# Patient Record
Sex: FEMALE | Race: WHITE | NOT HISPANIC OR LATINO | Employment: UNEMPLOYED | URBAN - METROPOLITAN AREA
[De-identification: names, ages, dates, MRNs, and addresses within clinical notes are randomized per-mention and may not be internally consistent; named-entity substitution may affect disease eponyms.]

---

## 2020-06-22 ENCOUNTER — TELEPHONE (OUTPATIENT)
Dept: OBSTETRICS AND GYNECOLOGY | Facility: CLINIC | Age: 25
End: 2020-06-22

## 2020-06-22 NOTE — TELEPHONE ENCOUNTER
----- Message from Danyelle Cunningham sent at 6/22/2020  9:13 AM CDT -----  Contact: Self  Joseph Pepper  MRN: [id number]  Home Phone      [phone]  Work Phone      Not on file.  Mobile          [phone]    No care team member to display  OB? Yes, Unknown  What phone number can you be reached at? [phone]  Message:   Pt requesting new patient annual, dx preg

## 2020-07-28 ENCOUNTER — OFFICE VISIT (OUTPATIENT)
Dept: OBSTETRICS AND GYNECOLOGY | Facility: CLINIC | Age: 25
End: 2020-07-28
Payer: COMMERCIAL

## 2020-07-28 ENCOUNTER — LAB VISIT (OUTPATIENT)
Dept: LAB | Facility: HOSPITAL | Age: 25
End: 2020-07-28
Attending: OBSTETRICS & GYNECOLOGY
Payer: COMMERCIAL

## 2020-07-28 VITALS
RESPIRATION RATE: 16 BRPM | BODY MASS INDEX: 42.65 KG/M2 | DIASTOLIC BLOOD PRESSURE: 84 MMHG | SYSTOLIC BLOOD PRESSURE: 126 MMHG | HEART RATE: 120 BPM | HEIGHT: 66 IN | WEIGHT: 265.38 LBS

## 2020-07-28 DIAGNOSIS — O21.9 NAUSEA AND VOMITING DURING PREGNANCY: ICD-10-CM

## 2020-07-28 DIAGNOSIS — Z32.01 PREGNANCY EXAMINATION OR TEST, POSITIVE RESULT: Primary | ICD-10-CM

## 2020-07-28 DIAGNOSIS — Z32.01 PREGNANCY EXAMINATION OR TEST, POSITIVE RESULT: ICD-10-CM

## 2020-07-28 DIAGNOSIS — Z36.89 ENCOUNTER TO ESTABLISH GESTATIONAL AGE USING ULTRASOUND: ICD-10-CM

## 2020-07-28 DIAGNOSIS — Z12.4 CERVICAL CANCER SCREENING: ICD-10-CM

## 2020-07-28 DIAGNOSIS — Z36.9 ANTENATAL SCREENING ENCOUNTER: ICD-10-CM

## 2020-07-28 LAB
ABO + RH BLD: NORMAL
B-HCG UR QL: POSITIVE
BASOPHILS # BLD AUTO: 0.03 K/UL (ref 0–0.2)
BASOPHILS NFR BLD: 0.2 % (ref 0–1.9)
BLD GP AB SCN CELLS X3 SERPL QL: NORMAL
CTP QC/QA: YES
DIFFERENTIAL METHOD: ABNORMAL
EOSINOPHIL # BLD AUTO: 0.2 K/UL (ref 0–0.5)
EOSINOPHIL NFR BLD: 1.8 % (ref 0–8)
ERYTHROCYTE [DISTWIDTH] IN BLOOD BY AUTOMATED COUNT: 13.8 % (ref 11.5–14.5)
HCT VFR BLD AUTO: 40.5 % (ref 37–48.5)
HGB BLD-MCNC: 13.2 G/DL (ref 12–16)
IMM GRANULOCYTES # BLD AUTO: 0.02 K/UL (ref 0–0.04)
IMM GRANULOCYTES NFR BLD AUTO: 0.2 % (ref 0–0.5)
LYMPHOCYTES # BLD AUTO: 2.3 K/UL (ref 1–4.8)
LYMPHOCYTES NFR BLD: 18.9 % (ref 18–48)
MCH RBC QN AUTO: 27.3 PG (ref 27–31)
MCHC RBC AUTO-ENTMCNC: 32.6 G/DL (ref 32–36)
MCV RBC AUTO: 84 FL (ref 82–98)
MONOCYTES # BLD AUTO: 0.8 K/UL (ref 0.3–1)
MONOCYTES NFR BLD: 6.6 % (ref 4–15)
NEUTROPHILS # BLD AUTO: 8.7 K/UL (ref 1.8–7.7)
NEUTROPHILS NFR BLD: 72.3 % (ref 38–73)
NRBC BLD-RTO: 0 /100 WBC
PLATELET # BLD AUTO: 343 K/UL (ref 150–350)
PMV BLD AUTO: 10.2 FL (ref 9.2–12.9)
RBC # BLD AUTO: 4.84 M/UL (ref 4–5.4)
WBC # BLD AUTO: 12.05 K/UL (ref 3.9–12.7)

## 2020-07-28 PROCEDURE — 87086 URINE CULTURE/COLONY COUNT: CPT

## 2020-07-28 PROCEDURE — 88175 CYTOPATH C/V AUTO FLUID REDO: CPT

## 2020-07-28 PROCEDURE — 86850 RBC ANTIBODY SCREEN: CPT

## 2020-07-28 PROCEDURE — 81220 CFTR GENE COM VARIANTS: CPT

## 2020-07-28 PROCEDURE — 99204 OFFICE O/P NEW MOD 45 MIN: CPT | Mod: 25,S$GLB,, | Performed by: OBSTETRICS & GYNECOLOGY

## 2020-07-28 PROCEDURE — 99999 PR PBB SHADOW E&M-EST. PATIENT-LVL III: CPT | Mod: PBBFAC,,, | Performed by: OBSTETRICS & GYNECOLOGY

## 2020-07-28 PROCEDURE — 81025 PR  URINE PREGNANCY TEST: ICD-10-PCS | Mod: S$GLB,,, | Performed by: OBSTETRICS & GYNECOLOGY

## 2020-07-28 PROCEDURE — 87491 CHLMYD TRACH DNA AMP PROBE: CPT

## 2020-07-28 PROCEDURE — 85025 COMPLETE CBC W/AUTO DIFF WBC: CPT

## 2020-07-28 PROCEDURE — 99999 PR PBB SHADOW E&M-EST. PATIENT-LVL III: ICD-10-PCS | Mod: PBBFAC,,, | Performed by: OBSTETRICS & GYNECOLOGY

## 2020-07-28 PROCEDURE — 3008F PR BODY MASS INDEX (BMI) DOCUMENTED: ICD-10-PCS | Mod: CPTII,S$GLB,, | Performed by: OBSTETRICS & GYNECOLOGY

## 2020-07-28 PROCEDURE — 87340 HEPATITIS B SURFACE AG IA: CPT

## 2020-07-28 PROCEDURE — 81025 URINE PREGNANCY TEST: CPT | Mod: S$GLB,,, | Performed by: OBSTETRICS & GYNECOLOGY

## 2020-07-28 PROCEDURE — 3008F BODY MASS INDEX DOCD: CPT | Mod: CPTII,S$GLB,, | Performed by: OBSTETRICS & GYNECOLOGY

## 2020-07-28 PROCEDURE — 36415 COLL VENOUS BLD VENIPUNCTURE: CPT

## 2020-07-28 PROCEDURE — 99204 PR OFFICE/OUTPT VISIT, NEW, LEVL IV, 45-59 MIN: ICD-10-PCS | Mod: 25,S$GLB,, | Performed by: OBSTETRICS & GYNECOLOGY

## 2020-07-28 PROCEDURE — 86762 RUBELLA ANTIBODY: CPT

## 2020-07-28 PROCEDURE — 86592 SYPHILIS TEST NON-TREP QUAL: CPT

## 2020-07-28 PROCEDURE — 86703 HIV-1/HIV-2 1 RESULT ANTBDY: CPT

## 2020-07-28 RX ORDER — PROMETHAZINE HYDROCHLORIDE 25 MG/1
25 TABLET ORAL EVERY 6 HOURS PRN
Qty: 30 TABLET | Refills: 1 | Status: SHIPPED | OUTPATIENT
Start: 2020-07-28 | End: 2020-08-27

## 2020-07-28 RX ORDER — DOCUSATE SODIUM 100 MG/1
100 CAPSULE, LIQUID FILLED ORAL 2 TIMES DAILY
COMMUNITY
End: 2022-10-04

## 2020-07-28 RX ORDER — ONDANSETRON 4 MG/1
4 TABLET, ORALLY DISINTEGRATING ORAL EVERY 6 HOURS PRN
Qty: 30 TABLET | Refills: 3 | Status: SHIPPED | OUTPATIENT
Start: 2020-07-28 | End: 2020-08-27

## 2020-07-28 RX ORDER — ACETAMINOPHEN 325 MG/1
325 TABLET ORAL EVERY 6 HOURS PRN
Status: ON HOLD | COMMUNITY
End: 2021-02-26 | Stop reason: HOSPADM

## 2020-07-28 NOTE — PROGRESS NOTES
Subjective:   Patient ID: Joseph Malin is a 24 y.o. y.o. female.     Chief Complaint: Missed Menses       History of Present Illness:    Joseph presents today complaining of amenorrhea. Patient's last menstrual period was 05/22/2020.. Prior menstrual cycles have been regular. She reports breast tenderness, fatigue, frequent urination, nausea and positive home pregnancy test. UPT is positive.       History reviewed. No pertinent past medical history.  Past Surgical History:   Procedure Laterality Date    COLONOSCOPY      ESOPHAGOSCOPY      TONSILLECTOMY, ADENOIDECTOMY, BILATERAL MYRINGOTOMY AND TUBES      WISDOM TOOTH EXTRACTION       Social History     Socioeconomic History    Marital status:      Spouse name: Not on file    Number of children: Not on file    Years of education: Not on file    Highest education level: Not on file   Occupational History    Not on file   Social Needs    Financial resource strain: Not on file    Food insecurity     Worry: Not on file     Inability: Not on file    Transportation needs     Medical: Not on file     Non-medical: Not on file   Tobacco Use    Smoking status: Never Smoker    Smokeless tobacco: Never Used   Substance and Sexual Activity    Alcohol use: Not Currently     Comment: occ    Drug use: Never    Sexual activity: Yes     Partners: Male     Birth control/protection: None     Comment:    Lifestyle    Physical activity     Days per week: Not on file     Minutes per session: Not on file    Stress: Not on file   Relationships    Social connections     Talks on phone: Not on file     Gets together: Not on file     Attends [employer] service: Not on file     Active member of club or organization: Not on file     Attends meetings of clubs or organizations: Not on file     Relationship status: Not on file   Other Topics Concern    Not on file   Social History Narrative    Not on file     Family History   Problem Relation Age of  Onset    Breast cancer Neg Hx     Colon cancer Neg Hx     Ovarian cancer Neg Hx      OB History    Para Term  AB Living   0 0 0 0 0 0   SAB TAB Ectopic Multiple Live Births   0 0 0 0 0         ROS:   Review of Systems   Constitutional: Positive for fatigue. Negative for chills, diaphoresis, fever and unexpected weight change.   HENT: Negative for congestion, hearing loss, rhinorrhea and sore throat.    Eyes: Negative for pain, discharge and visual disturbance.   Respiratory: Negative for apnea, cough, shortness of breath and wheezing.    Cardiovascular: Negative for chest pain, palpitations and leg swelling.   Gastrointestinal: Positive for constipation, diarrhea, nausea and vomiting. Negative for abdominal pain.   Endocrine: Negative for cold intolerance and heat intolerance.   Genitourinary: Negative for difficulty urinating, dyspareunia, dysuria, flank pain, frequency, genital sores, hematuria, menstrual problem, pelvic pain, vaginal bleeding, vaginal discharge and vaginal pain.   Musculoskeletal: Negative for arthralgias, back pain and joint swelling.   Skin: Negative for rash.   Neurological: Negative for dizziness, weakness, light-headedness, numbness and headaches.   Psychiatric/Behavioral: Negative for agitation and confusion. The patient is not nervous/anxious.            Objective:   Vital Signs:  Vitals:    20 1535   BP: 126/84   Pulse: (Abnormal) 120   Resp: 16       Physical Exam:  General:  alert, cooperative, no distress   Head: Normocephalic, atraumatic   Neck: Supple, Normal ROM   Respiratory: Normal effort   Neuro/Psych: Alert and oriented, appropriate mood and affect   Abdomen:  soft, NT   Pelvis: External genitalia: normal general appearance  Urinary system: urethral meatus normal, bladder nontender  Vaginal: normal mucosa without prolapse or lesions  Cervix: normal appearance  Uterus: normal size, shape, position  Adnexa: normal size, nontender bilaterally   Ext No edema,  NT       Assessment:      1. Pregnancy examination or test, positive result    2.  screening encounter    3. Encounter to establish gestational age using ultrasound    4. Cervical cancer screening    5. Nausea and vomiting during pregnancy          Plan:        Pregnancy examination or test, positive result  -     POCT urine pregnancy  -     Urine culture  -     C. trachomatis/N. gonorrhoeae by AMP DNA [facility]; Cervix  -     prenatal,calc.40-iron-folate 1 27-1 mg Tab; Take 1 tablet by mouth once daily.  Dispense: 30 each; Refill: 3  -     Type & Screen - Ob Profile; Future; Expected date: 2020  -     CBC auto differential; Future; Expected date: 2020  -     HIV 1/2 Ag/Ab (4th Gen); Future; Expected date: 2020  -     Hepatitis B Surface Antigen; Future; Expected date: 2020  -     Rubella Antibody, IgG; Future; Expected date: 2020  -     RPR; Future; Expected date: 2020  -     Cystic Fibrosis Mutation Panel; Future; Expected date: 2020  -     US OB/GYN Procedure ([facility]) - Extended List; Future     screening encounter  -     POCT urine pregnancy  -     Urine culture  -     C. trachomatis/N. gonorrhoeae by AMP DNA [facility]; Cervix  -     prenatal,calc.40-iron-folate 1 27-1 mg Tab; Take 1 tablet by mouth once daily.  Dispense: 30 each; Refill: 3  -     Type & Screen - Ob Profile; Future; Expected date: 2020  -     CBC auto differential; Future; Expected date: 2020  -     HIV 1/2 Ag/Ab (4th Gen); Future; Expected date: 2020  -     Hepatitis B Surface Antigen; Future; Expected date: 2020  -     Rubella Antibody, IgG; Future; Expected date: 2020  -     RPR; Future; Expected date: 2020  -     Cystic Fibrosis Mutation Panel; Future; Expected date: 2020    Encounter to establish gestational age using ultrasound  -     US OB/GYN Procedure ([facility]) - Extended List; Future    Cervical cancer screening  -     Liquid-Based  Pap Smear, Screening    Nausea and vomiting during pregnancy  -     ondansetron (ZOFRAN-ODT) 4 MG TbDL; Take 1 tablet (4 mg total) by mouth every 6 (six) hours as needed (Nausea).  Dispense: 30 tablet; Refill: 3  -     promethazine (PHENERGAN) 25 MG tablet; Take 1 tablet (25 mg total) by mouth every 6 (six) hours as needed for Nausea.  Dispense: 30 tablet; Refill: 1        1. Pap today  2. Prenatal labs ordered and GC/CT performed.  3. Schedule Ultrasound  4. Follow up in 4 weeks after ultrasound.    Patient was counseled today on proper weight gain based on the [employer]'s recommendations based on her pre-pregnancy weight. Discussed foods to avoid in pregnancy (i.e. sushi, fish that are high in mercury, deli meat, and unpasteurized cheeses). Discussed prenatal vitamin options (i.e. stool softener, DHA). She was also counseled on safe, healthy behavior as well as medications safe in pregnancy.     Initial ob packet supplied to patient. Contents supplied and discussed include medications safe in pregnancy, pregnancy A to Z, and handout on skin to skin and breastfeeding. Coeffective mandy card supplied and discussed.

## 2020-07-29 ENCOUNTER — PROCEDURE VISIT (OUTPATIENT)
Dept: OBSTETRICS AND GYNECOLOGY | Facility: CLINIC | Age: 25
End: 2020-07-29
Payer: COMMERCIAL

## 2020-07-29 DIAGNOSIS — Z32.01 PREGNANCY EXAMINATION OR TEST, POSITIVE RESULT: ICD-10-CM

## 2020-07-29 DIAGNOSIS — Z36.89 ENCOUNTER TO ESTABLISH GESTATIONAL AGE USING ULTRASOUND: ICD-10-CM

## 2020-07-29 LAB
HBV SURFACE AG SERPL QL IA: NEGATIVE
HIV 1+2 AB+HIV1 P24 AG SERPL QL IA: NEGATIVE
RPR SER QL: NORMAL
RUBV IGG SER-ACNC: 14.6 IU/ML
RUBV IGG SER-IMP: REACTIVE

## 2020-07-29 PROCEDURE — 76817 TRANSVAGINAL US OBSTETRIC: CPT | Mod: S$GLB,,, | Performed by: OBSTETRICS & GYNECOLOGY

## 2020-07-29 PROCEDURE — 76817 PR US, OB, TRANSVAG APPROACH: ICD-10-PCS | Mod: S$GLB,,, | Performed by: OBSTETRICS & GYNECOLOGY

## 2020-07-30 LAB
BACTERIA UR CULT: NORMAL
BACTERIA UR CULT: NORMAL

## 2020-08-01 LAB
C TRACH DNA SPEC QL NAA+PROBE: NOT DETECTED
N GONORRHOEA DNA SPEC QL NAA+PROBE: NOT DETECTED

## 2020-08-03 LAB — CFTR MUT ANL BLD/T: NORMAL

## 2020-08-13 LAB
FINAL PATHOLOGIC DIAGNOSIS: NORMAL
Lab: NORMAL

## 2020-09-02 ENCOUNTER — INITIAL PRENATAL (OUTPATIENT)
Dept: OBSTETRICS AND GYNECOLOGY | Facility: CLINIC | Age: 25
End: 2020-09-02
Payer: COMMERCIAL

## 2020-09-02 VITALS
WEIGHT: 268.38 LBS | HEART RATE: 94 BPM | DIASTOLIC BLOOD PRESSURE: 82 MMHG | BODY MASS INDEX: 43.32 KG/M2 | SYSTOLIC BLOOD PRESSURE: 124 MMHG

## 2020-09-02 DIAGNOSIS — Z3A.14 14 WEEKS GESTATION OF PREGNANCY: Primary | ICD-10-CM

## 2020-09-02 PROCEDURE — 99999 PR PBB SHADOW E&M-EST. PATIENT-LVL III: ICD-10-PCS | Mod: PBBFAC,,, | Performed by: OBSTETRICS & GYNECOLOGY

## 2020-09-02 PROCEDURE — 0500F PR INITIAL PRENATAL CARE VISIT: ICD-10-PCS | Mod: S$GLB,,, | Performed by: OBSTETRICS & GYNECOLOGY

## 2020-09-02 PROCEDURE — 0500F INITIAL PRENATAL CARE VISIT: CPT | Mod: S$GLB,,, | Performed by: OBSTETRICS & GYNECOLOGY

## 2020-09-02 PROCEDURE — 99999 PR PBB SHADOW E&M-EST. PATIENT-LVL III: CPT | Mod: PBBFAC,,, | Performed by: OBSTETRICS & GYNECOLOGY

## 2020-09-02 RX ORDER — PROMETHAZINE HYDROCHLORIDE 12.5 MG/1
12.5 TABLET ORAL 4 TIMES DAILY
COMMUNITY
End: 2020-10-12 | Stop reason: SDUPTHER

## 2020-09-02 NOTE — PROGRESS NOTES
Patient doing well. No vaginal bleeding or cramping noted. Discussed the need for an anatomy scan between 18-20 weeks. Discussed genetic screening, declines. RTC in 4 weeks.     Coffective counseling sheet Get Ready discussed with mother. Reinforced avoiding induction of labor unless medically indicated as well as comfort measures during labor.  Encouraged mother to download Coffective mobile mandy if she has not already done so. Mother verbalizes understanding.

## 2020-09-03 NOTE — TELEPHONE ENCOUNTER
----- Message from Jocy Arnett MA sent at 9/3/2020  9:12 AM CDT -----  Contact: self  Joseph Malin  MRN: [id number]  Home Phone      [phone]  Work Phone      Not on file.  Mobile          [phone]    Patient Care Team:  Jesusita Avery NP as PCP - General (Family Medicine)  OB? Yes, 14w2d  What phone number can you be reached at?  [phone]  Message:  Would like to see about getting new Rx for a different type of prenatal medication.  Stated the one she is currently on, her pharmacy no longer carries.    Pharmacy:  [facility]

## 2020-09-14 NOTE — TELEPHONE ENCOUNTER
----- Message from Jocy Arnett MA sent at 9/14/2020 10:38 AM CDT -----  Contact: SELF  Joseph Malin  MRN: [id number]  Home Phone      [phone]  Work Phone      Not on file.  Mobile          [phone]    Patient Care Team:  Jesusita Avery NP as PCP - General (Family Medicine)  OB? Yes, 15w6d  What phone number can you be reached at? [phone]  Message:  Needs to speak to nurse regarding prenatal vitamins and getting something called in for nausea.

## 2020-09-14 NOTE — TELEPHONE ENCOUNTER
Ob patient calling with c/o nausea. Has tried phenergan, but throws it up after taking the medication. Patient instructed to check with pharmacy to see what prenatal they have available and check with insurance for coverage. Patient voiced understanding.

## 2020-09-15 RX ORDER — ONDANSETRON 4 MG/1
4 TABLET, ORALLY DISINTEGRATING ORAL EVERY 6 HOURS PRN
Qty: 30 TABLET | Refills: 0 | Status: SHIPPED | OUTPATIENT
Start: 2020-09-15 | End: 2021-02-02

## 2020-09-18 NOTE — TELEPHONE ENCOUNTER
Joseph desire refill of PNV  There is no problem list on file for this patient.    Prior to Admission medications    Medication Sig Start Date End Date Taking? Authorizing Provider   acetaminophen (TYLENOL) 325 MG tablet Take 325 mg by mouth every 6 (six) hours as needed for Pain.    Historical Provider   docusate sodium (COLACE) 100 MG capsule Take 100 mg by mouth 2 (two) times daily.    Historical Provider   multivit 47-iron-folate 1-dha (PNV-DHA) 27 mg iron-1 mg -300 mg Cap Take 1 capsule by mouth once daily. 9/3/20 9/3/21  Gonzalo Trammell MD   ondansetron (ZOFRAN-ODT) 4 MG TbDL Take 1 tablet (4 mg total) by mouth every 6 (six) hours as needed. 9/15/20   Mehran Fish MD   pediatric multivitamin no.49 (FLINTSTONES GUMMIES ORAL) Take by mouth.    Historical Provider   prenatal,calc.40-iron-folate 1 27-1 mg Tab Take 1 tablet by mouth once daily.  Patient not taking: Reported on 9/2/2020 7/28/20 7/28/21  Mehran Fish MD   promethazine (PHENERGAN) 12.5 MG Tab Take 12.5 mg by mouth 4 (four) times daily.    Historical Provider

## 2020-09-18 NOTE — TELEPHONE ENCOUNTER
----- Message from Jocy Arnett MA sent at 9/18/2020 10:05 AM CDT -----  Contact: self  Joseph Malin  MRN: [id number]  Home Phone      [phone]  Work Phone      Not on file.  Mobile          [phone]    Patient Care Team:  Jesusita Avery NP as PCP - General (Family Medicine)  OB? Yes, 16w3d  What phone number can you be reached at? [phone]  Message:   Needs refill on prental vitamin  Pharmacy:  [facility]

## 2020-09-30 ENCOUNTER — ROUTINE PRENATAL (OUTPATIENT)
Dept: OBSTETRICS AND GYNECOLOGY | Facility: CLINIC | Age: 25
End: 2020-09-30
Payer: COMMERCIAL

## 2020-09-30 ENCOUNTER — TELEPHONE (OUTPATIENT)
Dept: OBSTETRICS AND GYNECOLOGY | Facility: CLINIC | Age: 25
End: 2020-09-30

## 2020-09-30 VITALS
HEART RATE: 90 BPM | WEIGHT: 272.19 LBS | SYSTOLIC BLOOD PRESSURE: 116 MMHG | BODY MASS INDEX: 43.93 KG/M2 | DIASTOLIC BLOOD PRESSURE: 74 MMHG

## 2020-09-30 DIAGNOSIS — Z3A.18 18 WEEKS GESTATION OF PREGNANCY: Primary | ICD-10-CM

## 2020-09-30 DIAGNOSIS — Z36.9 ANTENATAL SCREENING ENCOUNTER: ICD-10-CM

## 2020-09-30 DIAGNOSIS — Z36.89 ENCOUNTER FOR FETAL ANATOMIC SURVEY: ICD-10-CM

## 2020-09-30 DIAGNOSIS — Z32.01 PREGNANCY EXAMINATION OR TEST, POSITIVE RESULT: ICD-10-CM

## 2020-09-30 DIAGNOSIS — Z82.49 FAMILY HISTORY OF CARDIAC DISORDER IN FATHER: ICD-10-CM

## 2020-09-30 PROCEDURE — 99999 PR PBB SHADOW E&M-EST. PATIENT-LVL III: CPT | Mod: PBBFAC,,, | Performed by: OBSTETRICS & GYNECOLOGY

## 2020-09-30 PROCEDURE — 99999 PR PBB SHADOW E&M-EST. PATIENT-LVL III: ICD-10-PCS | Mod: PBBFAC,,, | Performed by: OBSTETRICS & GYNECOLOGY

## 2020-09-30 PROCEDURE — 0502F SUBSEQUENT PRENATAL CARE: CPT | Mod: CPTII,S$GLB,, | Performed by: OBSTETRICS & GYNECOLOGY

## 2020-09-30 PROCEDURE — 0502F PR SUBSEQUENT PRENATAL CARE: ICD-10-PCS | Mod: CPTII,S$GLB,, | Performed by: OBSTETRICS & GYNECOLOGY

## 2020-09-30 NOTE — TELEPHONE ENCOUNTER
MFM referral placed. Staff message sent to [facility] to schedule patient appointment. Patient aware.     Patient seen today. PNV refill request addressed at OV.

## 2020-09-30 NOTE — PROGRESS NOTES
Patient with no complaints. Denies vaginal bleeding or cramping.  Patient declines quad screen. Anatomy scan ordered, to be done at [facility] secondary to FOB with congenital anomaly. RTC in 4 weeks    Coffective counseling sheet Fall In Love discussed with mother. Reinforced immediate skin to skin, the magic first hour, importance of the first feeding and delaying routine procedures. Encouraged mother to download Coffective mobile mandy if she has not already done so. Mother verbalizes understanding.

## 2020-10-01 ENCOUNTER — TELEPHONE (OUTPATIENT)
Dept: OBSTETRICS AND GYNECOLOGY | Facility: CLINIC | Age: 25
End: 2020-10-01

## 2020-10-01 NOTE — TELEPHONE ENCOUNTER
----- Message from Ruthie Rudolph sent at 10/1/2020  3:32 PM CDT -----  Regarding: Self  Joseph Malin  MRN: [id number]  : 1995  PCP: Jesusita Avery  Home Phone      [phone]  Work Phone      Not on file.  Mobile          [phone]      MESSAGE:   Pt has questions about her upcoming appt with [facility],  Please return call @ [phone]. Thanks.

## 2020-10-12 RX ORDER — PROMETHAZINE HYDROCHLORIDE 12.5 MG/1
12.5 TABLET ORAL EVERY 6 HOURS PRN
Qty: 30 TABLET | Refills: 0 | Status: SHIPPED | OUTPATIENT
Start: 2020-10-12 | End: 2020-12-22

## 2020-10-12 NOTE — TELEPHONE ENCOUNTER
----- Message from Danyelle Cunningham sent at 10/12/2020  9:03 AM CDT -----  Contact: Self  Joseph Malin  MRN: [id number]  Home Phone      [phone]  Work Phone      Not on file.  Mobile          [phone]    Patient Care Team:  Jesusita Avery NP as PCP - General (Family Medicine)  OB? Yes, 19w6d  What phone number can you be reached at? [phone]  Message:   Pt would like to speak to nurse regarding brown discharge yesterday morning and again this morning. Please advise.

## 2020-10-12 NOTE — TELEPHONE ENCOUNTER
Joseph desire refill of phenergan.   There is no problem list on file for this patient.    Prior to Admission medications    Medication Sig Start Date End Date Taking? Authorizing Provider   acetaminophen (TYLENOL) 325 MG tablet Take 325 mg by mouth every 6 (six) hours as needed for Pain.    Historical Provider   docusate sodium (COLACE) 100 MG capsule Take 100 mg by mouth 2 (two) times daily.    Historical Provider   multivit 47-iron-folate 1-dha (PNV-DHA) 27 mg iron-1 mg -300 mg Cap Take 1 capsule by mouth once daily.  Patient not taking: Reported on 9/30/2020 9/18/20 9/18/21  Mehran Fish MD   ondansetron (ZOFRAN-ODT) 4 MG TbDL Take 1 tablet (4 mg total) by mouth every 6 (six) hours as needed.  Patient not taking: Reported on 9/30/2020 9/15/20   Mehran Fish MD   pediatric multivitamin no.49 (FLINTSTONES GUMMIES ORAL) Take by mouth.    Historical Provider   prenatal,calc.40-iron-folate 1 27-1 mg Tab Take 1 tablet by mouth once daily. 9/30/20 9/30/21  Mehran Fish MD   promethazine (PHENERGAN) 12.5 MG Tab Take 12.5 mg by mouth 4 (four) times daily.    Historical Provider

## 2020-10-12 NOTE — TELEPHONE ENCOUNTER
Patient did have intercourse 10/10/20. States spotting is just brown discharge, never had any bright red bleeding.Instructed patient this can be normal after intercourse. Denies cramping. Precautions given.

## 2020-10-16 ENCOUNTER — INITIAL CONSULT (OUTPATIENT)
Dept: MATERNAL FETAL MEDICINE | Facility: CLINIC | Age: 25
End: 2020-10-16
Payer: COMMERCIAL

## 2020-10-16 ENCOUNTER — PROCEDURE VISIT (OUTPATIENT)
Dept: MATERNAL FETAL MEDICINE | Facility: CLINIC | Age: 25
End: 2020-10-16
Payer: COMMERCIAL

## 2020-10-16 VITALS
SYSTOLIC BLOOD PRESSURE: 124 MMHG | BODY MASS INDEX: 44.04 KG/M2 | HEIGHT: 66 IN | WEIGHT: 274.06 LBS | DIASTOLIC BLOOD PRESSURE: 88 MMHG

## 2020-10-16 DIAGNOSIS — Z82.49 FAMILY HISTORY OF CARDIAC DISORDER IN FATHER: ICD-10-CM

## 2020-10-16 DIAGNOSIS — Z82.49 FAMILY HISTORY OF CARDIAC DISORDER IN FATHER: Primary | ICD-10-CM

## 2020-10-16 DIAGNOSIS — Z36.89 ENCOUNTER FOR FETAL ANATOMIC SURVEY: ICD-10-CM

## 2020-10-16 DIAGNOSIS — O99.212 OBESITY AFFECTING PREGNANCY IN SECOND TRIMESTER: ICD-10-CM

## 2020-10-16 PROCEDURE — 76811 PR US, OB FETAL EVAL & EXAM, TRANSABDOM,FIRST GESTATION: ICD-10-PCS | Mod: S$GLB,,, | Performed by: OBSTETRICS & GYNECOLOGY

## 2020-10-16 PROCEDURE — 3008F PR BODY MASS INDEX (BMI) DOCUMENTED: ICD-10-PCS | Mod: CPTII,S$GLB,, | Performed by: OBSTETRICS & GYNECOLOGY

## 2020-10-16 PROCEDURE — 99999 PR PBB SHADOW E&M-EST. PATIENT-LVL III: CPT | Mod: PBBFAC,,, | Performed by: OBSTETRICS & GYNECOLOGY

## 2020-10-16 PROCEDURE — 99214 OFFICE O/P EST MOD 30 MIN: CPT | Mod: 25,S$GLB,, | Performed by: OBSTETRICS & GYNECOLOGY

## 2020-10-16 PROCEDURE — 76811 OB US DETAILED SNGL FETUS: CPT | Mod: S$GLB,,, | Performed by: OBSTETRICS & GYNECOLOGY

## 2020-10-16 PROCEDURE — 99214 PR OFFICE/OUTPT VISIT, EST, LEVL IV, 30-39 MIN: ICD-10-PCS | Mod: 25,S$GLB,, | Performed by: OBSTETRICS & GYNECOLOGY

## 2020-10-16 PROCEDURE — 99999 PR PBB SHADOW E&M-EST. PATIENT-LVL III: ICD-10-PCS | Mod: PBBFAC,,, | Performed by: OBSTETRICS & GYNECOLOGY

## 2020-10-16 PROCEDURE — 3008F BODY MASS INDEX DOCD: CPT | Mod: CPTII,S$GLB,, | Performed by: OBSTETRICS & GYNECOLOGY

## 2020-10-16 NOTE — LETTER
October 16, 2020      Mehran Fish MD  [address] [place] [address]           [facility]  [address]  [place] [address]  Phone: [phone]          Patient: Joseph Malin   MR Number: [id number]   YOB: 1995   Date of Visit: 10/16/2020       Dear Dr. Mehran Fish:    Thank you for referring Joseph Malin to me for evaluation. Attached you will find relevant portions of my assessment and plan of care.    If you have questions, please do not hesitate to call me. I look forward to following Joseph Malin along with you.    Sincerely,    Isidra Hemphill MD    Enclosure  CC:  No Recipients    If you would like to receive this communication electronically, please contact [email or web address] or [phone] to request more information on [employer] access.    For providers and/or their staff who would like to refer a patient to [facility], please contact us through our one-stop-shop provider referral line, [facility], at [phone].    If you feel you have received this communication in error or would no longer like to receive these types of communications, please e-mail [email or web address]

## 2020-10-26 ENCOUNTER — CLINICAL SUPPORT (OUTPATIENT)
Dept: PEDIATRIC CARDIOLOGY | Facility: CLINIC | Age: 25
End: 2020-10-26
Payer: COMMERCIAL

## 2020-10-26 ENCOUNTER — OFFICE VISIT (OUTPATIENT)
Dept: PEDIATRIC CARDIOLOGY | Facility: CLINIC | Age: 25
End: 2020-10-26
Attending: PEDIATRICS
Payer: COMMERCIAL

## 2020-10-26 VITALS
BODY MASS INDEX: 44.47 KG/M2 | SYSTOLIC BLOOD PRESSURE: 124 MMHG | WEIGHT: 276.69 LBS | HEIGHT: 66 IN | DIASTOLIC BLOOD PRESSURE: 82 MMHG

## 2020-10-26 DIAGNOSIS — Z82.79 FAMILY HISTORY OF CONGENITAL HEART DISEASE IN FATHER: ICD-10-CM

## 2020-10-26 DIAGNOSIS — Z82.49 FAMILY HISTORY OF CARDIAC DISORDER IN FATHER: ICD-10-CM

## 2020-10-26 DIAGNOSIS — Z3A.21 21 WEEKS GESTATION OF PREGNANCY: ICD-10-CM

## 2020-10-26 PROCEDURE — 76827 ECHO EXAM OF FETAL HEART: CPT | Mod: S$GLB,,, | Performed by: PEDIATRICS

## 2020-10-26 PROCEDURE — 99999 PR PBB SHADOW E&M-EST. PATIENT-LVL III: CPT | Mod: PBBFAC,,, | Performed by: PEDIATRICS

## 2020-10-26 PROCEDURE — 76825 PR  SO2 FETAL HEART: ICD-10-PCS | Mod: S$GLB,,, | Performed by: PEDIATRICS

## 2020-10-26 PROCEDURE — 76827 PR  SO2 FETAL HEART DOPPLER: ICD-10-PCS | Mod: S$GLB,,, | Performed by: PEDIATRICS

## 2020-10-26 PROCEDURE — 93325 DOPPLER ECHO COLOR FLOW MAPG: CPT | Mod: S$GLB,,, | Performed by: PEDIATRICS

## 2020-10-26 PROCEDURE — 99999 PR PBB SHADOW E&M-EST. PATIENT-LVL III: ICD-10-PCS | Mod: PBBFAC,,, | Performed by: PEDIATRICS

## 2020-10-26 PROCEDURE — 76825 ECHO EXAM OF FETAL HEART: CPT | Mod: S$GLB,,, | Performed by: PEDIATRICS

## 2020-10-26 PROCEDURE — 3008F PR BODY MASS INDEX (BMI) DOCUMENTED: ICD-10-PCS | Mod: CPTII,S$GLB,, | Performed by: PEDIATRICS

## 2020-10-26 PROCEDURE — 99204 PR OFFICE/OUTPT VISIT, NEW, LEVL IV, 45-59 MIN: ICD-10-PCS | Mod: 25,S$GLB,, | Performed by: PEDIATRICS

## 2020-10-26 PROCEDURE — 3008F BODY MASS INDEX DOCD: CPT | Mod: CPTII,S$GLB,, | Performed by: PEDIATRICS

## 2020-10-26 PROCEDURE — 93325 PR DOPPLER COLOR FLOW VELOCITY MAP: ICD-10-PCS | Mod: S$GLB,,, | Performed by: PEDIATRICS

## 2020-10-26 PROCEDURE — 99204 OFFICE O/P NEW MOD 45 MIN: CPT | Mod: 25,S$GLB,, | Performed by: PEDIATRICS

## 2020-10-26 NOTE — PROGRESS NOTES
I had the pleasure of evaluating Joseph Malin who is now 25 y.o.  and carrying her 1st pregnancy at 21 6/7 weeks gestation. She was referred for evaluation of the fetal heart due to increased risks for congenital heart disease associated with paternal history of Ebstein's anomaly of the tricuspid aortic valve, atrial septal defect and Dorinda-Parkinson-White syndrome.      Current Outpatient Medications:     acetaminophen (TYLENOL) 325 MG tablet, Take 325 mg by mouth every 6 (six) hours as needed for Pain., Disp: , Rfl:     docusate sodium (COLACE) 100 MG capsule, Take 100 mg by mouth 2 (two) times daily., Disp: , Rfl:     pediatric multivitamin no.49 (FLINTSTONES GUMMIES ORAL), Take by mouth., Disp: , Rfl:     prenatal,calc.40-iron-folate 1 27-1 mg Tab, Take 1 tablet by mouth once daily., Disp: 90 each, Rfl: 3    promethazine (PHENERGAN) 12.5 MG Tab, Take 1 tablet (12.5 mg total) by mouth every 6 (six) hours as needed., Disp: 30 tablet, Rfl: 0    multivit 47-iron-folate 1-dha (PNV-DHA) 27 mg iron-1 mg -300 mg Cap, Take 1 capsule by mouth once daily. (Patient not taking: Reported on 9/30/2020), Disp: 30 capsule, Rfl: 6    ondansetron (ZOFRAN-ODT) 4 MG TbDL, Take 1 tablet (4 mg total) by mouth every 6 (six) hours as needed. (Patient not taking: Reported on 9/30/2020), Disp: 30 tablet, Rfl: 0  Review of patient's allergies indicates:  No Known Allergies    Maternal factors increasing risk for congenital heart disease:  None identified  Paternal factors increasing risk for congenital heart disease:  As indicated above.    FETAL ECHOCARDIOGRAM (preliminary):  Study technically limited by available acoustic windows-  Normal fetal cardiac connections and function for estimated gestational age in views available.  (Full report in electronic medical record)    I reviewed the strengths and weaknesses of fetal echocardiography including the insufficient sensitivity to rule out many septal defects, anomalies of  pulmonary and systemic veins, arch anomalies, and some valvar abnormalities. I also discussed that  resolution of the ductus arteriosus and foramen ovale (normal fetal structures) cannot be assured by fetal evaluation. Finally, I reviewed today's echocardiogram that demonstrates normal anatomical connections and function for the estimated gestational age. Within the limitations imposed by fetal physiology, I would expect a reasonable probability that this infant will be born with a normal heart.    Ms. Malin came in today with her partner.  I took care of him for many years before he became old enough to be followed in the [facility].  He has had a serious abnormality of the tricuspid valve with Ebstein's anomaly with significant displacement of apposition well into the right ventricle.  He also had an atrial septal defect that we chose to close with a percutaneous device secondary to right to left shunt that resulted improvement of exercise capacity.  In addition, he had Dorinda-Parkinson-White some associated with the Ebstein's anomaly with stratification demonstrating that he was not at significant risk for sudden death.  He also has never had significant symptoms of reentry tachycardia associated with this pathway.  As you can imagine, the father had significant questions regarding the risks for is infant and help best to approach follow-up.  These concerns required a relatively extensive discussion.    The cardiovascular abnormalities noted in the father can be transmitted to offspring and do increase the risk for this infant to have significant congenital heart disease both from a structural standpoint and electrophysiological perspective. This risk is difficult to estimate due to the relative low incidence of Ebstein's compared to other forms of congenital heart disease. The risk for inherited WPW is also difficult to estimate in this instance since it may occur  independently but also presents frequently in patients with Ebstein's compared to the general population.  The echocardiogram today did not demonstrate any obvious structural abnormality but was limited by difficult acoustic windows.  From a purely structural perspective, I do not think additional attempts at imaging the fetus are likely to produce a satisfactory result and believe that the best approach is to plan for a telemedicine study before discharge from the nursery.  Depending upon what that study demonstrates, I would like for the family to plan follow-up at the [facility] 2-4 weeks after delivery to obtain an EKG to rule out the possibility of an accessory pathway with antegrade conduction (WPW).  We will consider repeating the echocardiogram at that time depending upon the results of the  study.    I answered all the parent's questions. I also provided an information sheet reviewing the fetal physiology and how this compares to normal  physiology. This  also includes a reiteration of the limitations of fetal echocardiography that I have discussed today. I have not scheduled another fetal evaluation; however, if questions arise later during gestation or after delivery, I would be happy to assist in any way. Ms. Malin is comfortable with the plan.    RECOMMENDATIONS:  Elective evaluation of the infant terribly 24 hr after delivery to allow for fetal transition and before discharge from the nursery.  Sooner if there are any signs of hemodynamic compromise.        Note:  Over 50% of visit in consultation with the family >45 minutes

## 2020-10-26 NOTE — LETTER
October 26, 2020        Mehran Fish MD  [address] [place] [address]             [facility] [id number]  [address]  [facility] [address]  Phone: [phone]  Fax: [phone]   Patient: Joseph Malin   MR Number: [id number]   YOB: 1995   Date of Visit: 10/26/2020       Dear Dr. Fish:    Thank you for referring Joseph Malin to me for evaluation. Below are the relevant portions of my assessment and plan of care.            If you have questions, please do not hesitate to call me. I look forward to following Joseph along with you.    Sincerely,      Yoni Padron MD           CC  No Recipients

## 2020-10-27 ENCOUNTER — ROUTINE PRENATAL (OUTPATIENT)
Dept: OBSTETRICS AND GYNECOLOGY | Facility: CLINIC | Age: 25
End: 2020-10-27
Payer: COMMERCIAL

## 2020-10-27 ENCOUNTER — TELEPHONE (OUTPATIENT)
Dept: OBSTETRICS AND GYNECOLOGY | Facility: CLINIC | Age: 25
End: 2020-10-27

## 2020-10-27 VITALS
DIASTOLIC BLOOD PRESSURE: 73 MMHG | BODY MASS INDEX: 44.44 KG/M2 | HEART RATE: 114 BPM | SYSTOLIC BLOOD PRESSURE: 120 MMHG | WEIGHT: 275.19 LBS

## 2020-10-27 DIAGNOSIS — Z34.02 ENCOUNTER FOR SUPERVISION OF NORMAL FIRST PREGNANCY IN SECOND TRIMESTER: ICD-10-CM

## 2020-10-27 DIAGNOSIS — Z3A.22 22 WEEKS GESTATION OF PREGNANCY: Primary | ICD-10-CM

## 2020-10-27 PROCEDURE — 99999 PR PBB SHADOW E&M-EST. PATIENT-LVL III: ICD-10-PCS | Mod: PBBFAC,,, | Performed by: OBSTETRICS & GYNECOLOGY

## 2020-10-27 PROCEDURE — 0502F SUBSEQUENT PRENATAL CARE: CPT | Mod: CPTII,S$GLB,, | Performed by: OBSTETRICS & GYNECOLOGY

## 2020-10-27 PROCEDURE — 0502F PR SUBSEQUENT PRENATAL CARE: ICD-10-PCS | Mod: CPTII,S$GLB,, | Performed by: OBSTETRICS & GYNECOLOGY

## 2020-10-27 PROCEDURE — 99999 PR PBB SHADOW E&M-EST. PATIENT-LVL III: CPT | Mod: PBBFAC,,, | Performed by: OBSTETRICS & GYNECOLOGY

## 2020-10-27 NOTE — PROGRESS NOTES
Pediatric cardiology and MFM notes reviewed.  Appreciate all recommendations.     Patient with no complaints. Denies vaginal bleeding or cramping.  Good FM. Discussed with patient having glucose testing for gestational diabetes preformed between 24-28 weeks. RTC in 4 weeks.     Coffective counseling sheet Learn Your Baby and Protect Breastfeeding discussed with mother. Instructed regarding feeding cues and methods to calm baby. Encouraged mother to download Coffective mobile mandy if she has not already done so.  Mother verbalized understanding.

## 2020-10-27 NOTE — TELEPHONE ENCOUNTER
----- Message from Ruthie Rudolph sent at 10/27/2020 10:21 AM CDT -----  Regarding: Self  Joseph Malin  MRN: [id number]  Home Phone      [phone]  Work Phone      Not on file.  Mobile          [phone]    Patient Care Team:  Jesusita Avery NP as PCP - General (Family Medicine)  OB? Yes, 22w0d  What phone number can you be reached at? [phone]  Message:   Pt would like to r/s her appt due to bad weather, please return call.

## 2020-10-27 NOTE — TELEPHONE ENCOUNTER
----- Message from Ruthie Rudolph sent at 10/27/2020 10:21 AM CDT -----  Regarding: Self  Joseph Malin  MRN: [id number]  Home Phone      [phone]  Work Phone      Not on file.  Mobile          [phone]    Patient Care Team:  Jesusita Avery NP as PCP - General (Family Medicine)  OB? Yes, 22w0d  What phone number can you be reached at? [phone]  Message:   Pt would like to r/s her appt due to bad weather, please return call.

## 2020-11-02 ENCOUNTER — TELEPHONE (OUTPATIENT)
Dept: OBSTETRICS AND GYNECOLOGY | Facility: CLINIC | Age: 25
End: 2020-11-02

## 2020-11-02 NOTE — TELEPHONE ENCOUNTER
----- Message from Jocy Arnett MA sent at 11/2/2020  8:07 AM CST -----  Contact: self  Joseph Malin  MRN: [id number]  Home Phone      [phone]  Work Phone      Not on file.  Mobile          [phone]    Patient Care Team:  Jesusita Avery NP as PCP - General (Family Medicine)  OB? Yes, 22w6d  What phone number can you be reached at? [phone]  Message: Needs to speak to nurse regarding acid reflux.

## 2020-11-02 NOTE — TELEPHONE ENCOUNTER
Patient calling for rx for acid reflux. Patient has not tried OTC medications. Instructed patient recommended to take Pepcid, Zantac, or Gaviscon. Patient voiced understanding.

## 2020-11-17 ENCOUNTER — PROCEDURE VISIT (OUTPATIENT)
Dept: MATERNAL FETAL MEDICINE | Facility: CLINIC | Age: 25
End: 2020-11-17
Payer: COMMERCIAL

## 2020-11-17 DIAGNOSIS — Z36.89 ENCOUNTER FOR ULTRASOUND TO CHECK FETAL GROWTH: ICD-10-CM

## 2020-11-17 DIAGNOSIS — Z82.49 FAMILY HISTORY OF CARDIAC DISORDER IN FATHER: ICD-10-CM

## 2020-11-17 DIAGNOSIS — O99.210 MATERNAL OBESITY AFFECTING PREGNANCY, ANTEPARTUM: ICD-10-CM

## 2020-11-17 PROCEDURE — 76816 PR  US,PREGNANT UTERUS,F/U,TRANSABD APP: ICD-10-PCS | Mod: S$GLB,,, | Performed by: OBSTETRICS & GYNECOLOGY

## 2020-11-17 PROCEDURE — 76816 OB US FOLLOW-UP PER FETUS: CPT | Mod: S$GLB,,, | Performed by: OBSTETRICS & GYNECOLOGY

## 2020-11-26 ENCOUNTER — PATIENT MESSAGE (OUTPATIENT)
Dept: OBSTETRICS AND GYNECOLOGY | Facility: CLINIC | Age: 25
End: 2020-11-26

## 2020-11-27 ENCOUNTER — ROUTINE PRENATAL (OUTPATIENT)
Dept: OBSTETRICS AND GYNECOLOGY | Facility: CLINIC | Age: 25
End: 2020-11-27
Payer: COMMERCIAL

## 2020-11-27 ENCOUNTER — LAB VISIT (OUTPATIENT)
Dept: LAB | Facility: HOSPITAL | Age: 25
End: 2020-11-27
Attending: OBSTETRICS & GYNECOLOGY
Payer: COMMERCIAL

## 2020-11-27 VITALS
SYSTOLIC BLOOD PRESSURE: 132 MMHG | WEIGHT: 279.63 LBS | HEART RATE: 109 BPM | DIASTOLIC BLOOD PRESSURE: 88 MMHG | BODY MASS INDEX: 45.15 KG/M2

## 2020-11-27 DIAGNOSIS — Z34.02 ENCOUNTER FOR SUPERVISION OF NORMAL FIRST PREGNANCY IN SECOND TRIMESTER: ICD-10-CM

## 2020-11-27 DIAGNOSIS — O26.892 HEARTBURN DURING PREGNANCY, SECOND TRIMESTER: ICD-10-CM

## 2020-11-27 DIAGNOSIS — R73.09 GLUCOSE TOLERANCE TEST ABNORMAL: Primary | ICD-10-CM

## 2020-11-27 DIAGNOSIS — Z3A.26 26 WEEKS GESTATION OF PREGNANCY: Primary | ICD-10-CM

## 2020-11-27 DIAGNOSIS — R73.09 ABNORMAL GLUCOSE TOLERANCE TEST: ICD-10-CM

## 2020-11-27 DIAGNOSIS — R12 HEARTBURN DURING PREGNANCY, SECOND TRIMESTER: ICD-10-CM

## 2020-11-27 LAB
BASOPHILS # BLD AUTO: 0.03 K/UL (ref 0–0.2)
BASOPHILS NFR BLD: 0.3 % (ref 0–1.9)
DIFFERENTIAL METHOD: ABNORMAL
EOSINOPHIL # BLD AUTO: 0.3 K/UL (ref 0–0.5)
EOSINOPHIL NFR BLD: 3 % (ref 0–8)
ERYTHROCYTE [DISTWIDTH] IN BLOOD BY AUTOMATED COUNT: 13.8 % (ref 11.5–14.5)
GLUCOSE SERPL-MCNC: 173 MG/DL (ref 70–140)
HCT VFR BLD AUTO: 36.1 % (ref 37–48.5)
HGB BLD-MCNC: 11.7 G/DL (ref 12–16)
IMM GRANULOCYTES # BLD AUTO: 0.07 K/UL (ref 0–0.04)
IMM GRANULOCYTES NFR BLD AUTO: 0.7 % (ref 0–0.5)
LYMPHOCYTES # BLD AUTO: 1.7 K/UL (ref 1–4.8)
LYMPHOCYTES NFR BLD: 16.4 % (ref 18–48)
MCH RBC QN AUTO: 28.3 PG (ref 27–31)
MCHC RBC AUTO-ENTMCNC: 32.4 G/DL (ref 32–36)
MCV RBC AUTO: 87 FL (ref 82–98)
MONOCYTES # BLD AUTO: 0.6 K/UL (ref 0.3–1)
MONOCYTES NFR BLD: 6.1 % (ref 4–15)
NEUTROPHILS # BLD AUTO: 7.7 K/UL (ref 1.8–7.7)
NEUTROPHILS NFR BLD: 73.5 % (ref 38–73)
NRBC BLD-RTO: 0 /100 WBC
PLATELET # BLD AUTO: 283 K/UL (ref 150–350)
PMV BLD AUTO: 10.3 FL (ref 9.2–12.9)
RBC # BLD AUTO: 4.14 M/UL (ref 4–5.4)
WBC # BLD AUTO: 10.5 K/UL (ref 3.9–12.7)

## 2020-11-27 PROCEDURE — 36415 COLL VENOUS BLD VENIPUNCTURE: CPT

## 2020-11-27 PROCEDURE — 0502F PR SUBSEQUENT PRENATAL CARE: ICD-10-PCS | Mod: CPTII,S$GLB,, | Performed by: OBSTETRICS & GYNECOLOGY

## 2020-11-27 PROCEDURE — 99999 PR PBB SHADOW E&M-EST. PATIENT-LVL III: CPT | Mod: PBBFAC,,, | Performed by: OBSTETRICS & GYNECOLOGY

## 2020-11-27 PROCEDURE — 0502F SUBSEQUENT PRENATAL CARE: CPT | Mod: CPTII,S$GLB,, | Performed by: OBSTETRICS & GYNECOLOGY

## 2020-11-27 PROCEDURE — 82950 GLUCOSE TEST: CPT

## 2020-11-27 PROCEDURE — 85025 COMPLETE CBC W/AUTO DIFF WBC: CPT

## 2020-11-27 PROCEDURE — 99999 PR PBB SHADOW E&M-EST. PATIENT-LVL III: ICD-10-PCS | Mod: PBBFAC,,, | Performed by: OBSTETRICS & GYNECOLOGY

## 2020-11-27 RX ORDER — FAMOTIDINE 20 MG/1
20 TABLET, FILM COATED ORAL 2 TIMES DAILY
Qty: 60 TABLET | Refills: 1 | Status: SHIPPED | OUTPATIENT
Start: 2020-11-27 | End: 2021-02-02

## 2020-11-27 NOTE — PROGRESS NOTES
Patient with no complaints. Denies vaginal bleeding or contractions. Good FM. Needs 3 hour GTT, discussed.   labor precautions discussed with patient. RTC in 2 weeks.     Coffective counseling sheet Nourish discussed with mother. Reinforced basic breastfeeding position and latch as well as proper hand expression technique and avoidance of artificial nipples and formula unless medically indicated. Encouraged mother to download Coffective mobile mandy if she has not already done so.  Mother verbalizes understanding.

## 2020-11-29 ENCOUNTER — PATIENT MESSAGE (OUTPATIENT)
Dept: OBSTETRICS AND GYNECOLOGY | Facility: CLINIC | Age: 25
End: 2020-11-29

## 2020-12-01 ENCOUNTER — PATIENT MESSAGE (OUTPATIENT)
Dept: OBSTETRICS AND GYNECOLOGY | Facility: CLINIC | Age: 25
End: 2020-12-01

## 2020-12-01 ENCOUNTER — LAB VISIT (OUTPATIENT)
Dept: LAB | Facility: HOSPITAL | Age: 25
End: 2020-12-01
Attending: OBSTETRICS & GYNECOLOGY
Payer: COMMERCIAL

## 2020-12-01 DIAGNOSIS — R73.09 GLUCOSE TOLERANCE TEST ABNORMAL: ICD-10-CM

## 2020-12-01 LAB
GLUCOSE SERPL-MCNC: 141 MG/DL
GLUCOSE SERPL-MCNC: 190 MG/DL
GLUCOSE SERPL-MCNC: 239 MG/DL
GLUCOSE SERPL-MCNC: 94 MG/DL (ref 70–110)

## 2020-12-01 PROCEDURE — 36415 COLL VENOUS BLD VENIPUNCTURE: CPT

## 2020-12-01 PROCEDURE — 82951 GLUCOSE TOLERANCE TEST (GTT): CPT

## 2020-12-01 PROCEDURE — 82952 GTT-ADDED SAMPLES: CPT

## 2020-12-01 RX ORDER — LANCETS 33 GAUGE
1 EACH MISCELLANEOUS 4 TIMES DAILY
Qty: 100 EACH | Refills: 10 | Status: ON HOLD | OUTPATIENT
Start: 2020-12-01 | End: 2021-02-26 | Stop reason: HOSPADM

## 2020-12-01 RX ORDER — CALCIUM CITRATE/VITAMIN D3 200MG-6.25
1 TABLET ORAL 4 TIMES DAILY
Qty: 200 STRIP | Refills: 4 | Status: SHIPPED | OUTPATIENT
Start: 2020-12-01 | End: 2020-12-22

## 2020-12-01 RX ORDER — DEXTROSE 4 G
1 TABLET,CHEWABLE ORAL 4 TIMES DAILY
Qty: 1 EACH | Refills: 0 | Status: SHIPPED | OUTPATIENT
Start: 2020-12-01 | End: 2020-12-22

## 2020-12-09 ENCOUNTER — ROUTINE PRENATAL (OUTPATIENT)
Dept: OBSTETRICS AND GYNECOLOGY | Facility: CLINIC | Age: 25
End: 2020-12-09
Payer: COMMERCIAL

## 2020-12-09 VITALS
SYSTOLIC BLOOD PRESSURE: 128 MMHG | DIASTOLIC BLOOD PRESSURE: 76 MMHG | HEART RATE: 96 BPM | WEIGHT: 281.38 LBS | BODY MASS INDEX: 45.44 KG/M2

## 2020-12-09 DIAGNOSIS — O24.419 GESTATIONAL DIABETES MELLITUS (GDM) IN THIRD TRIMESTER, GESTATIONAL DIABETES METHOD OF CONTROL UNSPECIFIED: ICD-10-CM

## 2020-12-09 DIAGNOSIS — Z3A.28 28 WEEKS GESTATION OF PREGNANCY: Primary | ICD-10-CM

## 2020-12-09 DIAGNOSIS — Z23 NEED FOR DIPHTHERIA-TETANUS-PERTUSSIS (TDAP) VACCINE: ICD-10-CM

## 2020-12-09 PROCEDURE — 90715 TDAP VACCINE GREATER THAN OR EQUAL TO 7YO IM: ICD-10-PCS | Mod: S$GLB,,, | Performed by: OBSTETRICS & GYNECOLOGY

## 2020-12-09 PROCEDURE — 90471 IMMUNIZATION ADMIN: CPT | Mod: S$GLB,,, | Performed by: OBSTETRICS & GYNECOLOGY

## 2020-12-09 PROCEDURE — 90471 TDAP VACCINE GREATER THAN OR EQUAL TO 7YO IM: ICD-10-PCS | Mod: S$GLB,,, | Performed by: OBSTETRICS & GYNECOLOGY

## 2020-12-09 PROCEDURE — 90715 TDAP VACCINE 7 YRS/> IM: CPT | Mod: S$GLB,,, | Performed by: OBSTETRICS & GYNECOLOGY

## 2020-12-09 PROCEDURE — 99999 PR PBB SHADOW E&M-EST. PATIENT-LVL III: CPT | Mod: PBBFAC,,, | Performed by: OBSTETRICS & GYNECOLOGY

## 2020-12-09 PROCEDURE — 99999 PR PBB SHADOW E&M-EST. PATIENT-LVL III: ICD-10-PCS | Mod: PBBFAC,,, | Performed by: OBSTETRICS & GYNECOLOGY

## 2020-12-09 PROCEDURE — 0502F SUBSEQUENT PRENATAL CARE: CPT | Mod: CPTII,S$GLB,, | Performed by: OBSTETRICS & GYNECOLOGY

## 2020-12-09 PROCEDURE — 0502F PR SUBSEQUENT PRENATAL CARE: ICD-10-PCS | Mod: CPTII,S$GLB,, | Performed by: OBSTETRICS & GYNECOLOGY

## 2020-12-09 NOTE — PROGRESS NOTES
Patient with no complaints. Denies vaginal bleeding or contractions. Good FM. Tdap discussed and ordered today.  Discussed fetal kick count instructions with patient to monitor fetal movement. RTC in 2 weeks.    Pt diagnosed with GDM  Pt has been following DM diet and monitoring glucose  Fastin, 78, 103, 95, 95, 88, 89  Around meals:  (instructed to check 2 hours after meals)     Coffective counseling sheet Keep Baby Close discussed with mother. Reinforced rooming in practices, continued skin to skin, and quiet hours as requested by mother.  Encouraged mother to download Coffective mobile mandy if she has not already done so. Mother verbalizes understanding.

## 2020-12-22 ENCOUNTER — ROUTINE PRENATAL (OUTPATIENT)
Dept: OBSTETRICS AND GYNECOLOGY | Facility: CLINIC | Age: 25
End: 2020-12-22
Payer: COMMERCIAL

## 2020-12-22 VITALS
BODY MASS INDEX: 45.73 KG/M2 | HEART RATE: 100 BPM | DIASTOLIC BLOOD PRESSURE: 84 MMHG | SYSTOLIC BLOOD PRESSURE: 128 MMHG | WEIGHT: 283.19 LBS

## 2020-12-22 DIAGNOSIS — Z3A.30 30 WEEKS GESTATION OF PREGNANCY: Primary | ICD-10-CM

## 2020-12-22 DIAGNOSIS — O24.410 DIET CONTROLLED GESTATIONAL DIABETES MELLITUS (GDM) IN THIRD TRIMESTER: ICD-10-CM

## 2020-12-22 PROCEDURE — 99999 PR PBB SHADOW E&M-EST. PATIENT-LVL III: ICD-10-PCS | Mod: PBBFAC,,, | Performed by: OBSTETRICS & GYNECOLOGY

## 2020-12-22 PROCEDURE — 0502F SUBSEQUENT PRENATAL CARE: CPT | Mod: CPTII,S$GLB,, | Performed by: OBSTETRICS & GYNECOLOGY

## 2020-12-22 PROCEDURE — 0502F PR SUBSEQUENT PRENATAL CARE: ICD-10-PCS | Mod: CPTII,S$GLB,, | Performed by: OBSTETRICS & GYNECOLOGY

## 2020-12-22 PROCEDURE — 99999 PR PBB SHADOW E&M-EST. PATIENT-LVL III: CPT | Mod: PBBFAC,,, | Performed by: OBSTETRICS & GYNECOLOGY

## 2020-12-22 NOTE — PROGRESS NOTES
Patient with no complaints. Denies vaginal bleeding or contractions. Good FM. Pt with MFM ultrasound scheduled in 2 weeks. RTC in 2 week.    Fastins-90s (one 101)  2hr pp: almost all under 120, but had one high 165 2 hours after lunch (pasta)    Coffective Motivation Document discussed with mother. Reinforced benefits of breastfeeding, risk of formula, and cue based feedings. Encouraged mother to download [employer]ive mobile mandy if she has not already done so. Mother verbalizes understanding.

## 2021-01-04 ENCOUNTER — ROUTINE PRENATAL (OUTPATIENT)
Dept: OBSTETRICS AND GYNECOLOGY | Facility: CLINIC | Age: 26
End: 2021-01-04
Payer: COMMERCIAL

## 2021-01-04 VITALS
DIASTOLIC BLOOD PRESSURE: 72 MMHG | HEART RATE: 105 BPM | BODY MASS INDEX: 45.47 KG/M2 | WEIGHT: 281.63 LBS | SYSTOLIC BLOOD PRESSURE: 114 MMHG

## 2021-01-04 DIAGNOSIS — O24.410 DIET CONTROLLED GESTATIONAL DIABETES MELLITUS (GDM) IN THIRD TRIMESTER: ICD-10-CM

## 2021-01-04 DIAGNOSIS — Z3A.31 31 WEEKS GESTATION OF PREGNANCY: Primary | ICD-10-CM

## 2021-01-04 PROCEDURE — 0502F SUBSEQUENT PRENATAL CARE: CPT | Mod: CPTII,S$GLB,, | Performed by: OBSTETRICS & GYNECOLOGY

## 2021-01-04 PROCEDURE — 99999 PR PBB SHADOW E&M-EST. PATIENT-LVL III: ICD-10-PCS | Mod: PBBFAC,,, | Performed by: OBSTETRICS & GYNECOLOGY

## 2021-01-04 PROCEDURE — 99999 PR PBB SHADOW E&M-EST. PATIENT-LVL III: CPT | Mod: PBBFAC,,, | Performed by: OBSTETRICS & GYNECOLOGY

## 2021-01-04 PROCEDURE — 0502F PR SUBSEQUENT PRENATAL CARE: ICD-10-PCS | Mod: CPTII,S$GLB,, | Performed by: OBSTETRICS & GYNECOLOGY

## 2021-01-07 ENCOUNTER — PROCEDURE VISIT (OUTPATIENT)
Dept: MATERNAL FETAL MEDICINE | Facility: CLINIC | Age: 26
End: 2021-01-07
Payer: COMMERCIAL

## 2021-01-07 DIAGNOSIS — Z36.89 ENCOUNTER FOR ULTRASOUND TO CHECK FETAL GROWTH: ICD-10-CM

## 2021-01-07 DIAGNOSIS — O99.210 MATERNAL OBESITY AFFECTING PREGNANCY, ANTEPARTUM: ICD-10-CM

## 2021-01-07 PROCEDURE — 76816 OB US FOLLOW-UP PER FETUS: CPT | Mod: S$GLB,,, | Performed by: OBSTETRICS & GYNECOLOGY

## 2021-01-07 PROCEDURE — 76816 PR  US,PREGNANT UTERUS,F/U,TRANSABD APP: ICD-10-PCS | Mod: S$GLB,,, | Performed by: OBSTETRICS & GYNECOLOGY

## 2021-01-20 ENCOUNTER — ROUTINE PRENATAL (OUTPATIENT)
Dept: OBSTETRICS AND GYNECOLOGY | Facility: CLINIC | Age: 26
End: 2021-01-20
Payer: COMMERCIAL

## 2021-01-20 VITALS
HEART RATE: 125 BPM | BODY MASS INDEX: 45.6 KG/M2 | SYSTOLIC BLOOD PRESSURE: 112 MMHG | DIASTOLIC BLOOD PRESSURE: 70 MMHG | WEIGHT: 282.38 LBS

## 2021-01-20 DIAGNOSIS — O24.415 GESTATIONAL DIABETES MELLITUS (GDM) IN THIRD TRIMESTER CONTROLLED ON ORAL HYPOGLYCEMIC DRUG: ICD-10-CM

## 2021-01-20 DIAGNOSIS — Z3A.34 34 WEEKS GESTATION OF PREGNANCY: Primary | ICD-10-CM

## 2021-01-20 PROCEDURE — 99999 PR PBB SHADOW E&M-EST. PATIENT-LVL III: CPT | Mod: PBBFAC,,, | Performed by: OBSTETRICS & GYNECOLOGY

## 2021-01-20 PROCEDURE — 0502F SUBSEQUENT PRENATAL CARE: CPT | Mod: CPTII,S$GLB,, | Performed by: OBSTETRICS & GYNECOLOGY

## 2021-01-20 PROCEDURE — 99999 PR PBB SHADOW E&M-EST. PATIENT-LVL III: ICD-10-PCS | Mod: PBBFAC,,, | Performed by: OBSTETRICS & GYNECOLOGY

## 2021-01-20 PROCEDURE — 0502F PR SUBSEQUENT PRENATAL CARE: ICD-10-PCS | Mod: CPTII,S$GLB,, | Performed by: OBSTETRICS & GYNECOLOGY

## 2021-01-20 RX ORDER — METFORMIN HYDROCHLORIDE 500 MG/1
500 TABLET ORAL
Qty: 30 TABLET | Refills: 1 | Status: ON HOLD | OUTPATIENT
Start: 2021-01-20 | End: 2021-02-26 | Stop reason: HOSPADM

## 2021-01-26 ENCOUNTER — CLINICAL SUPPORT (OUTPATIENT)
Dept: OBSTETRICS AND GYNECOLOGY | Facility: CLINIC | Age: 26
End: 2021-01-26
Payer: COMMERCIAL

## 2021-01-26 DIAGNOSIS — O24.415 GESTATIONAL DIABETES MELLITUS (GDM) IN THIRD TRIMESTER CONTROLLED ON ORAL HYPOGLYCEMIC DRUG: Primary | ICD-10-CM

## 2021-01-26 PROCEDURE — 59025 FETAL NON-STRESS TEST: CPT | Mod: S$GLB,,, | Performed by: OBSTETRICS & GYNECOLOGY

## 2021-01-26 PROCEDURE — 59025 PR FETAL 2N-STRESS TEST: ICD-10-PCS | Mod: S$GLB,,, | Performed by: OBSTETRICS & GYNECOLOGY

## 2021-02-02 ENCOUNTER — PROCEDURE VISIT (OUTPATIENT)
Dept: OBSTETRICS AND GYNECOLOGY | Facility: CLINIC | Age: 26
End: 2021-02-02
Payer: COMMERCIAL

## 2021-02-02 ENCOUNTER — ROUTINE PRENATAL (OUTPATIENT)
Dept: OBSTETRICS AND GYNECOLOGY | Facility: CLINIC | Age: 26
End: 2021-02-02
Payer: COMMERCIAL

## 2021-02-02 VITALS
WEIGHT: 284.38 LBS | BODY MASS INDEX: 45.93 KG/M2 | SYSTOLIC BLOOD PRESSURE: 124 MMHG | DIASTOLIC BLOOD PRESSURE: 82 MMHG | HEART RATE: 102 BPM

## 2021-02-02 DIAGNOSIS — O26.892 HEARTBURN DURING PREGNANCY, SECOND TRIMESTER: ICD-10-CM

## 2021-02-02 DIAGNOSIS — O99.891 CURRENT MATERNAL CONDITION AFFECTING PREGNANCY: ICD-10-CM

## 2021-02-02 DIAGNOSIS — Z3A.36 36 WEEKS GESTATION OF PREGNANCY: ICD-10-CM

## 2021-02-02 DIAGNOSIS — Z36.4 ANTENATAL SCREENING FOR FETAL GROWTH RETARDATION USING ULTRASONICS: ICD-10-CM

## 2021-02-02 DIAGNOSIS — O24.415 GESTATIONAL DIABETES MELLITUS (GDM) IN THIRD TRIMESTER CONTROLLED ON ORAL HYPOGLYCEMIC DRUG: ICD-10-CM

## 2021-02-02 DIAGNOSIS — Z3A.36 36 WEEKS GESTATION OF PREGNANCY: Primary | ICD-10-CM

## 2021-02-02 DIAGNOSIS — R12 HEARTBURN DURING PREGNANCY, SECOND TRIMESTER: ICD-10-CM

## 2021-02-02 PROCEDURE — 76816 OB US FOLLOW-UP PER FETUS: CPT | Mod: S$GLB,,, | Performed by: OBSTETRICS & GYNECOLOGY

## 2021-02-02 PROCEDURE — 76816 PR  US,PREGNANT UTERUS,F/U,TRANSABD APP: ICD-10-PCS | Mod: S$GLB,,, | Performed by: OBSTETRICS & GYNECOLOGY

## 2021-02-02 PROCEDURE — 76819 PR US, OB, FETAL BIOPHYSICAL, W/O NST: ICD-10-PCS | Mod: S$GLB,,, | Performed by: OBSTETRICS & GYNECOLOGY

## 2021-02-02 PROCEDURE — 99999 PR PBB SHADOW E&M-EST. PATIENT-LVL III: ICD-10-PCS | Mod: PBBFAC,,, | Performed by: OBSTETRICS & GYNECOLOGY

## 2021-02-02 PROCEDURE — 99999 PR PBB SHADOW E&M-EST. PATIENT-LVL III: CPT | Mod: PBBFAC,,, | Performed by: OBSTETRICS & GYNECOLOGY

## 2021-02-02 PROCEDURE — 76819 FETAL BIOPHYS PROFIL W/O NST: CPT | Mod: S$GLB,,, | Performed by: OBSTETRICS & GYNECOLOGY

## 2021-02-02 PROCEDURE — 87081 CULTURE SCREEN ONLY: CPT

## 2021-02-02 PROCEDURE — 0502F PR SUBSEQUENT PRENATAL CARE: ICD-10-PCS | Mod: CPTII,S$GLB,, | Performed by: OBSTETRICS & GYNECOLOGY

## 2021-02-02 PROCEDURE — 0502F SUBSEQUENT PRENATAL CARE: CPT | Mod: CPTII,S$GLB,, | Performed by: OBSTETRICS & GYNECOLOGY

## 2021-02-02 RX ORDER — FAMOTIDINE 20 MG/1
20 TABLET, FILM COATED ORAL 2 TIMES DAILY
Qty: 60 TABLET | Refills: 1 | Status: SHIPPED | OUTPATIENT
Start: 2021-02-02 | End: 2021-02-08

## 2021-02-05 LAB — BACTERIA SPEC AEROBE CULT: NORMAL

## 2021-02-08 ENCOUNTER — ROUTINE PRENATAL (OUTPATIENT)
Dept: OBSTETRICS AND GYNECOLOGY | Facility: CLINIC | Age: 26
End: 2021-02-08
Payer: COMMERCIAL

## 2021-02-08 ENCOUNTER — HOSPITAL ENCOUNTER (OUTPATIENT)
Dept: OBSTETRICS AND GYNECOLOGY | Facility: CLINIC | Age: 26
Discharge: HOME | End: 2021-02-08
Payer: COMMERCIAL

## 2021-02-08 VITALS
DIASTOLIC BLOOD PRESSURE: 82 MMHG | HEART RATE: 125 BPM | SYSTOLIC BLOOD PRESSURE: 118 MMHG | WEIGHT: 283 LBS | BODY MASS INDEX: 45.7 KG/M2

## 2021-02-08 DIAGNOSIS — O24.415 GESTATIONAL DIABETES MELLITUS (GDM) IN THIRD TRIMESTER CONTROLLED ON ORAL HYPOGLYCEMIC DRUG: ICD-10-CM

## 2021-02-08 DIAGNOSIS — Z3A.36 36 WEEKS GESTATION OF PREGNANCY: Primary | ICD-10-CM

## 2021-02-08 PROCEDURE — 0502F PR SUBSEQUENT PRENATAL CARE: ICD-10-PCS | Mod: CPTII,S$GLB,, | Performed by: OBSTETRICS & GYNECOLOGY

## 2021-02-08 PROCEDURE — 0502F SUBSEQUENT PRENATAL CARE: CPT | Mod: CPTII,S$GLB,, | Performed by: OBSTETRICS & GYNECOLOGY

## 2021-02-08 PROCEDURE — 99999 PR PBB SHADOW E&M-EST. PATIENT-LVL II: ICD-10-PCS | Mod: PBBFAC,,, | Performed by: OBSTETRICS & GYNECOLOGY

## 2021-02-08 PROCEDURE — 76819 FETAL BIOPHYS PROFIL W/O NST: CPT | Mod: S$GLB,,, | Performed by: OBSTETRICS & GYNECOLOGY

## 2021-02-08 PROCEDURE — 99999 PR PBB SHADOW E&M-EST. PATIENT-LVL II: CPT | Mod: PBBFAC,,, | Performed by: OBSTETRICS & GYNECOLOGY

## 2021-02-08 PROCEDURE — 76819 PR US, OB, FETAL BIOPHYSICAL, W/O NST: ICD-10-PCS | Mod: S$GLB,,, | Performed by: OBSTETRICS & GYNECOLOGY

## 2021-02-09 ENCOUNTER — TELEPHONE (OUTPATIENT)
Dept: OBSTETRICS AND GYNECOLOGY | Facility: CLINIC | Age: 26
End: 2021-02-09

## 2021-02-17 ENCOUNTER — ROUTINE PRENATAL (OUTPATIENT)
Dept: OBSTETRICS AND GYNECOLOGY | Facility: CLINIC | Age: 26
End: 2021-02-17
Payer: COMMERCIAL

## 2021-02-17 ENCOUNTER — HOSPITAL ENCOUNTER (OUTPATIENT)
Dept: OBSTETRICS AND GYNECOLOGY | Facility: CLINIC | Age: 26
Discharge: HOME | End: 2021-02-17
Payer: COMMERCIAL

## 2021-02-17 VITALS
WEIGHT: 284.63 LBS | DIASTOLIC BLOOD PRESSURE: 82 MMHG | SYSTOLIC BLOOD PRESSURE: 124 MMHG | HEART RATE: 124 BPM | BODY MASS INDEX: 45.96 KG/M2

## 2021-02-17 DIAGNOSIS — O24.415 GESTATIONAL DIABETES MELLITUS (GDM) CONTROLLED ON ORAL HYPOGLYCEMIC DRUG: ICD-10-CM

## 2021-02-17 DIAGNOSIS — O24.415 GESTATIONAL DIABETES MELLITUS (GDM) IN THIRD TRIMESTER CONTROLLED ON ORAL HYPOGLYCEMIC DRUG: ICD-10-CM

## 2021-02-17 DIAGNOSIS — Z3A.38 38 WEEKS GESTATION OF PREGNANCY: Primary | ICD-10-CM

## 2021-02-17 PROCEDURE — 76819 PR US, OB, FETAL BIOPHYSICAL, W/O NST: ICD-10-PCS | Mod: S$GLB,,, | Performed by: OBSTETRICS & GYNECOLOGY

## 2021-02-17 PROCEDURE — 99999 PR PBB SHADOW E&M-EST. PATIENT-LVL III: CPT | Mod: PBBFAC,,, | Performed by: OBSTETRICS & GYNECOLOGY

## 2021-02-17 PROCEDURE — 76819 FETAL BIOPHYS PROFIL W/O NST: CPT | Mod: S$GLB,,, | Performed by: OBSTETRICS & GYNECOLOGY

## 2021-02-17 PROCEDURE — 99999 PR PBB SHADOW E&M-EST. PATIENT-LVL III: ICD-10-PCS | Mod: PBBFAC,,, | Performed by: OBSTETRICS & GYNECOLOGY

## 2021-02-17 PROCEDURE — 0502F PR SUBSEQUENT PRENATAL CARE: ICD-10-PCS | Mod: CPTII,S$GLB,, | Performed by: OBSTETRICS & GYNECOLOGY

## 2021-02-17 PROCEDURE — 0502F SUBSEQUENT PRENATAL CARE: CPT | Mod: CPTII,S$GLB,, | Performed by: OBSTETRICS & GYNECOLOGY

## 2021-02-17 RX ORDER — ONDANSETRON 4 MG/1
8 TABLET, ORALLY DISINTEGRATING ORAL EVERY 8 HOURS PRN
Status: CANCELLED | OUTPATIENT
Start: 2021-02-17

## 2021-02-17 RX ORDER — SODIUM CHLORIDE 9 MG/ML
INJECTION, SOLUTION INTRAVENOUS
Status: CANCELLED | OUTPATIENT
Start: 2021-02-17

## 2021-02-17 RX ORDER — OXYTOCIN/RINGER'S LACTATE 30/500 ML
334 PLASTIC BAG, INJECTION (ML) INTRAVENOUS ONCE
Status: CANCELLED | OUTPATIENT
Start: 2021-02-17 | End: 2021-02-17

## 2021-02-17 RX ORDER — MISOPROSTOL 100 UG/1
800 TABLET ORAL
Status: CANCELLED | OUTPATIENT
Start: 2021-02-17

## 2021-02-17 RX ORDER — CALCIUM CARBONATE 200(500)MG
500 TABLET,CHEWABLE ORAL 3 TIMES DAILY PRN
Status: CANCELLED | OUTPATIENT
Start: 2021-02-17

## 2021-02-17 RX ORDER — OXYTOCIN/RINGER'S LACTATE 30/500 ML
95 PLASTIC BAG, INJECTION (ML) INTRAVENOUS ONCE
Status: CANCELLED | OUTPATIENT
Start: 2021-02-17 | End: 2021-02-17

## 2021-02-17 RX ORDER — SIMETHICONE 80 MG
1 TABLET,CHEWABLE ORAL 4 TIMES DAILY PRN
Status: CANCELLED | OUTPATIENT
Start: 2021-02-17

## 2021-02-24 ENCOUNTER — ANESTHESIA EVENT (OUTPATIENT)
Dept: SURGERY | Facility: HOSPITAL | Age: 26
End: 2021-02-24
Payer: COMMERCIAL

## 2021-02-24 ENCOUNTER — HOSPITAL ENCOUNTER (INPATIENT)
Facility: HOSPITAL | Age: 26
LOS: 2 days | Discharge: HOME | End: 2021-02-26
Attending: OBSTETRICS & GYNECOLOGY | Admitting: OBSTETRICS & GYNECOLOGY
Payer: COMMERCIAL

## 2021-02-24 ENCOUNTER — ANESTHESIA (OUTPATIENT)
Dept: SURGERY | Facility: HOSPITAL | Age: 26
End: 2021-02-24
Payer: MEDICAID

## 2021-02-24 DIAGNOSIS — Z98.891 STATUS POST PRIMARY LOW TRANSVERSE CESAREAN SECTION: Primary | ICD-10-CM

## 2021-02-24 DIAGNOSIS — O24.415 GESTATIONAL DIABETES MELLITUS (GDM) CONTROLLED ON ORAL HYPOGLYCEMIC DRUG: ICD-10-CM

## 2021-02-24 DIAGNOSIS — O24.415 GESTATIONAL DIABETES MELLITUS (GDM) IN THIRD TRIMESTER CONTROLLED ON ORAL HYPOGLYCEMIC DRUG: ICD-10-CM

## 2021-02-24 PROBLEM — Z3A.21 21 WEEKS GESTATION OF PREGNANCY: Status: RESOLVED | Noted: 2020-10-26 | Resolved: 2021-02-24

## 2021-02-24 LAB
ABO + RH BLD: NORMAL
BASOPHILS # BLD AUTO: 0.04 K/UL (ref 0–0.2)
BASOPHILS NFR BLD: 0.4 % (ref 0–1.9)
BLD GP AB SCN CELLS X3 SERPL QL: NORMAL
DIFFERENTIAL METHOD: ABNORMAL
EOSINOPHIL # BLD AUTO: 0.3 K/UL (ref 0–0.5)
EOSINOPHIL NFR BLD: 3 % (ref 0–8)
ERYTHROCYTE [DISTWIDTH] IN BLOOD BY AUTOMATED COUNT: 14.6 % (ref 11.5–14.5)
HCT VFR BLD AUTO: 38.8 % (ref 37–48.5)
HGB BLD-MCNC: 12.8 G/DL (ref 12–16)
IMM GRANULOCYTES # BLD AUTO: 0.03 K/UL (ref 0–0.04)
IMM GRANULOCYTES NFR BLD AUTO: 0.3 % (ref 0–0.5)
LYMPHOCYTES # BLD AUTO: 2.1 K/UL (ref 1–4.8)
LYMPHOCYTES NFR BLD: 19.1 % (ref 18–48)
MCH RBC QN AUTO: 27.3 PG (ref 27–31)
MCHC RBC AUTO-ENTMCNC: 33 G/DL (ref 32–36)
MCV RBC AUTO: 83 FL (ref 82–98)
MONOCYTES # BLD AUTO: 0.7 K/UL (ref 0.3–1)
MONOCYTES NFR BLD: 5.8 % (ref 4–15)
NEUTROPHILS # BLD AUTO: 8 K/UL (ref 1.8–7.7)
NEUTROPHILS NFR BLD: 71.4 % (ref 38–73)
NRBC BLD-RTO: 0 /100 WBC
PLATELET # BLD AUTO: 283 K/UL (ref 150–350)
PMV BLD AUTO: 10.8 FL (ref 9.2–12.9)
POCT GLUCOSE: 86 MG/DL (ref 70–110)
POCT GLUCOSE: 90 MG/DL (ref 70–110)
POCT GLUCOSE: 93 MG/DL (ref 70–110)
POCT GLUCOSE: 96 MG/DL (ref 70–110)
RBC # BLD AUTO: 4.69 M/UL (ref 4–5.4)
RPR SER QL: NORMAL
SARS-COV-2 RDRP RESP QL NAA+PROBE: NEGATIVE
WBC # BLD AUTO: 11.19 K/UL (ref 3.9–12.7)

## 2021-02-24 PROCEDURE — 11000001 HC ACUTE MED/SURG PRIVATE ROOM

## 2021-02-24 PROCEDURE — 72100002 HC LABOR CARE, 1ST 8 HOURS

## 2021-02-24 PROCEDURE — U0002 COVID-19 LAB TEST NON-CDC: HCPCS

## 2021-02-24 PROCEDURE — 62326 NJX INTERLAMINAR LMBR/SAC: CPT | Performed by: NURSE ANESTHETIST, CERTIFIED REGISTERED

## 2021-02-24 PROCEDURE — 36000709 HC OR TIME LEV III EA ADD 15 MIN: Performed by: OBSTETRICS & GYNECOLOGY

## 2021-02-24 PROCEDURE — 59514 PR CESAREAN DELIVERY ONLY: ICD-10-PCS | Mod: 80,GB,, | Performed by: OBSTETRICS & GYNECOLOGY

## 2021-02-24 PROCEDURE — 36000708 HC OR TIME LEV III 1ST 15 MIN: Performed by: OBSTETRICS & GYNECOLOGY

## 2021-02-24 PROCEDURE — 25000003 PHARM REV CODE 250: Performed by: OBSTETRICS & GYNECOLOGY

## 2021-02-24 PROCEDURE — 85025 COMPLETE CBC W/AUTO DIFF WBC: CPT

## 2021-02-24 PROCEDURE — 86900 BLOOD TYPING SEROLOGIC ABO: CPT

## 2021-02-24 PROCEDURE — 59510 PR FULL ROUT OBSTE CARE,CESAREAN DELIV: ICD-10-PCS | Mod: GB,,, | Performed by: OBSTETRICS & GYNECOLOGY

## 2021-02-24 PROCEDURE — 25000003 PHARM REV CODE 250: Performed by: NURSE ANESTHETIST, CERTIFIED REGISTERED

## 2021-02-24 PROCEDURE — 63600175 PHARM REV CODE 636 W HCPCS: Performed by: NURSE ANESTHETIST, CERTIFIED REGISTERED

## 2021-02-24 PROCEDURE — 99900059 HC C-SECTION ATTEND (STAT)

## 2021-02-24 PROCEDURE — 63600175 PHARM REV CODE 636 W HCPCS: Performed by: OBSTETRICS & GYNECOLOGY

## 2021-02-24 PROCEDURE — 37000008 HC ANESTHESIA 1ST 15 MINUTES: Performed by: OBSTETRICS & GYNECOLOGY

## 2021-02-24 PROCEDURE — 63600175 PHARM REV CODE 636 W HCPCS

## 2021-02-24 PROCEDURE — 25000003 PHARM REV CODE 250

## 2021-02-24 PROCEDURE — 86592 SYPHILIS TEST NON-TREP QUAL: CPT

## 2021-02-24 PROCEDURE — 59510 CESAREAN DELIVERY: CPT | Mod: GB,,, | Performed by: OBSTETRICS & GYNECOLOGY

## 2021-02-24 PROCEDURE — 37000009 HC ANESTHESIA EA ADD 15 MINS: Performed by: OBSTETRICS & GYNECOLOGY

## 2021-02-24 PROCEDURE — 01967 NEURAXL LBR ANES VAG DLVR: CPT | Mod: QZ,P2 | Performed by: NURSE ANESTHETIST, CERTIFIED REGISTERED

## 2021-02-24 PROCEDURE — 59514 CESAREAN DELIVERY ONLY: CPT | Mod: 80,GB,, | Performed by: OBSTETRICS & GYNECOLOGY

## 2021-02-24 RX ORDER — OXYTOCIN/RINGER'S LACTATE 30/500 ML
95 PLASTIC BAG, INJECTION (ML) INTRAVENOUS ONCE
Status: COMPLETED | OUTPATIENT
Start: 2021-02-24 | End: 2021-02-24

## 2021-02-24 RX ORDER — ONDANSETRON 8 MG/1
8 TABLET, ORALLY DISINTEGRATING ORAL EVERY 8 HOURS PRN
Status: DISCONTINUED | OUTPATIENT
Start: 2021-02-24 | End: 2021-02-26 | Stop reason: HOSPADM

## 2021-02-24 RX ORDER — DIPHENHYDRAMINE HCL 25 MG
25 CAPSULE ORAL EVERY 4 HOURS PRN
Status: DISCONTINUED | OUTPATIENT
Start: 2021-02-24 | End: 2021-02-26 | Stop reason: HOSPADM

## 2021-02-24 RX ORDER — IBUPROFEN 800 MG/1
800 TABLET ORAL EVERY 8 HOURS
Status: DISCONTINUED | OUTPATIENT
Start: 2021-02-25 | End: 2021-02-26 | Stop reason: HOSPADM

## 2021-02-24 RX ORDER — DOCUSATE SODIUM 100 MG/1
200 CAPSULE, LIQUID FILLED ORAL 2 TIMES DAILY
Status: DISCONTINUED | OUTPATIENT
Start: 2021-02-24 | End: 2021-02-26 | Stop reason: HOSPADM

## 2021-02-24 RX ORDER — SODIUM CHLORIDE 9 MG/ML
INJECTION, SOLUTION INTRAVENOUS
Status: DISCONTINUED | OUTPATIENT
Start: 2021-02-24 | End: 2021-02-24

## 2021-02-24 RX ORDER — DOCUSATE SODIUM 100 MG/1
CAPSULE, LIQUID FILLED ORAL
Status: COMPLETED
Start: 2021-02-24 | End: 2021-02-24

## 2021-02-24 RX ORDER — SODIUM CITRATE AND CITRIC ACID MONOHYDRATE 334; 500 MG/5ML; MG/5ML
30 SOLUTION ORAL ONCE
Status: DISCONTINUED | OUTPATIENT
Start: 2021-02-24 | End: 2021-02-24 | Stop reason: SDUPTHER

## 2021-02-24 RX ORDER — ONDANSETRON HYDROCHLORIDE 2 MG/ML
INJECTION, SOLUTION INTRAMUSCULAR; INTRAVENOUS
Status: DISCONTINUED | OUTPATIENT
Start: 2021-02-24 | End: 2021-02-24

## 2021-02-24 RX ORDER — ROPIVACAINE HYDROCHLORIDE 2 MG/ML
INJECTION, SOLUTION EPIDURAL; INFILTRATION; PERINEURAL
Status: COMPLETED
Start: 2021-02-24 | End: 2021-02-24

## 2021-02-24 RX ORDER — OXYCODONE AND ACETAMINOPHEN 5; 325 MG/1; MG/1
1 TABLET ORAL EVERY 4 HOURS PRN
Status: DISCONTINUED | OUTPATIENT
Start: 2021-02-24 | End: 2021-02-26 | Stop reason: HOSPADM

## 2021-02-24 RX ORDER — ENOXAPARIN SODIUM 100 MG/ML
40 INJECTION SUBCUTANEOUS EVERY 24 HOURS
Status: COMPLETED | OUTPATIENT
Start: 2021-02-25 | End: 2021-02-25

## 2021-02-24 RX ORDER — DEXAMETHASONE SODIUM PHOSPHATE 4 MG/ML
INJECTION, SOLUTION INTRA-ARTICULAR; INTRALESIONAL; INTRAMUSCULAR; INTRAVENOUS; SOFT TISSUE
Status: DISCONTINUED | OUTPATIENT
Start: 2021-02-24 | End: 2021-02-24

## 2021-02-24 RX ORDER — METOCLOPRAMIDE HYDROCHLORIDE 5 MG/ML
10 INJECTION INTRAMUSCULAR; INTRAVENOUS ONCE
Status: DISCONTINUED | OUTPATIENT
Start: 2021-02-24 | End: 2021-02-24 | Stop reason: SDUPTHER

## 2021-02-24 RX ORDER — FENTANYL CITRATE 50 UG/ML
INJECTION, SOLUTION INTRAMUSCULAR; INTRAVENOUS
Status: DISCONTINUED | OUTPATIENT
Start: 2021-02-24 | End: 2021-02-24

## 2021-02-24 RX ORDER — SODIUM CHLORIDE, SODIUM LACTATE, POTASSIUM CHLORIDE, CALCIUM CHLORIDE 600; 310; 30; 20 MG/100ML; MG/100ML; MG/100ML; MG/100ML
INJECTION, SOLUTION INTRAVENOUS CONTINUOUS
Status: ACTIVE | OUTPATIENT
Start: 2021-02-24 | End: 2021-02-25

## 2021-02-24 RX ORDER — MISOPROSTOL 200 UG/1
800 TABLET ORAL
Status: DISCONTINUED | OUTPATIENT
Start: 2021-02-24 | End: 2021-02-24

## 2021-02-24 RX ORDER — SIMETHICONE 80 MG
1 TABLET,CHEWABLE ORAL 4 TIMES DAILY PRN
Status: DISCONTINUED | OUTPATIENT
Start: 2021-02-24 | End: 2021-02-24

## 2021-02-24 RX ORDER — ONDANSETRON 4 MG/1
8 TABLET, ORALLY DISINTEGRATING ORAL EVERY 8 HOURS PRN
Status: DISCONTINUED | OUTPATIENT
Start: 2021-02-24 | End: 2021-02-24

## 2021-02-24 RX ORDER — ACETAMINOPHEN 10 MG/ML
INJECTION, SOLUTION INTRAVENOUS
Status: DISCONTINUED | OUTPATIENT
Start: 2021-02-24 | End: 2021-02-24

## 2021-02-24 RX ORDER — HYDROMORPHONE HYDROCHLORIDE 2 MG/ML
1 INJECTION, SOLUTION INTRAMUSCULAR; INTRAVENOUS; SUBCUTANEOUS EVERY 4 HOURS PRN
Status: DISCONTINUED | OUTPATIENT
Start: 2021-02-24 | End: 2021-02-26 | Stop reason: HOSPADM

## 2021-02-24 RX ORDER — MIDAZOLAM HYDROCHLORIDE 1 MG/ML
INJECTION, SOLUTION INTRAMUSCULAR; INTRAVENOUS
Status: DISCONTINUED | OUTPATIENT
Start: 2021-02-24 | End: 2021-02-24

## 2021-02-24 RX ORDER — LIDOCAINE HCL/EPINEPHRINE/PF 2%-1:200K
VIAL (ML) INJECTION
Status: DISCONTINUED | OUTPATIENT
Start: 2021-02-24 | End: 2021-02-24

## 2021-02-24 RX ORDER — OXYTOCIN/RINGER'S LACTATE 30/500 ML
PLASTIC BAG, INJECTION (ML) INTRAVENOUS
Status: COMPLETED
Start: 2021-02-24 | End: 2021-02-24

## 2021-02-24 RX ORDER — HYDROCORTISONE 25 MG/G
CREAM TOPICAL 3 TIMES DAILY PRN
Status: DISCONTINUED | OUTPATIENT
Start: 2021-02-24 | End: 2021-02-26 | Stop reason: HOSPADM

## 2021-02-24 RX ORDER — BISACODYL 10 MG
10 SUPPOSITORY, RECTAL RECTAL ONCE AS NEEDED
Status: DISCONTINUED | OUTPATIENT
Start: 2021-02-24 | End: 2021-02-26 | Stop reason: HOSPADM

## 2021-02-24 RX ORDER — OXYTOCIN/RINGER'S LACTATE 30/500 ML
0-30 PLASTIC BAG, INJECTION (ML) INTRAVENOUS CONTINUOUS
Status: DISCONTINUED | OUTPATIENT
Start: 2021-02-24 | End: 2021-02-24

## 2021-02-24 RX ORDER — OXYTOCIN/RINGER'S LACTATE 30/500 ML
334 PLASTIC BAG, INJECTION (ML) INTRAVENOUS ONCE
Status: DISCONTINUED | OUTPATIENT
Start: 2021-02-24 | End: 2021-02-24 | Stop reason: SDUPTHER

## 2021-02-24 RX ORDER — ROPIVACAINE HYDROCHLORIDE 5 MG/ML
INJECTION, SOLUTION EPIDURAL; INFILTRATION; PERINEURAL
Status: DISCONTINUED | OUTPATIENT
Start: 2021-02-24 | End: 2021-02-24

## 2021-02-24 RX ORDER — CALCIUM CARBONATE 200(500)MG
TABLET,CHEWABLE ORAL
Status: COMPLETED
Start: 2021-02-24 | End: 2021-02-24

## 2021-02-24 RX ORDER — OXYCODONE AND ACETAMINOPHEN 10; 325 MG/1; MG/1
TABLET ORAL
Status: COMPLETED
Start: 2021-02-24 | End: 2021-02-24

## 2021-02-24 RX ORDER — ONDANSETRON 4 MG/1
TABLET, ORALLY DISINTEGRATING ORAL
Status: COMPLETED
Start: 2021-02-24 | End: 2021-02-24

## 2021-02-24 RX ORDER — PHENYLEPHRINE HYDROCHLORIDE 10 MG/ML
INJECTION INTRAVENOUS
Status: DISCONTINUED | OUTPATIENT
Start: 2021-02-24 | End: 2021-02-24

## 2021-02-24 RX ORDER — METOCLOPRAMIDE HYDROCHLORIDE 5 MG/ML
INJECTION INTRAMUSCULAR; INTRAVENOUS
Status: DISCONTINUED
Start: 2021-02-24 | End: 2021-02-24 | Stop reason: WASHOUT

## 2021-02-24 RX ORDER — OXYCODONE AND ACETAMINOPHEN 10; 325 MG/1; MG/1
1 TABLET ORAL EVERY 4 HOURS PRN
Status: DISCONTINUED | OUTPATIENT
Start: 2021-02-24 | End: 2021-02-26 | Stop reason: HOSPADM

## 2021-02-24 RX ORDER — FAMOTIDINE 10 MG/ML
20 INJECTION INTRAVENOUS ONCE
Status: DISCONTINUED | OUTPATIENT
Start: 2021-02-24 | End: 2021-02-24 | Stop reason: SDUPTHER

## 2021-02-24 RX ORDER — CEFAZOLIN SODIUM 1 G/3ML
INJECTION, POWDER, FOR SOLUTION INTRAMUSCULAR; INTRAVENOUS
Status: DISCONTINUED | OUTPATIENT
Start: 2021-02-24 | End: 2021-02-24

## 2021-02-24 RX ORDER — SODIUM CITRATE AND CITRIC ACID MONOHYDRATE 334; 500 MG/5ML; MG/5ML
SOLUTION ORAL
Status: COMPLETED
Start: 2021-02-24 | End: 2021-02-24

## 2021-02-24 RX ORDER — LIDOCAINE HCL/EPINEPHRINE/PF 2%-1:200K
VIAL (ML) INJECTION
Status: COMPLETED
Start: 2021-02-24 | End: 2021-02-24

## 2021-02-24 RX ORDER — FAMOTIDINE 10 MG/ML
INJECTION INTRAVENOUS
Status: COMPLETED
Start: 2021-02-24 | End: 2021-02-24

## 2021-02-24 RX ORDER — METOCLOPRAMIDE HYDROCHLORIDE 5 MG/ML
10 INJECTION INTRAMUSCULAR; INTRAVENOUS
Status: DISCONTINUED | OUTPATIENT
Start: 2021-02-24 | End: 2021-02-24

## 2021-02-24 RX ORDER — SODIUM CITRATE AND CITRIC ACID MONOHYDRATE 334; 500 MG/5ML; MG/5ML
30 SOLUTION ORAL
Status: DISCONTINUED | OUTPATIENT
Start: 2021-02-24 | End: 2021-02-24

## 2021-02-24 RX ORDER — ROPIVACAINE HYDROCHLORIDE 2 MG/ML
INJECTION, SOLUTION EPIDURAL; INFILTRATION; PERINEURAL
Status: DISCONTINUED | OUTPATIENT
Start: 2021-02-24 | End: 2021-02-24

## 2021-02-24 RX ORDER — OXYTOCIN/RINGER'S LACTATE 30/500 ML
95 PLASTIC BAG, INJECTION (ML) INTRAVENOUS ONCE
Status: DISCONTINUED | OUTPATIENT
Start: 2021-02-24 | End: 2021-02-24 | Stop reason: SDUPTHER

## 2021-02-24 RX ORDER — FAMOTIDINE 10 MG/ML
20 INJECTION INTRAVENOUS
Status: DISCONTINUED | OUTPATIENT
Start: 2021-02-24 | End: 2021-02-24

## 2021-02-24 RX ORDER — ONDANSETRON 4 MG/1
TABLET, ORALLY DISINTEGRATING ORAL
Status: DISPENSED
Start: 2021-02-24 | End: 2021-02-25

## 2021-02-24 RX ORDER — KETOROLAC TROMETHAMINE 30 MG/ML
INJECTION, SOLUTION INTRAMUSCULAR; INTRAVENOUS
Status: COMPLETED
Start: 2021-02-24 | End: 2021-02-24

## 2021-02-24 RX ORDER — KETOROLAC TROMETHAMINE 30 MG/ML
30 INJECTION, SOLUTION INTRAMUSCULAR; INTRAVENOUS EVERY 8 HOURS
Status: COMPLETED | OUTPATIENT
Start: 2021-02-24 | End: 2021-02-25

## 2021-02-24 RX ORDER — CALCIUM CARBONATE 200(500)MG
500 TABLET,CHEWABLE ORAL 3 TIMES DAILY PRN
Status: DISCONTINUED | OUTPATIENT
Start: 2021-02-24 | End: 2021-02-24

## 2021-02-24 RX ORDER — SODIUM CHLORIDE, SODIUM LACTATE, POTASSIUM CHLORIDE, CALCIUM CHLORIDE 600; 310; 30; 20 MG/100ML; MG/100ML; MG/100ML; MG/100ML
INJECTION, SOLUTION INTRAVENOUS CONTINUOUS
Status: DISCONTINUED | OUTPATIENT
Start: 2021-02-24 | End: 2021-02-24

## 2021-02-24 RX ORDER — FENTANYL/ROPIVACAINE/NS/PF 2MCG/ML-.2
PLASTIC BAG, INJECTION (ML) INJECTION
Status: COMPLETED
Start: 2021-02-24 | End: 2021-02-24

## 2021-02-24 RX ORDER — SODIUM CHLORIDE, SODIUM LACTATE, POTASSIUM CHLORIDE, CALCIUM CHLORIDE 600; 310; 30; 20 MG/100ML; MG/100ML; MG/100ML; MG/100ML
INJECTION, SOLUTION INTRAVENOUS CONTINUOUS PRN
Status: DISCONTINUED | OUTPATIENT
Start: 2021-02-24 | End: 2021-02-24

## 2021-02-24 RX ORDER — PRENATAL WITH FERROUS FUM AND FOLIC ACID 3080; 920; 120; 400; 22; 1.84; 3; 20; 10; 1; 12; 200; 27; 25; 2 [IU]/1; [IU]/1; MG/1; [IU]/1; MG/1; MG/1; MG/1; MG/1; MG/1; MG/1; UG/1; MG/1; MG/1; MG/1; MG/1
1 TABLET ORAL DAILY
Status: DISCONTINUED | OUTPATIENT
Start: 2021-02-25 | End: 2021-02-26 | Stop reason: HOSPADM

## 2021-02-24 RX ORDER — ADHESIVE BANDAGE
30 BANDAGE TOPICAL 2 TIMES DAILY PRN
Status: DISCONTINUED | OUTPATIENT
Start: 2021-02-25 | End: 2021-02-26 | Stop reason: HOSPADM

## 2021-02-24 RX ORDER — OXYTOCIN/RINGER'S LACTATE 30/500 ML
334 PLASTIC BAG, INJECTION (ML) INTRAVENOUS ONCE
Status: DISCONTINUED | OUTPATIENT
Start: 2021-02-24 | End: 2021-02-24

## 2021-02-24 RX ORDER — OXYTOCIN/RINGER'S LACTATE 30/500 ML
95 PLASTIC BAG, INJECTION (ML) INTRAVENOUS ONCE
Status: DISCONTINUED | OUTPATIENT
Start: 2021-02-24 | End: 2021-02-24

## 2021-02-24 RX ORDER — ROPIVACAINE HYDROCHLORIDE 5 MG/ML
INJECTION, SOLUTION EPIDURAL; INFILTRATION; PERINEURAL
Status: COMPLETED
Start: 2021-02-24 | End: 2021-02-24

## 2021-02-24 RX ORDER — AMOXICILLIN 250 MG
1 CAPSULE ORAL NIGHTLY PRN
Status: DISCONTINUED | OUTPATIENT
Start: 2021-02-24 | End: 2021-02-26 | Stop reason: HOSPADM

## 2021-02-24 RX ORDER — SIMETHICONE 80 MG
1 TABLET,CHEWABLE ORAL EVERY 6 HOURS PRN
Status: DISCONTINUED | OUTPATIENT
Start: 2021-02-24 | End: 2021-02-26 | Stop reason: HOSPADM

## 2021-02-24 RX ADMIN — ROPIVACAINE HYDROCHLORIDE 5 MG: 5 INJECTION, SOLUTION EPIDURAL; INFILTRATION; PERINEURAL at 05:02

## 2021-02-24 RX ADMIN — LIDOCAINE HYDROCHLORIDE,EPINEPHRINE BITARTRATE 8 MG: 20; .005 INJECTION, SOLUTION EPIDURAL; INFILTRATION; INTRACAUDAL; PERINEURAL at 05:02

## 2021-02-24 RX ADMIN — FENTANYL CITRATE 100 MCG: 50 INJECTION, SOLUTION INTRAMUSCULAR; INTRAVENOUS at 06:02

## 2021-02-24 RX ADMIN — DOCUSATE SODIUM 200 MG: 100 CAPSULE, LIQUID FILLED ORAL at 09:02

## 2021-02-24 RX ADMIN — DEXAMETHASONE SODIUM PHOSPHATE 8 MG: 4 INJECTION, SOLUTION INTRAMUSCULAR; INTRAVENOUS at 05:02

## 2021-02-24 RX ADMIN — OXYCODONE HYDROCHLORIDE AND ACETAMINOPHEN 1 TABLET: 10; 325 TABLET ORAL at 08:02

## 2021-02-24 RX ADMIN — ONDANSETRON 8 MG: 2 INJECTION, SOLUTION INTRAMUSCULAR; INTRAVENOUS at 05:02

## 2021-02-24 RX ADMIN — FAMOTIDINE 20 MG: 10 INJECTION INTRAVENOUS at 05:02

## 2021-02-24 RX ADMIN — PHENYLEPHRINE HYDROCHLORIDE 200 MCG: 10 INJECTION INTRAVENOUS at 06:02

## 2021-02-24 RX ADMIN — SODIUM CHLORIDE, SODIUM LACTATE, POTASSIUM CHLORIDE, AND CALCIUM CHLORIDE: .6; .31; .03; .02 INJECTION, SOLUTION INTRAVENOUS at 05:02

## 2021-02-24 RX ADMIN — AZITHROMYCIN MONOHYDRATE 500 MG: 500 INJECTION, POWDER, LYOPHILIZED, FOR SOLUTION INTRAVENOUS at 05:02

## 2021-02-24 RX ADMIN — Medication 2 MILLI-UNITS/MIN: at 06:02

## 2021-02-24 RX ADMIN — SODIUM CITRATE AND CITRIC ACID MONOHYDRATE 30 ML: 334; 500 SOLUTION ORAL at 05:02

## 2021-02-24 RX ADMIN — SODIUM CHLORIDE, SODIUM LACTATE, POTASSIUM CHLORIDE, AND CALCIUM CHLORIDE: .6; .31; .03; .02 INJECTION, SOLUTION INTRAVENOUS at 06:02

## 2021-02-24 RX ADMIN — CALCIUM CARBONATE (ANTACID) CHEW TAB 500 MG 500 MG: 500 CHEW TAB at 12:02

## 2021-02-24 RX ADMIN — ROPIVACAINE HYDROCHLORIDE 7 ML: 2 INJECTION, SOLUTION EPIDURAL; INFILTRATION at 04:02

## 2021-02-24 RX ADMIN — LIDOCAINE HYDROCHLORIDE,EPINEPHRINE BITARTRATE 6 MG: 20; .005 INJECTION, SOLUTION EPIDURAL; INFILTRATION; INTRACAUDAL; PERINEURAL at 12:02

## 2021-02-24 RX ADMIN — ONDANSETRON 8 MG: 4 TABLET, ORALLY DISINTEGRATING ORAL at 08:02

## 2021-02-24 RX ADMIN — ACETAMINOPHEN 1000 MG: 10 INJECTION, SOLUTION INTRAVENOUS at 05:02

## 2021-02-24 RX ADMIN — CEFAZOLIN 3 G: 1 INJECTION, POWDER, FOR SOLUTION INTRAVENOUS at 05:02

## 2021-02-24 RX ADMIN — SODIUM CHLORIDE, SODIUM LACTATE, POTASSIUM CHLORIDE, AND CALCIUM CHLORIDE: .6; .31; .03; .02 INJECTION, SOLUTION INTRAVENOUS at 07:02

## 2021-02-24 RX ADMIN — Medication 12 ML/HR: at 09:02

## 2021-02-24 RX ADMIN — FAMOTIDINE 20 MG: 10 INJECTION, SOLUTION INTRAVENOUS at 05:02

## 2021-02-24 RX ADMIN — ONDANSETRON 8 MG: 4 TABLET, ORALLY DISINTEGRATING ORAL at 01:02

## 2021-02-24 RX ADMIN — METOCLOPRAMIDE 10 MG: 5 INJECTION, SOLUTION INTRAMUSCULAR; INTRAVENOUS at 05:02

## 2021-02-24 RX ADMIN — SODIUM CHLORIDE, SODIUM LACTATE, POTASSIUM CHLORIDE, AND CALCIUM CHLORIDE: .6; .31; .03; .02 INJECTION, SOLUTION INTRAVENOUS at 09:02

## 2021-02-24 RX ADMIN — SODIUM CITRATE AND CITRIC ACID MONOHYDRATE 30 ML: 500; 334 SOLUTION ORAL at 05:02

## 2021-02-24 RX ADMIN — KETOROLAC TROMETHAMINE 30 MG: 30 INJECTION, SOLUTION INTRAMUSCULAR at 07:02

## 2021-02-24 RX ADMIN — CALCIUM CARBONATE (ANTACID) CHEW TAB 500 MG 500 MG: 500 CHEW TAB at 06:02

## 2021-02-24 RX ADMIN — MIDAZOLAM 2 MG: 1 INJECTION INTRAMUSCULAR; INTRAVENOUS at 06:02

## 2021-02-25 LAB
BASOPHILS # BLD AUTO: 0.01 K/UL (ref 0–0.2)
BASOPHILS NFR BLD: 0.1 % (ref 0–1.9)
DIFFERENTIAL METHOD: ABNORMAL
EOSINOPHIL # BLD AUTO: 0 K/UL (ref 0–0.5)
EOSINOPHIL NFR BLD: 0 % (ref 0–8)
ERYTHROCYTE [DISTWIDTH] IN BLOOD BY AUTOMATED COUNT: 14.7 % (ref 11.5–14.5)
HCT VFR BLD AUTO: 30.7 % (ref 37–48.5)
HGB BLD-MCNC: 10 G/DL (ref 12–16)
IMM GRANULOCYTES # BLD AUTO: 0.08 K/UL (ref 0–0.04)
IMM GRANULOCYTES NFR BLD AUTO: 0.5 % (ref 0–0.5)
LYMPHOCYTES # BLD AUTO: 1.9 K/UL (ref 1–4.8)
LYMPHOCYTES NFR BLD: 11.2 % (ref 18–48)
MCH RBC QN AUTO: 27.4 PG (ref 27–31)
MCHC RBC AUTO-ENTMCNC: 32.6 G/DL (ref 32–36)
MCV RBC AUTO: 84 FL (ref 82–98)
MONOCYTES # BLD AUTO: 1 K/UL (ref 0.3–1)
MONOCYTES NFR BLD: 5.9 % (ref 4–15)
NEUTROPHILS # BLD AUTO: 13.8 K/UL (ref 1.8–7.7)
NEUTROPHILS NFR BLD: 82.3 % (ref 38–73)
NRBC BLD-RTO: 0 /100 WBC
PLATELET # BLD AUTO: 245 K/UL (ref 150–350)
PMV BLD AUTO: 11.5 FL (ref 9.2–12.9)
RBC # BLD AUTO: 3.65 M/UL (ref 4–5.4)
WBC # BLD AUTO: 16.77 K/UL (ref 3.9–12.7)

## 2021-02-25 PROCEDURE — 36415 COLL VENOUS BLD VENIPUNCTURE: CPT

## 2021-02-25 PROCEDURE — 51702 INSERT TEMP BLADDER CATH: CPT

## 2021-02-25 PROCEDURE — 25000003 PHARM REV CODE 250: Performed by: OBSTETRICS & GYNECOLOGY

## 2021-02-25 PROCEDURE — 85025 COMPLETE CBC W/AUTO DIFF WBC: CPT

## 2021-02-25 PROCEDURE — 11000001 HC ACUTE MED/SURG PRIVATE ROOM

## 2021-02-25 PROCEDURE — 99024 PR POST-OP FOLLOW-UP VISIT: ICD-10-PCS | Mod: ,,, | Performed by: OBSTETRICS & GYNECOLOGY

## 2021-02-25 PROCEDURE — 72100003 HC LABOR CARE, EA. ADDL. 8 HRS

## 2021-02-25 PROCEDURE — 99024 POSTOP FOLLOW-UP VISIT: CPT | Mod: ,,, | Performed by: OBSTETRICS & GYNECOLOGY

## 2021-02-25 PROCEDURE — 63600175 PHARM REV CODE 636 W HCPCS: Performed by: OBSTETRICS & GYNECOLOGY

## 2021-02-25 RX ADMIN — PRENATAL VITAMINS-IRON FUMARATE 27 MG IRON-FOLIC ACID 0.8 MG TABLET 1 TABLET: at 10:02

## 2021-02-25 RX ADMIN — OXYCODONE HYDROCHLORIDE AND ACETAMINOPHEN 1 TABLET: 5; 325 TABLET ORAL at 05:02

## 2021-02-25 RX ADMIN — KETOROLAC TROMETHAMINE 30 MG: 30 INJECTION, SOLUTION INTRAMUSCULAR at 01:02

## 2021-02-25 RX ADMIN — KETOROLAC TROMETHAMINE 30 MG: 30 INJECTION, SOLUTION INTRAMUSCULAR at 04:02

## 2021-02-25 RX ADMIN — DOCUSATE SODIUM 200 MG: 100 CAPSULE, LIQUID FILLED ORAL at 11:02

## 2021-02-25 RX ADMIN — DOCUSATE SODIUM 200 MG: 100 CAPSULE, LIQUID FILLED ORAL at 09:02

## 2021-02-25 RX ADMIN — ENOXAPARIN SODIUM 40 MG: 40 INJECTION SUBCUTANEOUS at 01:02

## 2021-02-25 RX ADMIN — OXYCODONE HYDROCHLORIDE AND ACETAMINOPHEN 1 TABLET: 5; 325 TABLET ORAL at 11:02

## 2021-02-25 RX ADMIN — OXYCODONE HYDROCHLORIDE AND ACETAMINOPHEN 1 TABLET: 5; 325 TABLET ORAL at 09:02

## 2021-02-26 VITALS
BODY MASS INDEX: 45.64 KG/M2 | HEART RATE: 112 BPM | WEIGHT: 284 LBS | RESPIRATION RATE: 16 BRPM | TEMPERATURE: 98 F | HEIGHT: 66 IN | SYSTOLIC BLOOD PRESSURE: 137 MMHG | OXYGEN SATURATION: 97 % | DIASTOLIC BLOOD PRESSURE: 83 MMHG

## 2021-02-26 PROCEDURE — 25000003 PHARM REV CODE 250: Performed by: OBSTETRICS & GYNECOLOGY

## 2021-02-26 PROCEDURE — 99024 PR POST-OP FOLLOW-UP VISIT: ICD-10-PCS | Mod: ,,, | Performed by: STUDENT IN AN ORGANIZED HEALTH CARE EDUCATION/TRAINING PROGRAM

## 2021-02-26 PROCEDURE — 99024 POSTOP FOLLOW-UP VISIT: CPT | Mod: ,,, | Performed by: STUDENT IN AN ORGANIZED HEALTH CARE EDUCATION/TRAINING PROGRAM

## 2021-02-26 RX ORDER — OXYCODONE AND ACETAMINOPHEN 5; 325 MG/1; MG/1
1 TABLET ORAL EVERY 6 HOURS PRN
Qty: 15 TABLET | Refills: 0 | Status: SHIPPED | OUTPATIENT
Start: 2021-02-26 | End: 2021-03-03

## 2021-02-26 RX ORDER — IBUPROFEN 800 MG/1
800 TABLET ORAL EVERY 8 HOURS
Qty: 24 TABLET | Refills: 0 | Status: SHIPPED | OUTPATIENT
Start: 2021-02-26 | End: 2021-03-06

## 2021-02-26 RX ORDER — ONDANSETRON 8 MG/1
8 TABLET, ORALLY DISINTEGRATING ORAL EVERY 8 HOURS PRN
Qty: 14 TABLET | Refills: 1 | Status: SHIPPED | OUTPATIENT
Start: 2021-02-26 | End: 2021-07-30

## 2021-02-26 RX ADMIN — IBUPROFEN 800 MG: 800 TABLET, FILM COATED ORAL at 07:02

## 2021-02-26 RX ADMIN — DOCUSATE SODIUM 200 MG: 100 CAPSULE, LIQUID FILLED ORAL at 08:02

## 2021-02-26 RX ADMIN — IBUPROFEN 800 MG: 800 TABLET, FILM COATED ORAL at 02:02

## 2021-02-26 RX ADMIN — OXYCODONE HYDROCHLORIDE AND ACETAMINOPHEN 1 TABLET: 5; 325 TABLET ORAL at 08:02

## 2021-02-26 RX ADMIN — OXYCODONE HYDROCHLORIDE AND ACETAMINOPHEN 1 TABLET: 5; 325 TABLET ORAL at 03:02

## 2021-02-26 RX ADMIN — ONDANSETRON 8 MG: 4 TABLET, ORALLY DISINTEGRATING ORAL at 02:02

## 2021-03-02 ENCOUNTER — TELEPHONE (OUTPATIENT)
Dept: OBSTETRICS AND GYNECOLOGY | Facility: CLINIC | Age: 26
End: 2021-03-02

## 2021-03-03 ENCOUNTER — PATIENT MESSAGE (OUTPATIENT)
Dept: OBSTETRICS AND GYNECOLOGY | Facility: CLINIC | Age: 26
End: 2021-03-03

## 2021-03-03 ENCOUNTER — POSTPARTUM VISIT (OUTPATIENT)
Dept: OBSTETRICS AND GYNECOLOGY | Facility: CLINIC | Age: 26
End: 2021-03-03
Payer: MEDICAID

## 2021-03-03 VITALS
WEIGHT: 268.81 LBS | HEART RATE: 122 BPM | SYSTOLIC BLOOD PRESSURE: 124 MMHG | BODY MASS INDEX: 43.2 KG/M2 | DIASTOLIC BLOOD PRESSURE: 76 MMHG | RESPIRATION RATE: 14 BRPM | HEIGHT: 66 IN

## 2021-03-03 PROCEDURE — 99999 PR PBB SHADOW E&M-EST. PATIENT-LVL III: ICD-10-PCS | Mod: PBBFAC,,, | Performed by: OBSTETRICS & GYNECOLOGY

## 2021-03-03 PROCEDURE — 99213 OFFICE O/P EST LOW 20 MIN: CPT | Mod: PBBFAC | Performed by: OBSTETRICS & GYNECOLOGY

## 2021-03-03 PROCEDURE — 99999 PR PBB SHADOW E&M-EST. PATIENT-LVL III: CPT | Mod: PBBFAC,,, | Performed by: OBSTETRICS & GYNECOLOGY

## 2021-03-03 PROCEDURE — 59430 PR CARE AFTER DELIVERY ONLY: ICD-10-PCS | Mod: ,,, | Performed by: OBSTETRICS & GYNECOLOGY

## 2021-03-03 RX ORDER — FAMOTIDINE 20 MG/1
20 TABLET, FILM COATED ORAL 2 TIMES DAILY
COMMUNITY
Start: 2021-02-17 | End: 2022-10-04

## 2021-03-04 ENCOUNTER — TELEPHONE (OUTPATIENT)
Dept: OBSTETRICS AND GYNECOLOGY | Facility: CLINIC | Age: 26
End: 2021-03-04

## 2021-03-25 ENCOUNTER — TELEPHONE (OUTPATIENT)
Dept: OBSTETRICS AND GYNECOLOGY | Facility: CLINIC | Age: 26
End: 2021-03-25

## 2021-03-31 ENCOUNTER — POSTPARTUM VISIT (OUTPATIENT)
Dept: OBSTETRICS AND GYNECOLOGY | Facility: CLINIC | Age: 26
End: 2021-03-31
Payer: MEDICAID

## 2021-03-31 VITALS
HEART RATE: 109 BPM | DIASTOLIC BLOOD PRESSURE: 74 MMHG | SYSTOLIC BLOOD PRESSURE: 116 MMHG | BODY MASS INDEX: 41.89 KG/M2 | WEIGHT: 260.63 LBS | HEIGHT: 66 IN | RESPIRATION RATE: 15 BRPM

## 2021-03-31 DIAGNOSIS — Z30.42 ENCOUNTER FOR DEPO-PROVERA CONTRACEPTION: ICD-10-CM

## 2021-03-31 LAB
B-HCG UR QL: NEGATIVE
CTP QC/QA: YES

## 2021-03-31 PROCEDURE — 81025 URINE PREGNANCY TEST: CPT | Mod: PBBFAC | Performed by: OBSTETRICS & GYNECOLOGY

## 2021-03-31 PROCEDURE — 99999 PR PBB SHADOW E&M-EST. PATIENT-LVL III: ICD-10-PCS | Mod: PBBFAC,,, | Performed by: OBSTETRICS & GYNECOLOGY

## 2021-03-31 PROCEDURE — 99213 OFFICE O/P EST LOW 20 MIN: CPT | Mod: PBBFAC,TH | Performed by: OBSTETRICS & GYNECOLOGY

## 2021-03-31 PROCEDURE — 0503F POSTPARTUM CARE VISIT: CPT | Mod: ,,, | Performed by: OBSTETRICS & GYNECOLOGY

## 2021-03-31 PROCEDURE — 0503F PR POSTPARTUM CARE VISIT: ICD-10-PCS | Mod: ,,, | Performed by: OBSTETRICS & GYNECOLOGY

## 2021-03-31 PROCEDURE — 99999 PR PBB SHADOW E&M-EST. PATIENT-LVL III: CPT | Mod: PBBFAC,,, | Performed by: OBSTETRICS & GYNECOLOGY

## 2021-03-31 RX ORDER — MEDROXYPROGESTERONE ACETATE 150 MG/ML
150 INJECTION, SUSPENSION INTRAMUSCULAR
Qty: 1 ML | Refills: 3 | Status: SHIPPED | OUTPATIENT
Start: 2021-03-31 | End: 2021-07-30 | Stop reason: SDUPTHER

## 2021-04-08 ENCOUNTER — TELEPHONE (OUTPATIENT)
Dept: OBSTETRICS AND GYNECOLOGY | Facility: CLINIC | Age: 26
End: 2021-04-08

## 2021-04-27 ENCOUNTER — PATIENT MESSAGE (OUTPATIENT)
Dept: OBSTETRICS AND GYNECOLOGY | Facility: CLINIC | Age: 26
End: 2021-04-27

## 2021-07-30 ENCOUNTER — OFFICE VISIT (OUTPATIENT)
Dept: OBSTETRICS AND GYNECOLOGY | Facility: CLINIC | Age: 26
End: 2021-07-30
Payer: MEDICAID

## 2021-07-30 VITALS
BODY MASS INDEX: 43.07 KG/M2 | DIASTOLIC BLOOD PRESSURE: 76 MMHG | RESPIRATION RATE: 14 BRPM | HEIGHT: 66 IN | HEART RATE: 111 BPM | SYSTOLIC BLOOD PRESSURE: 118 MMHG | WEIGHT: 268 LBS

## 2021-07-30 DIAGNOSIS — Z30.42 DEPO-PROVERA CONTRACEPTIVE STATUS: ICD-10-CM

## 2021-07-30 DIAGNOSIS — Z01.419 WELL WOMAN EXAM WITH ROUTINE GYNECOLOGICAL EXAM: Primary | ICD-10-CM

## 2021-07-30 DIAGNOSIS — Z30.42 ENCOUNTER FOR DEPO-PROVERA CONTRACEPTION: ICD-10-CM

## 2021-07-30 PROCEDURE — 99999 PR PBB SHADOW E&M-EST. PATIENT-LVL III: ICD-10-PCS | Mod: PBBFAC,,, | Performed by: OBSTETRICS & GYNECOLOGY

## 2021-07-30 PROCEDURE — 99395 PREV VISIT EST AGE 18-39: CPT | Mod: S$PBB,,, | Performed by: OBSTETRICS & GYNECOLOGY

## 2021-07-30 PROCEDURE — 99395 PR PREVENTIVE VISIT,EST,18-39: ICD-10-PCS | Mod: S$PBB,,, | Performed by: OBSTETRICS & GYNECOLOGY

## 2021-07-30 PROCEDURE — 99213 OFFICE O/P EST LOW 20 MIN: CPT | Mod: PBBFAC | Performed by: OBSTETRICS & GYNECOLOGY

## 2021-07-30 PROCEDURE — 99999 PR PBB SHADOW E&M-EST. PATIENT-LVL III: CPT | Mod: PBBFAC,,, | Performed by: OBSTETRICS & GYNECOLOGY

## 2021-07-30 RX ORDER — MEDROXYPROGESTERONE ACETATE 150 MG/ML
150 INJECTION, SUSPENSION INTRAMUSCULAR
Qty: 1 ML | Refills: 3 | Status: SHIPPED | OUTPATIENT
Start: 2021-07-30 | End: 2022-05-05 | Stop reason: SDUPTHER

## 2021-08-22 ENCOUNTER — PATIENT MESSAGE (OUTPATIENT)
Dept: OBSTETRICS AND GYNECOLOGY | Facility: CLINIC | Age: 26
End: 2021-08-22

## 2021-09-13 ENCOUNTER — TELEPHONE (OUTPATIENT)
Dept: OBSTETRICS AND GYNECOLOGY | Facility: CLINIC | Age: 26
End: 2021-09-13

## 2021-10-01 PROBLEM — Z04.89 ENCOUNTER FOR EXAMINATION AND OBSERVATION FOR OTHER SPECIFIED REASONS: Status: RESOLVED | Noted: 2020-10-26 | Resolved: 2021-02-24

## 2022-03-28 ENCOUNTER — HOSPITAL ENCOUNTER (INPATIENT)
Facility: HOSPITAL | Age: 27
LOS: 2 days | Discharge: HOME | DRG: 309 | End: 2022-03-30
Attending: FAMILY MEDICINE | Admitting: INTERNAL MEDICINE
Payer: MEDICAID

## 2022-03-28 DIAGNOSIS — F41.9 ANXIETY: ICD-10-CM

## 2022-03-28 DIAGNOSIS — I48.91 ATRIAL FIBRILLATION: ICD-10-CM

## 2022-03-28 DIAGNOSIS — I48.91 ATRIAL FIBRILLATION WITH RVR: Primary | ICD-10-CM

## 2022-03-28 DIAGNOSIS — G47.33 OBSTRUCTIVE SLEEP APNEA: ICD-10-CM

## 2022-03-28 DIAGNOSIS — R00.0 TACHYCARDIA: ICD-10-CM

## 2022-03-28 DIAGNOSIS — Z20.822 COVID-19 VIRUS NOT DETECTED: ICD-10-CM

## 2022-03-28 LAB
ALBUMIN SERPL BCP-MCNC: 3.9 G/DL (ref 3.5–5.2)
ALP SERPL-CCNC: 100 U/L (ref 55–135)
ALT SERPL W/O P-5'-P-CCNC: 15 U/L (ref 10–44)
AMPHET+METHAMPHET UR QL: NEGATIVE
ANION GAP SERPL CALC-SCNC: 12 MMOL/L (ref 8–16)
APTT BLDCRRT: 26.5 SEC (ref 21–32)
AST SERPL-CCNC: 14 U/L (ref 10–40)
B-HCG UR QL: NEGATIVE
BARBITURATES UR QL SCN>200 NG/ML: NEGATIVE
BASOPHILS # BLD AUTO: 0.08 K/UL (ref 0–0.2)
BASOPHILS NFR BLD: 0.6 % (ref 0–1.9)
BENZODIAZ UR QL SCN>200 NG/ML: NEGATIVE
BILIRUB SERPL-MCNC: 0.1 MG/DL (ref 0.1–1)
BILIRUB UR QL STRIP: NEGATIVE
BNP SERPL-MCNC: 28 PG/ML (ref 0–99)
BUN SERPL-MCNC: 15 MG/DL (ref 6–20)
BZE UR QL SCN: NEGATIVE
CALCIUM SERPL-MCNC: 10.1 MG/DL (ref 8.7–10.5)
CANNABINOIDS UR QL SCN: NEGATIVE
CHLORIDE SERPL-SCNC: 107 MMOL/L (ref 95–110)
CLARITY UR: ABNORMAL
CO2 SERPL-SCNC: 24 MMOL/L (ref 23–29)
COLOR UR: YELLOW
CREAT SERPL-MCNC: 0.8 MG/DL (ref 0.5–1.4)
CREAT UR-MCNC: 74.7 MG/DL (ref 15–325)
D DIMER PPP IA.FEU-MCNC: 0.44 MG/L FEU
DIFFERENTIAL METHOD: ABNORMAL
EOSINOPHIL # BLD AUTO: 0.6 K/UL (ref 0–0.5)
EOSINOPHIL NFR BLD: 5.1 % (ref 0–8)
ERYTHROCYTE [DISTWIDTH] IN BLOOD BY AUTOMATED COUNT: 13.4 % (ref 11.5–14.5)
EST. GFR  (AFRICAN AMERICAN): >60 ML/MIN/1.73 M^2
EST. GFR  (NON AFRICAN AMERICAN): >60 ML/MIN/1.73 M^2
GLUCOSE SERPL-MCNC: 95 MG/DL (ref 70–110)
GLUCOSE UR QL STRIP: NEGATIVE
HCT VFR BLD AUTO: 47.1 % (ref 37–48.5)
HGB BLD-MCNC: 14.9 G/DL (ref 12–16)
HGB UR QL STRIP: NEGATIVE
IMM GRANULOCYTES # BLD AUTO: 0.03 K/UL (ref 0–0.04)
IMM GRANULOCYTES NFR BLD AUTO: 0.2 % (ref 0–0.5)
INR PPP: 1 (ref 0.8–1.2)
KETONES UR QL STRIP: NEGATIVE
LEUKOCYTE ESTERASE UR QL STRIP: NEGATIVE
LYMPHOCYTES # BLD AUTO: 5.1 K/UL (ref 1–4.8)
LYMPHOCYTES NFR BLD: 40.7 % (ref 18–48)
MCH RBC QN AUTO: 27.7 PG (ref 27–31)
MCHC RBC AUTO-ENTMCNC: 31.6 G/DL (ref 32–36)
MCV RBC AUTO: 88 FL (ref 82–98)
METHADONE UR QL SCN>300 NG/ML: NEGATIVE
MONOCYTES # BLD AUTO: 0.9 K/UL (ref 0.3–1)
MONOCYTES NFR BLD: 7.3 % (ref 4–15)
NEUTROPHILS # BLD AUTO: 5.8 K/UL (ref 1.8–7.7)
NEUTROPHILS NFR BLD: 46.1 % (ref 38–73)
NITRITE UR QL STRIP: NEGATIVE
NRBC BLD-RTO: 0 /100 WBC
OPIATES UR QL SCN: NEGATIVE
PCP UR QL SCN>25 NG/ML: NEGATIVE
PH UR STRIP: 7 [PH] (ref 5–8)
PLATELET # BLD AUTO: 341 K/UL (ref 150–450)
PMV BLD AUTO: 10.8 FL (ref 9.2–12.9)
POTASSIUM SERPL-SCNC: 3.9 MMOL/L (ref 3.5–5.1)
PROT SERPL-MCNC: 8.3 G/DL (ref 6–8.4)
PROT UR QL STRIP: NEGATIVE
PROTHROMBIN TIME: 10.1 SEC (ref 9–12.5)
RBC # BLD AUTO: 5.38 M/UL (ref 4–5.4)
SARS-COV-2 RDRP RESP QL NAA+PROBE: NEGATIVE
SODIUM SERPL-SCNC: 143 MMOL/L (ref 136–145)
SP GR UR STRIP: 1.02 (ref 1–1.03)
TOXICOLOGY INFORMATION: NORMAL
TROPONIN I SERPL DL<=0.01 NG/ML-MCNC: <0.006 NG/ML (ref 0–0.03)
TSH SERPL DL<=0.005 MIU/L-ACNC: 3.87 UIU/ML (ref 0.4–4)
URN SPEC COLLECT METH UR: ABNORMAL
UROBILINOGEN UR STRIP-ACNC: NEGATIVE EU/DL
WBC # BLD AUTO: 12.51 K/UL (ref 3.9–12.7)

## 2022-03-28 PROCEDURE — 96372 THER/PROPH/DIAG INJ SC/IM: CPT | Performed by: FAMILY MEDICINE

## 2022-03-28 PROCEDURE — 20000000 HC ICU ROOM

## 2022-03-28 PROCEDURE — U0002 COVID-19 LAB TEST NON-CDC: HCPCS | Performed by: FAMILY MEDICINE

## 2022-03-28 PROCEDURE — 36415 COLL VENOUS BLD VENIPUNCTURE: CPT | Performed by: FAMILY MEDICINE

## 2022-03-28 PROCEDURE — 93010 ELECTROCARDIOGRAM REPORT: CPT | Mod: ,,, | Performed by: INTERNAL MEDICINE

## 2022-03-28 PROCEDURE — 81025 URINE PREGNANCY TEST: CPT | Performed by: FAMILY MEDICINE

## 2022-03-28 PROCEDURE — 85025 COMPLETE CBC W/AUTO DIFF WBC: CPT | Performed by: FAMILY MEDICINE

## 2022-03-28 PROCEDURE — 80307 DRUG TEST PRSMV CHEM ANLYZR: CPT | Performed by: FAMILY MEDICINE

## 2022-03-28 PROCEDURE — 85379 FIBRIN DEGRADATION QUANT: CPT | Performed by: FAMILY MEDICINE

## 2022-03-28 PROCEDURE — 96361 HYDRATE IV INFUSION ADD-ON: CPT

## 2022-03-28 PROCEDURE — 93005 ELECTROCARDIOGRAM TRACING: CPT

## 2022-03-28 PROCEDURE — 85730 THROMBOPLASTIN TIME PARTIAL: CPT | Performed by: FAMILY MEDICINE

## 2022-03-28 PROCEDURE — 96376 TX/PRO/DX INJ SAME DRUG ADON: CPT

## 2022-03-28 PROCEDURE — 81003 URINALYSIS AUTO W/O SCOPE: CPT | Mod: 59 | Performed by: FAMILY MEDICINE

## 2022-03-28 PROCEDURE — 25000003 PHARM REV CODE 250: Performed by: FAMILY MEDICINE

## 2022-03-28 PROCEDURE — 85610 PROTHROMBIN TIME: CPT | Performed by: FAMILY MEDICINE

## 2022-03-28 PROCEDURE — 96374 THER/PROPH/DIAG INJ IV PUSH: CPT

## 2022-03-28 PROCEDURE — 63600175 PHARM REV CODE 636 W HCPCS: Performed by: FAMILY MEDICINE

## 2022-03-28 PROCEDURE — 99291 CRITICAL CARE FIRST HOUR: CPT | Mod: 25

## 2022-03-28 PROCEDURE — 84484 ASSAY OF TROPONIN QUANT: CPT | Performed by: FAMILY MEDICINE

## 2022-03-28 PROCEDURE — 84443 ASSAY THYROID STIM HORMONE: CPT | Performed by: FAMILY MEDICINE

## 2022-03-28 PROCEDURE — 80053 COMPREHEN METABOLIC PANEL: CPT | Performed by: FAMILY MEDICINE

## 2022-03-28 PROCEDURE — 93010 EKG 12-LEAD: ICD-10-PCS | Mod: ,,, | Performed by: INTERNAL MEDICINE

## 2022-03-28 PROCEDURE — 83880 ASSAY OF NATRIURETIC PEPTIDE: CPT | Performed by: FAMILY MEDICINE

## 2022-03-28 RX ORDER — LORAZEPAM 2 MG/ML
2 INJECTION INTRAMUSCULAR
Status: COMPLETED | OUTPATIENT
Start: 2022-03-28 | End: 2022-03-28

## 2022-03-28 RX ORDER — DILTIAZEM HCL 1 MG/ML
5 INJECTION, SOLUTION INTRAVENOUS
Status: COMPLETED | OUTPATIENT
Start: 2022-03-28 | End: 2022-03-28

## 2022-03-28 RX ORDER — SODIUM CHLORIDE 9 MG/ML
1000 INJECTION, SOLUTION INTRAVENOUS
Status: COMPLETED | OUTPATIENT
Start: 2022-03-28 | End: 2022-03-28

## 2022-03-28 RX ORDER — DILTIAZEM HYDROCHLORIDE 5 MG/ML
10 INJECTION INTRAVENOUS
Status: COMPLETED | OUTPATIENT
Start: 2022-03-28 | End: 2022-03-28

## 2022-03-28 RX ORDER — ENOXAPARIN SODIUM 150 MG/ML
1 INJECTION SUBCUTANEOUS
Status: COMPLETED | OUTPATIENT
Start: 2022-03-28 | End: 2022-03-28

## 2022-03-28 RX ORDER — DILTIAZEM HYDROCHLORIDE 5 MG/ML
20 INJECTION INTRAVENOUS
Status: COMPLETED | OUTPATIENT
Start: 2022-03-28 | End: 2022-03-28

## 2022-03-28 RX ADMIN — DILTIAZEM HYDROCHLORIDE 20 MG: 5 INJECTION INTRAVENOUS at 08:03

## 2022-03-28 RX ADMIN — SODIUM CHLORIDE 1000 ML: 0.9 INJECTION, SOLUTION INTRAVENOUS at 08:03

## 2022-03-28 RX ADMIN — DILTIAZEM HYDROCHLORIDE 10 MG: 5 INJECTION INTRAVENOUS at 09:03

## 2022-03-28 RX ADMIN — ENOXAPARIN SODIUM 120 MG: 120 INJECTION, SOLUTION INTRAVENOUS; SUBCUTANEOUS at 09:03

## 2022-03-28 RX ADMIN — DILTIAZEM HYDROCHLORIDE 5 MG/HR: 5 INJECTION INTRAVENOUS at 09:03

## 2022-03-28 RX ADMIN — SODIUM CHLORIDE 1000 ML: 0.9 INJECTION, SOLUTION INTRAVENOUS at 10:03

## 2022-03-28 RX ADMIN — LORAZEPAM 2 MG: 2 INJECTION INTRAMUSCULAR; INTRAVENOUS at 10:03

## 2022-03-29 PROBLEM — I48.91 ATRIAL FIBRILLATION WITH RVR: Status: ACTIVE | Noted: 2022-03-29

## 2022-03-29 PROBLEM — F41.9 ANXIETY: Status: ACTIVE | Noted: 2022-03-29

## 2022-03-29 LAB
ALBUMIN SERPL BCP-MCNC: 3.4 G/DL (ref 3.5–5.2)
ALP SERPL-CCNC: 84 U/L (ref 55–135)
ALT SERPL W/O P-5'-P-CCNC: 14 U/L (ref 10–44)
ANION GAP SERPL CALC-SCNC: 9 MMOL/L (ref 8–16)
AORTIC ROOT ANNULUS: 2.81 CM
AST SERPL-CCNC: 10 U/L (ref 10–40)
AV INDEX (PROSTH): 0.6
AV MEAN GRADIENT: 3 MMHG
AV PEAK GRADIENT: 5 MMHG
AV VALVE AREA: 2.39 CM2
AV VELOCITY RATIO: 0.7
BASOPHILS # BLD AUTO: 0.07 K/UL (ref 0–0.2)
BASOPHILS NFR BLD: 0.7 % (ref 0–1.9)
BILIRUB SERPL-MCNC: 0.2 MG/DL (ref 0.1–1)
BSA FOR ECHO PROCEDURE: 2.42 M2
BUN SERPL-MCNC: 11 MG/DL (ref 6–20)
CALCIUM SERPL-MCNC: 8.7 MG/DL (ref 8.7–10.5)
CHLORIDE SERPL-SCNC: 109 MMOL/L (ref 95–110)
CO2 SERPL-SCNC: 21 MMOL/L (ref 23–29)
CREAT SERPL-MCNC: 0.7 MG/DL (ref 0.5–1.4)
CV ECHO LV RWT: 0.44 CM
D DIMER PPP IA.FEU-MCNC: 0.31 MG/L FEU
DIFFERENTIAL METHOD: ABNORMAL
DOP CALC AO PEAK VEL: 1.07 M/S
DOP CALC AO VTI: 21.56 CM
DOP CALC LVOT AREA: 4 CM2
DOP CALC LVOT DIAMETER: 2.25 CM
DOP CALC LVOT PEAK VEL: 0.75 M/S
DOP CALC LVOT STROKE VOLUME: 51.62 CM3
DOP CALC RVOT PEAK VEL: 0.49 M/S
DOP CALC RVOT VTI: 8.81 CM
DOP CALCLVOT PEAK VEL VTI: 12.99 CM
E WAVE DECELERATION TIME: 217.01 MSEC
E/A RATIO: 2.97
ECHO LV POSTERIOR WALL: 1.01 CM (ref 0.6–1.1)
EJECTION FRACTION: 55 %
EOSINOPHIL # BLD AUTO: 0.7 K/UL (ref 0–0.5)
EOSINOPHIL NFR BLD: 6.4 % (ref 0–8)
ERYTHROCYTE [DISTWIDTH] IN BLOOD BY AUTOMATED COUNT: 13.4 % (ref 11.5–14.5)
EST. GFR  (AFRICAN AMERICAN): >60 ML/MIN/1.73 M^2
EST. GFR  (NON AFRICAN AMERICAN): >60 ML/MIN/1.73 M^2
FRACTIONAL SHORTENING: 32 % (ref 28–44)
GLUCOSE SERPL-MCNC: 103 MG/DL (ref 70–110)
HCT VFR BLD AUTO: 43.7 % (ref 37–48.5)
HGB BLD-MCNC: 14.1 G/DL (ref 12–16)
IMM GRANULOCYTES # BLD AUTO: 0.02 K/UL (ref 0–0.04)
IMM GRANULOCYTES NFR BLD AUTO: 0.2 % (ref 0–0.5)
INTERVENTRICULAR SEPTUM: 0.99 CM (ref 0.6–1.1)
IVRT: 138.41 MSEC
LA MAJOR: 5.09 CM
LA MINOR: 4.78 CM
LA WIDTH: 3.57 CM
LEFT ATRIUM SIZE: 3.33 CM
LEFT ATRIUM VOLUME INDEX: 21.2 ML/M2
LEFT ATRIUM VOLUME: 49.82 CM3
LEFT INTERNAL DIMENSION IN SYSTOLE: 3.13 CM (ref 2.1–4)
LEFT VENTRICLE DIASTOLIC VOLUME INDEX: 41.05 ML/M2
LEFT VENTRICLE DIASTOLIC VOLUME: 96.46 ML
LEFT VENTRICLE MASS INDEX: 67 G/M2
LEFT VENTRICLE SYSTOLIC VOLUME INDEX: 16.5 ML/M2
LEFT VENTRICLE SYSTOLIC VOLUME: 38.8 ML
LEFT VENTRICULAR INTERNAL DIMENSION IN DIASTOLE: 4.58 CM (ref 3.5–6)
LEFT VENTRICULAR MASS: 157.7 G
LYMPHOCYTES # BLD AUTO: 4.3 K/UL (ref 1–4.8)
LYMPHOCYTES NFR BLD: 41.3 % (ref 18–48)
MAGNESIUM SERPL-MCNC: 1.8 MG/DL (ref 1.6–2.6)
MCH RBC QN AUTO: 27.6 PG (ref 27–31)
MCHC RBC AUTO-ENTMCNC: 32.3 G/DL (ref 32–36)
MCV RBC AUTO: 86 FL (ref 82–98)
MONOCYTES # BLD AUTO: 0.7 K/UL (ref 0.3–1)
MONOCYTES NFR BLD: 6.7 % (ref 4–15)
MV PEAK A VEL: 0.38 M/S
MV PEAK E VEL: 1.13 M/S
MV STENOSIS PRESSURE HALF TIME: 62.93 MS
MV VALVE AREA P 1/2 METHOD: 3.5 CM2
NEUTROPHILS # BLD AUTO: 4.6 K/UL (ref 1.8–7.7)
NEUTROPHILS NFR BLD: 44.7 % (ref 38–73)
NRBC BLD-RTO: 0 /100 WBC
PISA TR MAX VEL: 2.11 M/S
PLATELET # BLD AUTO: 327 K/UL (ref 150–450)
PMV BLD AUTO: 10.4 FL (ref 9.2–12.9)
POTASSIUM SERPL-SCNC: 3.9 MMOL/L (ref 3.5–5.1)
PROT SERPL-MCNC: 6.9 G/DL (ref 6–8.4)
PV MEAN GRADIENT: 0.61 MMHG
PV PEAK GRADIENT: 0.96 MMHG
PV PEAK VELOCITY: 0.63 CM/S
RA MAJOR: 3.91 CM
RBC # BLD AUTO: 5.1 M/UL (ref 4–5.4)
RIGHT VENTRICULAR END-DIASTOLIC DIMENSION: 2.7 CM
SODIUM SERPL-SCNC: 139 MMOL/L (ref 136–145)
STJ: 2.91 CM
TR MAX PG: 18 MMHG
TROPONIN I SERPL DL<=0.01 NG/ML-MCNC: 0.01 NG/ML (ref 0–0.03)
TROPONIN I SERPL DL<=0.01 NG/ML-MCNC: <0.006 NG/ML (ref 0–0.03)
WBC # BLD AUTO: 10.29 K/UL (ref 3.9–12.7)

## 2022-03-29 PROCEDURE — 80053 COMPREHEN METABOLIC PANEL: CPT | Performed by: FAMILY MEDICINE

## 2022-03-29 PROCEDURE — 83735 ASSAY OF MAGNESIUM: CPT | Performed by: INTERNAL MEDICINE

## 2022-03-29 PROCEDURE — 36415 COLL VENOUS BLD VENIPUNCTURE: CPT | Performed by: INTERNAL MEDICINE

## 2022-03-29 PROCEDURE — 63600175 PHARM REV CODE 636 W HCPCS: Performed by: NURSE PRACTITIONER

## 2022-03-29 PROCEDURE — 85025 COMPLETE CBC W/AUTO DIFF WBC: CPT | Performed by: FAMILY MEDICINE

## 2022-03-29 PROCEDURE — 25000003 PHARM REV CODE 250: Performed by: INTERNAL MEDICINE

## 2022-03-29 PROCEDURE — 63600175 PHARM REV CODE 636 W HCPCS: Performed by: FAMILY MEDICINE

## 2022-03-29 PROCEDURE — 25000003 PHARM REV CODE 250: Performed by: NURSE PRACTITIONER

## 2022-03-29 PROCEDURE — 36415 COLL VENOUS BLD VENIPUNCTURE: CPT | Performed by: FAMILY MEDICINE

## 2022-03-29 PROCEDURE — 99222 PR INITIAL HOSPITAL CARE,LEVL II: ICD-10-PCS | Mod: ,,, | Performed by: INTERNAL MEDICINE

## 2022-03-29 PROCEDURE — 99222 1ST HOSP IP/OBS MODERATE 55: CPT | Mod: ,,, | Performed by: INTERNAL MEDICINE

## 2022-03-29 PROCEDURE — 85379 FIBRIN DEGRADATION QUANT: CPT | Performed by: FAMILY MEDICINE

## 2022-03-29 PROCEDURE — 20000000 HC ICU ROOM

## 2022-03-29 PROCEDURE — 84484 ASSAY OF TROPONIN QUANT: CPT | Mod: 91 | Performed by: FAMILY MEDICINE

## 2022-03-29 PROCEDURE — 25000003 PHARM REV CODE 250: Performed by: FAMILY MEDICINE

## 2022-03-29 RX ORDER — POTASSIUM CHLORIDE 20 MEQ/1
40 TABLET, EXTENDED RELEASE ORAL ONCE
Status: COMPLETED | OUTPATIENT
Start: 2022-03-29 | End: 2022-03-29

## 2022-03-29 RX ORDER — DILTIAZEM HCL/D5W 125 MG/125
10 PLASTIC BAG, INJECTION (ML) INTRAVENOUS CONTINUOUS
Status: DISCONTINUED | OUTPATIENT
Start: 2022-03-29 | End: 2022-03-30 | Stop reason: HOSPADM

## 2022-03-29 RX ORDER — FAMOTIDINE 10 MG/ML
20 INJECTION INTRAVENOUS EVERY 12 HOURS
Status: DISCONTINUED | OUTPATIENT
Start: 2022-03-29 | End: 2022-03-30 | Stop reason: HOSPADM

## 2022-03-29 RX ORDER — MAGNESIUM SULFATE 1 G/100ML
1 INJECTION INTRAVENOUS ONCE
Status: COMPLETED | OUTPATIENT
Start: 2022-03-29 | End: 2022-03-29

## 2022-03-29 RX ORDER — SODIUM CHLORIDE 0.9 % (FLUSH) 0.9 %
10 SYRINGE (ML) INJECTION
Status: DISCONTINUED | OUTPATIENT
Start: 2022-03-29 | End: 2022-03-30 | Stop reason: HOSPADM

## 2022-03-29 RX ORDER — MUPIROCIN 20 MG/G
OINTMENT TOPICAL 2 TIMES DAILY
Status: DISCONTINUED | OUTPATIENT
Start: 2022-03-29 | End: 2022-03-30 | Stop reason: HOSPADM

## 2022-03-29 RX ORDER — ENOXAPARIN SODIUM 150 MG/ML
1 INJECTION SUBCUTANEOUS
Status: DISCONTINUED | OUTPATIENT
Start: 2022-03-29 | End: 2022-03-30 | Stop reason: HOSPADM

## 2022-03-29 RX ORDER — METOPROLOL TARTRATE 50 MG/1
50 TABLET ORAL 4 TIMES DAILY
Status: DISCONTINUED | OUTPATIENT
Start: 2022-03-29 | End: 2022-03-30

## 2022-03-29 RX ORDER — ONDANSETRON 2 MG/ML
4 INJECTION INTRAMUSCULAR; INTRAVENOUS EVERY 8 HOURS PRN
Status: DISCONTINUED | OUTPATIENT
Start: 2022-03-29 | End: 2022-03-30 | Stop reason: HOSPADM

## 2022-03-29 RX ORDER — FLECAINIDE ACETATE 50 MG/1
50 TABLET ORAL EVERY 12 HOURS
Status: DISCONTINUED | OUTPATIENT
Start: 2022-03-29 | End: 2022-03-30 | Stop reason: HOSPADM

## 2022-03-29 RX ORDER — ALPRAZOLAM 0.25 MG/1
0.25 TABLET ORAL 3 TIMES DAILY PRN
Status: DISCONTINUED | OUTPATIENT
Start: 2022-03-29 | End: 2022-03-30 | Stop reason: HOSPADM

## 2022-03-29 RX ORDER — SERTRALINE HYDROCHLORIDE 25 MG/1
25 TABLET, FILM COATED ORAL DAILY
Status: DISCONTINUED | OUTPATIENT
Start: 2022-03-29 | End: 2022-03-30 | Stop reason: HOSPADM

## 2022-03-29 RX ORDER — MAGNESIUM SULFATE HEPTAHYDRATE 40 MG/ML
2 INJECTION, SOLUTION INTRAVENOUS ONCE
Status: COMPLETED | OUTPATIENT
Start: 2022-03-29 | End: 2022-03-29

## 2022-03-29 RX ADMIN — FAMOTIDINE 20 MG: 10 INJECTION, SOLUTION INTRAVENOUS at 09:03

## 2022-03-29 RX ADMIN — FLECAINIDE ACETATE 50 MG: 50 TABLET ORAL at 09:03

## 2022-03-29 RX ADMIN — FAMOTIDINE 20 MG: 10 INJECTION, SOLUTION INTRAVENOUS at 08:03

## 2022-03-29 RX ADMIN — ONDANSETRON HYDROCHLORIDE 4 MG: 2 SOLUTION INTRAMUSCULAR; INTRAVENOUS at 08:03

## 2022-03-29 RX ADMIN — ONDANSETRON HYDROCHLORIDE 4 MG: 2 SOLUTION INTRAMUSCULAR; INTRAVENOUS at 09:03

## 2022-03-29 RX ADMIN — MUPIROCIN: 20 OINTMENT TOPICAL at 09:03

## 2022-03-29 RX ADMIN — POTASSIUM CHLORIDE 40 MEQ: 1500 TABLET, EXTENDED RELEASE ORAL at 08:03

## 2022-03-29 RX ADMIN — MUPIROCIN: 20 OINTMENT TOPICAL at 08:03

## 2022-03-29 RX ADMIN — DILTIAZEM HYDROCHLORIDE 10 MG/HR: 5 INJECTION INTRAVENOUS at 08:03

## 2022-03-29 RX ADMIN — METOPROLOL TARTRATE 50 MG: 50 TABLET, FILM COATED ORAL at 09:03

## 2022-03-29 RX ADMIN — ENOXAPARIN SODIUM 120 MG: 120 INJECTION, SOLUTION INTRAVENOUS; SUBCUTANEOUS at 09:03

## 2022-03-29 RX ADMIN — METOPROLOL TARTRATE 50 MG: 50 TABLET, FILM COATED ORAL at 04:03

## 2022-03-29 RX ADMIN — ENOXAPARIN SODIUM 120 MG: 120 INJECTION, SOLUTION INTRAVENOUS; SUBCUTANEOUS at 10:03

## 2022-03-29 RX ADMIN — MAGNESIUM SULFATE 1 G: 1 INJECTION INTRAVENOUS at 02:03

## 2022-03-29 RX ADMIN — ALPRAZOLAM 0.25 MG: 0.25 TABLET ORAL at 09:03

## 2022-03-29 RX ADMIN — METOPROLOL TARTRATE 50 MG: 50 TABLET, FILM COATED ORAL at 08:03

## 2022-03-29 RX ADMIN — MAGNESIUM SULFATE HEPTAHYDRATE 2 G: 2 INJECTION, SOLUTION INTRAVENOUS at 04:03

## 2022-03-29 RX ADMIN — METOPROLOL TARTRATE 50 MG: 50 TABLET, FILM COATED ORAL at 12:03

## 2022-03-29 RX ADMIN — SERTRALINE HYDROCHLORIDE 25 MG: 25 TABLET ORAL at 09:03

## 2022-03-29 NOTE — NURSING
"0659: Bed side report received from off going RN, care of patient assumed, safety cheek completed. Patient is alert, awake and oriented x 4, denies discomfort. On room air, no apparent distress noted, V/s per patient trend.   0736: Dr. Alonso at the bed side with patient and family.   0843: Dr. Rivera in round at this time.   0856: Scheduled medication given as per MAR. Patients family at the bed side, patient is anxious and crying at this time, reassurance provided.   1002: patient's father visiting at this time, patient is in a better mood than earlier.   1246: Sitting up on chair and eating lunch, no apparent distress noted.   1301: Patient lost PIV from left lower hand and left wrist, new PIV  placement has been atampted, patient is very agitated and start crying stating that  " My vain roles." placement attempt stopped, house supervisor Grace made aware of the situation. Per house sup she will be coming with ultra sound and try to establish access.   1336:20 gauge ultrasound guided PIV access established per enrico Edwards on the right antecubital, patient tolerated well.   1355: Assisted patient to bathroom, tolerated ambulation well.  1407: 20 gauge ultrasound guided PIV access established per enrico Edwards to the left hand. Patient tolerated well.   1546: Patient is complaining of burning sensation to the left hand PIV site at this time, PIV flushed and blood return noted. No  Swelling or redness noted. Educated patient that magnesium infusion tends to give a burning sensation at PIV site and will monitor it closely, verbalized understanding. Ice pack provided per patient request to sooth the sensation.   1824: Patient remain a-fib during shift, on continues 10 mg Diltiazem gtt per cardiology order; all scheduled medication given as per MAR, refer to MAR for detail. On room air with no apparent distress, spouse at the bed side. Shift report will be endorsed to oncoming RN.   1858: Shift report endorsed to " oncoming RN, care of patient relinquished.

## 2022-03-29 NOTE — SUBJECTIVE & OBJECTIVE
History reviewed. No pertinent past medical history.    Past Surgical History:   Procedure Laterality Date     SECTION N/A 2021    Procedure: PRIMARY  SECTION;  Surgeon: Mehran Fish MD;  Location: [facility] OR;  Service: OB/GYN;  Laterality: N/A;    COLONOSCOPY      ESOPHAGOSCOPY      TONSILLECTOMY, ADENOIDECTOMY, BILATERAL MYRINGOTOMY AND TUBES      WISDOM TOOTH EXTRACTION         Review of patient's allergies indicates:   Allergen Reactions    Allergenic extract-food-shrimp Edema    Pollen extracts      General allergic rhinitis        No current facility-administered medications on file prior to encounter.     Current Outpatient Medications on File Prior to Encounter   Medication Sig    docusate sodium (COLACE) 100 MG capsule Take 100 mg by mouth 2 (two) times daily.    famotidine (PEPCID) 20 MG tablet Take 20 mg by mouth 2 (two) times daily.    medroxyPROGESTERone (DEPO-PROVERA) 150 mg/mL injection Inject 1 mL (150 mg total) into the muscle every 3 (three) months.     Family History    None       Tobacco Use    Smoking status: Never Smoker    Smokeless tobacco: Never Used   Substance and Sexual Activity    Alcohol use: Not Currently     Comment: occ    Drug use: Never    Sexual activity: Yes     Partners: Male     Birth control/protection: Injection     Comment:      Review of Systems   Constitutional: Negative.   HENT: Negative.     Eyes: Negative.    Cardiovascular:  Positive for chest pain, irregular heartbeat and palpitations. Negative for claudication, cyanosis, dyspnea on exertion, leg swelling, near-syncope, orthopnea and paroxysmal nocturnal dyspnea.   Respiratory: Negative.     Endocrine: Negative.    Hematologic/Lymphatic: Negative.    Skin: Negative.    Musculoskeletal: Negative.    Gastrointestinal: Negative.    Genitourinary: Negative.    Neurological: Negative.    Psychiatric/Behavioral: Negative.     Allergic/Immunologic: Negative.    Objective:      Vital Signs (Most Recent):  Temp: 97.4 °F (36.3 °C) (03/29/22 0730)  Pulse: 79 (03/29/22 0601)  Resp: 19 (03/29/22 0601)  BP: (!) 117/95 (03/29/22 0601)  SpO2: 95 % (03/29/22 0601)   Vital Signs (24h Range):  Temp:  [96.7 °F (35.9 °C)-98.2 °F (36.8 °C)] 97.4 °F (36.3 °C)  Pulse:  [] 79  Resp:  [16-28] 19  SpO2:  [92 %-100 %] 95 %  BP: ()/(72-96) 117/95     Weight: 123.8 kg (273 lb)  Body mass index is 42.76 kg/m².    SpO2: 95 %  O2 Device (Oxygen Therapy): room air      Intake/Output Summary (Last 24 hours) at 3/29/2022 0822  Last data filed at 3/29/2022 0700  Gross per 24 hour   Intake 1000 ml   Output 1300 ml   Net -300 ml       Lines/Drains/Airways       Epidural Line  Duration                  Epidural Catheter 02/24/21 397 days              Peripheral Intravenous Line  Duration                  Peripheral IV - Single Lumen 03/28/22 2204 20 G Left Wrist <1 day         Peripheral IV - Single Lumen 03/29/22 0116 20 G Left Forearm <1 day                    Physical Exam  Constitutional:       General: She is not in acute distress.     Appearance: She is obese. She is not ill-appearing or toxic-appearing.   Cardiovascular:      Rate and Rhythm: Tachycardia present. Rhythm irregular.      Pulses: Normal pulses.      Heart sounds: Normal heart sounds. No murmur heard.    No friction rub. No gallop.   Pulmonary:      Effort: Pulmonary effort is normal. No respiratory distress.      Breath sounds: Normal breath sounds. No stridor. No wheezing, rhonchi or rales.   Chest:      Chest wall: No tenderness.   Abdominal:      General: Abdomen is flat. Bowel sounds are normal.      Palpations: Abdomen is soft.   Musculoskeletal:         General: Normal range of motion.      Cervical back: Normal range of motion and neck supple.      Right lower leg: No edema.      Left lower leg: No edema.   Skin:     General: Skin is warm and dry.      Capillary Refill: Capillary refill takes less than 2 seconds.    Neurological:      General: No focal deficit present.      Mental Status: She is alert and oriented to person, place, and time. Mental status is at baseline.   Psychiatric:         Mood and Affect: Mood normal.         Behavior: Behavior normal.         Thought Content: Thought content normal.         Judgment: Judgment normal.       Significant Labs:   Recent Lab Results  (Last 5 results in the past 24 hours)        03/29/22  0726   03/29/22  0723   03/29/22  0414   03/29/22  0146   03/28/22  2053        Albumin     3.4           Alkaline Phosphatase     84           ALT     14           Anion Gap     9           AST     10           Baso #       0.07         Basophil %       0.7         BILIRUBIN TOTAL     0.2  Comment: For infants and newborns, interpretation of results should be based  on gestational age, weight and in agreement with clinical  observations.    Premature Infant recommended reference ranges:  Up to 24 hours.............<8.0 mg/dL  Up to 48 hours............<12.0 mg/dL  3-5 days..................<15.0 mg/dL  6-29 days.................<15.0 mg/dL             BUN     11           Calcium     8.7           Chloride     109           CO2     21           Creatinine     0.7           D-Dimer 0.31  Comment: The quantitative D-dimer assay should be used as an aid in   the diagnosis of deep vein thrombosis and pulmonary embolism  in patients with the appropriate presentation and clinical  history. The upper limit of the reference interval and the clinical   cut off   point are identical. Causes of a positive (>0.50 mg/L FEU) D-Dimer   test  include, but are not limited to: DVT, PE, DIC, thrombolytic   therapy, anticoagulant therapy, recent surgery, trauma, or   pregnancy, disseminated malignancy, aortic aneurysm, cirrhosis,  and severe infection. False negative results may occur in   patients with distal DVT.  SHEREEN^612^^3^  LOT^610^DDiSup^[id number]\DDiBuf^[id number]\DDiReag^[id number]                 Differential  Method       Automated         eGFR if      >60           eGFR if non      >60  Comment: Calculation used to obtain the estimated glomerular filtration  rate (eGFR) is the CKD-EPI equation.              Eos #       0.7         Eosinophil %       6.4         Glucose     103           Gran # (ANC)       4.6         Gran %       44.7         Hematocrit       43.7         Hemoglobin       14.1         Immature Grans (Abs)       0.02  Comment: Mild elevation in immature granulocytes is non specific and   can be seen in a variety of conditions including stress response,   acute inflammation, trauma and pregnancy. Correlation with other   laboratory and clinical findings is essential.           Immature Granulocytes       0.2         Lymph #       4.3         Lymph %       41.3         MCH       27.6         MCHC       32.3         MCV       86         Mono #       0.7         Mono %       6.7         MPV       10.4         nRBC       0         Platelets       327         Potassium     3.9           PROTEIN TOTAL     6.9           RBC       5.10         RDW       13.4         SARS-CoV-2 RNA, Amplification, Qual         Negative  Comment: This test utilizes isothermal nucleic acid amplification   technology to detect the SARS-CoV-2 RdRp nucleic acid segment.   The analytical sensitivity (limit of detection) is 125 genome   equivalents/mL.     A POSITIVE result implies infection with the SARS-CoV-2 virus;  the patient is presumed to be contagious.    A NEGATIVE result means that SARS-CoV-2 nucleic acids are not  present above the limit of detection. A NEGATIVE result should be   treated as presumptive. It does not rule out the possibility of   COVID-19 and should not be the sole basis for treatment decisions.   If COVID-19 is strongly suspected based on clinical and exposure   history, re-testing using an alternate molecular assay should be   considered.       This test is only for use under  the Food and Drug   Administration s Emergency Use Authorization (EUA).   Commercial kits are provided by [employer].   Performance characteristics of the EUA have been independently  verified by [facility] Department of  Pathology and Laboratory Medicine.   _________________________________________________________________  The ID NOW COVID-19 Letter of Authorization, along with the   authorized Fact Sheet for Healthcare Providers, the authorized Fact  Sheet for Patients, and authorized labeling are available on the FDA   website:  www.fda.gov/MedicalDevices/Safety/EmergencySituations/arp303211.htm         Sodium     139           Troponin I   <0.006  Comment: The reference interval for Troponin I represents the 99th percentile   cutoff   for our facility and is consistent with 3rd generation assay   performance.       0.007  Comment: The reference interval for Troponin I represents the 99th percentile   cutoff   for our facility and is consistent with 3rd generation assay   performance.           WBC       10.29                                Significant Imaging: CT scan: CT ABDOMEN PELVIS WITH CONTRAST: No results found for this visit on 03/28/22. and CT ABDOMEN PELVIS WITHOUT CONTRAST: No results found for this visit on 03/28/22., Echocardiogram: 2D echo with color flow doppler: No results found for this or any previous visit. and Transthoracic echo (TTE) complete (Cupid Only): No results found for this or any previous visit., and X-Ray: CXR: X-Ray Chest 1 View (CXR): No results found for this visit on 03/28/22. and X-Ray Chest PA and Lateral (CXR): No results found for this visit on 03/28/22. and KUB: X-Ray Abdomen AP 1 View (KUB): No results found for this visit on 03/28/22.

## 2022-03-29 NOTE — NURSING
26 year old female patient received from ER @2320, came via stretcher. Report received from EDMUNDO Nava. Patient is awake, alert, and oriented x 4. Patient breathing freely on room air with no cardiopulmonary or respiratory distress noted. Patient admitted for Afib with RVR. No past medical history. Blood pressure was 132/87, , 02 97%, T 98.2, RR 16. Patient denies chest pain, SOB, dizziness or palpitation. Patient allergic to shrimp and pollen extracts. Patient received on Cardizem drip at the rate of 15 and IV fluid of NS running at 125 ml/hr. Peripheral line 20g to patient's left wrist and 20g to right AC (which was removed @0116 after patient complaint of pain to site and a new line was inserted to left AC 20G). IV line is patent and flowing freely with no s/s of infiltration or complication noted. Patient is able to ambulate to using the bedside commode independently. Safety measures maintained. Monitoring continues.

## 2022-03-29 NOTE — HPI
26 year old female with no significant medical history presents with c/o palpitations and nonexertional chest pain. Noted to be in new onset atrial fibrillation with RVR. [facility] consulted for management.

## 2022-03-29 NOTE — HPI
Patient presented to ER with 1 day h/o palpitations. She felt like her heart was racing. Caused pressure in center of chest. No sob, dizziness, syncope, diaphoresis. She has h/o gestational diabetes but no active DM, HTN, HLD, etc. + obesity. She denies taking prescription or OTC medications. Denies excess caffeine use. Denies EtOH, tobacco or illicit drug use. EKG showed a-fib with RVR. On dilt gtt in ICU now; HR remains  on average in a-fib. Still getting intermittent chest pressure. Trop negative. CIS following.

## 2022-03-29 NOTE — ED TRIAGE NOTES
26 y.o. female presents to ER ED 04/ED 04   Chief Complaint   Patient presents with    Palpitations     Pt presents to ED with c/o palpitations x20 minutes. Pt also reports SOB and midsternal chest pain that radiates outwards.

## 2022-03-29 NOTE — EICU
Rounding (Video Assessment):  Yes    Comments: video assessment complete. Pt lying in bed, awake and alert. No complaints at this time. VSS and NAD noted.

## 2022-03-29 NOTE — ED NOTES
Pt provided a urine speciman cup, castile soap towelette wipe, and instructions for MSCC and UPT; pt verbalizes understanding.  Will continue to monitor.

## 2022-03-29 NOTE — ASSESSMENT & PLAN NOTE
Patient very anxious this AM and admits to h/o anxiety but not on SSRI  Agreeable to starting zoloft  PRN Xanax ordered

## 2022-03-29 NOTE — NURSING
0500 - Patient's blood pressure was 99/78, HR 74, 02 95%, RR 16. Cardizem drip was titrated down to 12.5. Dr. Gross was notified.  Monitoring continues

## 2022-03-29 NOTE — EICU
New Patient Evaluation    HPI:  26 F obese (BMI43) presented with palpitation accompanied by mid sternal chest pain and shortness of breath.  She was in afib RVR 150s on arrival.    Camera Assessment:  /95  HR 90  O2 96%  No leg swelling as per bedside assessment    Data:  No significant electrolyte abnormality  Tox screen negative  UPT negative  TSH 3.871  Trop I 0.007    Assessment and Plans:  New onset afib now rate controlled after cardizem drip. Also started on full dose anticoagulation. Wells criteria low prob, will check d dimer. If elevated will need PE study. Of note patient has Depo-provera on medication list.

## 2022-03-29 NOTE — ED PROVIDER NOTES
"Encounter Date: 3/28/2022       History     Chief Complaint   Patient presents with    Palpitations     Pt presents to ED with c/o palpitations x20 minutes. Pt also reports SOB and midsternal chest pain that radiates outwards.      26-year-old white female presents to the ER complaining of palpitations just prior to arrival.  Patient has no significant past medical history.  Patient was laying down when she felt her heart started to race.  Patient reports "slight" mid-sternal chest pain without radiation and SOB. No over-the-counter medications were tried prior to arrival.  Patient denies previous episode.  Patient denies fever, cough, congestion, ear pain, throat pain, headache, lightheadedness, dizziness, vomiting, diarrhea, abdominal pain, changes in urinary or bowel habits.  Patient denies recent travel or known sick contacts.  Patient has been able to tolerate p.o. intake.        Review of patient's allergies indicates:   Allergen Reactions    Allergenic extract-food-shrimp Edema    Pollen extracts      General allergic rhinitis      History reviewed. No pertinent past medical history.  Past Surgical History:   Procedure Laterality Date     SECTION N/A 2021    Procedure: PRIMARY  SECTION;  Surgeon: Mehran Fish MD;  Location: [facility] OR;  Service: OB/GYN;  Laterality: N/A;    COLONOSCOPY      ESOPHAGOSCOPY      TONSILLECTOMY, ADENOIDECTOMY, BILATERAL MYRINGOTOMY AND TUBES      WISDOM TOOTH EXTRACTION       Family History   Problem Relation Age of Onset    Breast cancer Neg Hx     Colon cancer Neg Hx     Ovarian cancer Neg Hx      Social History     Tobacco Use    Smoking status: Never Smoker    Smokeless tobacco: Never Used   Substance Use Topics    Alcohol use: Not Currently     Comment: occ    Drug use: Never     Review of Systems   Constitutional: Positive for chills. Negative for fever.   HENT: Negative for ear discharge, ear pain, nosebleeds, sinus pressure, sinus " pain, sore throat and trouble swallowing.    Eyes: Negative for discharge and visual disturbance.   Respiratory: Positive for shortness of breath. Negative for cough and wheezing.    Cardiovascular: Positive for chest pain and palpitations.   Gastrointestinal: Positive for nausea. Negative for abdominal pain, diarrhea and vomiting.   Genitourinary: Negative for dysuria.   Musculoskeletal: Negative for back pain.   Skin: Negative for rash.   Neurological: Negative for dizziness, seizures, weakness, light-headedness and headaches.   Hematological: Does not bruise/bleed easily.       Physical Exam     Initial Vitals [03/28/22 2018]   BP Pulse Resp Temp SpO2   (!) 143/93 (!) 142 20 97.8 °F (36.6 °C) 100 %      MAP       --         Physical Exam    Nursing note and vitals reviewed.  Constitutional: She appears well-developed and well-nourished. She is not diaphoretic. She is Obese . She is active and cooperative.  Non-toxic appearance. She does not have a sickly appearance. She does not appear ill. No distress.   Patient ambulated to ED bed without limitations or assistance.  Patient is speaking in full sentences without distress.  Patient does not appear to be in pain or ill.   HENT:   Head: Normocephalic and atraumatic.   Right Ear: External ear normal.   Left Ear: External ear normal.   Nose: Nose normal.   Mouth/Throat: Oropharynx is clear and moist. No tonsillar abscesses.   Eyes: Conjunctivae and EOM are normal. Pupils are equal, round, and reactive to light.   Neck: Neck supple.   Cardiovascular: Normal heart sounds and intact distal pulses. An irregularly irregular rhythm present.  Tachycardia present.    Pulmonary/Chest: Breath sounds normal. No respiratory distress. She has no wheezes. She has no rhonchi. She has no rales.   Abdominal: Abdomen is soft. Bowel sounds are normal. She exhibits no distension. There is no abdominal tenderness. There is no rebound and no guarding.   Musculoskeletal:         General:  No edema. Normal range of motion.      Cervical back: Neck supple.     Neurological: She is alert and oriented to person, place, and time. She has normal strength. No cranial nerve deficit or sensory deficit. GCS score is 15. GCS eye subscore is 4. GCS verbal subscore is 5. GCS motor subscore is 6.   Skin: Skin is warm. Capillary refill takes less than 2 seconds. No rash noted.   Psychiatric: Her mood appears anxious.         ED Course   Critical Care    Date/Time: 3/28/2022 9:45 PM  Performed by: Pablo Cronin III, MD  Authorized by: Pablo Cronin III, MD   Total critical care time (exclusive of procedural time) : 35 minutes  Critical care was necessary to treat or prevent imminent or life-threatening deterioration of the following conditions: Atrial fibrillation with RVR.  Critical care was time spent personally by me on the following activities: blood draw for specimens, discussions with consultants, evaluation of patient's response to treatment, obtaining history from patient or surrogate, ordering and review of laboratory studies, pulse oximetry, review of old charts, re-evaluation of patient's condition, ordering and review of radiographic studies, ordering and performing treatments and interventions, examination of patient and development of treatment plan with patient or surrogate.        Labs Reviewed   CBC W/ AUTO DIFFERENTIAL - Abnormal; Notable for the following components:       Result Value    MCHC 31.6 (*)     Lymph # 5.1 (*)     Eos # 0.6 (*)     All other components within normal limits   URINALYSIS, REFLEX TO URINE CULTURE - Abnormal; Notable for the following components:    Appearance, UA Hazy (*)     All other components within normal limits    Narrative:     Specimen Source->Urine   COMPREHENSIVE METABOLIC PANEL   B-TYPE NATRIURETIC PEPTIDE   TROPONIN I   TSH   PREGNANCY TEST, URINE RAPID    Narrative:     Specimen Source->Urine   PROTIME-INR   APTT   SARS-COV-2 RNA AMPLIFICATION,  QUAL   DRUG SCREEN PANEL, URINE EMERGENCY    Narrative:     Specimen Source->Urine   D DIMER, QUANTITATIVE          Imaging Results          X-Ray Chest AP Portable (Final result)  Result time 03/28/22 21:24:35    Final result by Floyd Deluna MD (03/28/22 21:24:35)                 Impression:      No acute abnormality.      Electronically signed by: Floyd Deluna  Date:    03/28/2022  Time:    21:24             Narrative:    EXAMINATION:  XR CHEST AP PORTABLE    CLINICAL HISTORY:  Chest Pain;    TECHNIQUE:  Single frontal view of the chest was performed.    COMPARISON:  None    FINDINGS:  The lungs are clear, with normal appearance of pulmonary vasculature and no pleural effusion or pneumothorax.    The cardiac silhouette is normal in size. The hilar and mediastinal contours are unremarkable.    Bones are intact.                                 Medications   enoxaparin injection 120 mg (has no administration in time range)   lorazepam injection 2 mg (has no administration in time range)   sodium chloride 0.9% bolus 1,000 mL (0 mLs Intravenous Stopped 3/28/22 2139)   diltiaZEM injection 20 mg (20 mg Intravenous Given 3/28/22 2033)   diltiaZEM injection 10 mg (10 mg Intravenous Given 3/28/22 2101)   diltiaZEM 125 mg in D5W 125 mL infusion (5 mg/hr Intravenous New Bag 3/28/22 2145)                 ED Course as of 03/28/22 2145   Mon Mar 28, 2022   2025 This EKG was interpreted by myself, Dr. Cronin.  EKG - Rate 155. Atrial fibrillation with rapid ventricular response.  Possible anterior infarct, age undetermined.  Marked ST abnormality, possible inferior subendocardial injury.  No previous EKG for comparison.    [LG]   2028 CHADS2 Score - 0 [LG]   2107 Patient improved to 101 on the cardiac monitor.  Will repeat EKG [LG]   2125 This EKG was interpreted by myself, Dr. Cronin.  EKG #2 after IV Cardizem - Rate 131. Atrial fibrillation with RVR with PVCs.  Nonspecific T-wave abnormality.    [LG]   2143 Patient  accepted for ICU admission by Dr. Wood.  Would like patient on anticoagulation and Cardiology consult in the morning. [LG]      ED Course User Index  [LG] Pablo Cronin III, MD             Clinical Impression:   Final diagnoses:  [I48.91] Atrial fibrillation with RVR (Primary)  [Z20.822] COVID-19 virus not detected          ED Disposition Condition    Admit               Pablo Cronin III, MD  03/28/22 3795

## 2022-03-29 NOTE — CONSULTS
[facility] - Intensive Care  Cardiology  Consult Note    Patient Name: Joseph Pepper  MRN: [id number]  Admission Date: 3/28/2022  Hospital Length of Stay: 1 days  Code Status: Full Code   Attending Provider: Jose Wood MD   Consulting Provider: Bradley Roy NP  Primary Care Physician: Jesusita Avery NP  Principal Problem:<principal problem not specified>    Patient information was obtained from ER records.     Consults  Subjective:     Chief Complaint:  CP, palpitations     HPI:   26 year old female with no significant medical history presents with c/o palpitations and nonexertional chest pain. Noted to be in new onset atrial fibrillation with RVR. [facility] consulted for management.       History reviewed. No pertinent past medical history.    Past Surgical History:   Procedure Laterality Date     SECTION N/A 2021    Procedure: PRIMARY  SECTION;  Surgeon: Mehran Fish MD;  Location: [facility];  Service: OB/GYN;  Laterality: N/A;    COLONOSCOPY      ESOPHAGOSCOPY      TONSILLECTOMY, ADENOIDECTOMY, BILATERAL MYRINGOTOMY AND TUBES      WISDOM TOOTH EXTRACTION         Review of patient's allergies indicates:   Allergen Reactions    Allergenic extract-food-shrimp Edema    Pollen extracts      General allergic rhinitis        No current facility-administered medications on file prior to encounter.     Current Outpatient Medications on File Prior to Encounter   Medication Sig    docusate sodium (COLACE) 100 MG capsule Take 100 mg by mouth 2 (two) times daily.    famotidine (PEPCID) 20 MG tablet Take 20 mg by mouth 2 (two) times daily.    medroxyPROGESTERone (DEPO-PROVERA) 150 mg/mL injection Inject 1 mL (150 mg total) into the muscle every 3 (three) months.     Family History    None       Tobacco Use    Smoking status: Never Smoker    Smokeless tobacco: Never Used   Substance and Sexual Activity    Alcohol use: Not Currently     Comment: occ    Drug use: Never     Sexual activity: Yes     Partners: Male     Birth control/protection: Injection     Comment:      Review of Systems   Constitutional: Negative.   HENT: Negative.     Eyes: Negative.    Cardiovascular:  Positive for chest pain, irregular heartbeat and palpitations. Negative for claudication, cyanosis, dyspnea on exertion, leg swelling, near-syncope, orthopnea and paroxysmal nocturnal dyspnea.   Respiratory: Negative.     Endocrine: Negative.    Hematologic/Lymphatic: Negative.    Skin: Negative.    Musculoskeletal: Negative.    Gastrointestinal: Negative.    Genitourinary: Negative.    Neurological: Negative.    Psychiatric/Behavioral: Negative.     Allergic/Immunologic: Negative.    Objective:     Vital Signs (Most Recent):  Temp: 97.4 °F (36.3 °C) (03/29/22 0730)  Pulse: 79 (03/29/22 0601)  Resp: 19 (03/29/22 0601)  BP: (!) 117/95 (03/29/22 0601)  SpO2: 95 % (03/29/22 0601)   Vital Signs (24h Range):  Temp:  [96.7 °F (35.9 °C)-98.2 °F (36.8 °C)] 97.4 °F (36.3 °C)  Pulse:  [] 79  Resp:  [16-28] 19  SpO2:  [92 %-100 %] 95 %  BP: ()/(72-96) 117/95     Weight: 123.8 kg (273 lb)  Body mass index is 42.76 kg/m².    SpO2: 95 %  O2 Device (Oxygen Therapy): room air      Intake/Output Summary (Last 24 hours) at 3/29/2022 0822  Last data filed at 3/29/2022 0700  Gross per 24 hour   Intake 1000 ml   Output 1300 ml   Net -300 ml       Lines/Drains/Airways       Epidural Line  Duration                  Epidural Catheter 02/24/21 397 days              Peripheral Intravenous Line  Duration                  Peripheral IV - Single Lumen 03/28/22 2204 20 G Left Wrist <1 day         Peripheral IV - Single Lumen 03/29/22 0116 20 G Left Forearm <1 day                    Physical Exam  Constitutional:       General: She is not in acute distress.     Appearance: She is obese. She is not ill-appearing or toxic-appearing.   Cardiovascular:      Rate and Rhythm: Tachycardia present. Rhythm irregular.      Pulses: Normal  pulses.      Heart sounds: Normal heart sounds. No murmur heard.    No friction rub. No gallop.   Pulmonary:      Effort: Pulmonary effort is normal. No respiratory distress.      Breath sounds: Normal breath sounds. No stridor. No wheezing, rhonchi or rales.   Chest:      Chest wall: No tenderness.   Abdominal:      General: Abdomen is flat. Bowel sounds are normal.      Palpations: Abdomen is soft.   Musculoskeletal:         General: Normal range of motion.      Cervical back: Normal range of motion and neck supple.      Right lower leg: No edema.      Left lower leg: No edema.   Skin:     General: Skin is warm and dry.      Capillary Refill: Capillary refill takes less than 2 seconds.   Neurological:      General: No focal deficit present.      Mental Status: She is alert and oriented to person, place, and time. Mental status is at baseline.   Psychiatric:         Mood and Affect: Mood normal.         Behavior: Behavior normal.         Thought Content: Thought content normal.         Judgment: Judgment normal.       Significant Labs:   Recent Lab Results  (Last 5 results in the past 24 hours)        03/29/22  0726   03/29/22  0723   03/29/22  0414   03/29/22  0146   03/28/22  2053        Albumin     3.4           Alkaline Phosphatase     84           ALT     14           Anion Gap     9           AST     10           Baso #       0.07         Basophil %       0.7         BILIRUBIN TOTAL     0.2  Comment: For infants and newborns, interpretation of results should be based  on gestational age, weight and in agreement with clinical  observations.    Premature Infant recommended reference ranges:  Up to 24 hours.............<8.0 mg/dL  Up to 48 hours............<12.0 mg/dL  3-5 days..................<15.0 mg/dL  6-29 days.................<15.0 mg/dL             BUN     11           Calcium     8.7           Chloride     109           CO2     21           Creatinine     0.7           D-Dimer 0.31  Comment: The  quantitative D-dimer assay should be used as an aid in   the diagnosis of deep vein thrombosis and pulmonary embolism  in patients with the appropriate presentation and clinical  history. The upper limit of the reference interval and the clinical   cut off   point are identical. Causes of a positive (>0.50 mg/L FEU) D-Dimer   test  include, but are not limited to: DVT, PE, DIC, thrombolytic   therapy, anticoagulant therapy, recent surgery, trauma, or   pregnancy, disseminated malignancy, aortic aneurysm, cirrhosis,  and severe infection. False negative results may occur in   patients with distal DVT.  SHEREEN^612^^3^  LOT^610^DDiSup^[id number]\DDiBuf^[id number]\DDiReag^[id number]                 Differential Method       Automated         eGFR if      >60           eGFR if non      >60  Comment: Calculation used to obtain the estimated glomerular filtration  rate (eGFR) is the CKD-EPI equation.              Eos #       0.7         Eosinophil %       6.4         Glucose     103           Gran # (ANC)       4.6         Gran %       44.7         Hematocrit       43.7         Hemoglobin       14.1         Immature Grans (Abs)       0.02  Comment: Mild elevation in immature granulocytes is non specific and   can be seen in a variety of conditions including stress response,   acute inflammation, trauma and pregnancy. Correlation with other   laboratory and clinical findings is essential.           Immature Granulocytes       0.2         Lymph #       4.3         Lymph %       41.3         MCH       27.6         MCHC       32.3         MCV       86         Mono #       0.7         Mono %       6.7         MPV       10.4         nRBC       0         Platelets       327         Potassium     3.9           PROTEIN TOTAL     6.9           RBC       5.10         RDW       13.4         SARS-CoV-2 RNA, Amplification, Qual         Negative  Comment: This test utilizes isothermal nucleic acid amplification    technology to detect the SARS-CoV-2 RdRp nucleic acid segment.   The analytical sensitivity (limit of detection) is 125 genome   equivalents/mL.     A POSITIVE result implies infection with the SARS-CoV-2 virus;  the patient is presumed to be contagious.    A NEGATIVE result means that SARS-CoV-2 nucleic acids are not  present above the limit of detection. A NEGATIVE result should be   treated as presumptive. It does not rule out the possibility of   COVID-19 and should not be the sole basis for treatment decisions.   If COVID-19 is strongly suspected based on clinical and exposure   history, re-testing using an alternate molecular assay should be   considered.       This test is only for use under the Food and Drug   Administration s Emergency Use Authorization (EUA).   Commercial kits are provided by [employer].   Performance characteristics of the EUA have been independently  verified by [facility] Department of  Pathology and Laboratory Medicine.   _________________________________________________________________  The ID NOW COVID-19 Letter of Authorization, along with the   authorized Fact Sheet for Healthcare Providers, the authorized Fact  Sheet for Patients, and authorized labeling are available on the FDA   website:  www.fda.gov/MedicalDevices/Safety/EmergencySituations/rdq571622.htm         Sodium     139           Troponin I   <0.006  Comment: The reference interval for Troponin I represents the 99th percentile   cutoff   for our facility and is consistent with 3rd generation assay   performance.       0.007  Comment: The reference interval for Troponin I represents the 99th percentile   cutoff   for our facility and is consistent with 3rd generation assay   performance.           WBC       10.29                                Significant Imaging: CT scan: CT ABDOMEN PELVIS WITH CONTRAST: No results found for this visit on 03/28/22. and CT ABDOMEN PELVIS WITHOUT CONTRAST: No results  found for this visit on 03/28/22., Echocardiogram: 2D echo with color flow doppler: No results found for this or any previous visit. and Transthoracic echo (TTE) complete (Cupid Only): No results found for this or any previous visit., and X-Ray: CXR: X-Ray Chest 1 View (CXR): No results found for this visit on 03/28/22. and X-Ray Chest PA and Lateral (CXR): No results found for this visit on 03/28/22. and KUB: X-Ray Abdomen AP 1 View (KUB): No results found for this visit on 03/28/22.    Assessment and Plan:     No notes have been filed under this hospital service.  Service: Cardiology      VTE Risk Mitigation (From admission, onward)         Ordered     enoxaparin injection 120 mg  Every 12 hours (non-standard times)         03/29/22 0118              DX: New onset paroxysmal atrial fibrillation w/ RVR probably since last night  Morbid obesity with h/o gestational DM  Non smoker, No prior HTN  No BELKIS by Hx  FHX strong for CAD    Plan: Echo  Start metoprolol 50 mg PO q 6h for HR control  Check Mag, TSH  Continue Lovenox and iv Cardizem until rate controlled  Replete potassium for a goal of 4.0  Keep NPO after MN in case she needs TIA CV tomorrow  Consider flecainide if she remains in afib by end of today.   Maintain telemetry  Labs and EKG in AM    Thank you for your consult. I will follow-up with patient. Please contact us if you have any additional questions.    Transcribed by  Bradley Roy NP for Dr nacho Alonso  Cardiology   [facility] - Intensive Care  I attest that I have personally seen and examined this patient. I have reviewed and discussed the management in detail as outlined above.

## 2022-03-29 NOTE — H&P
[facility] Medicine  History & Physical    Patient Name: Joseph Pepper  MRN: [id number]  Patient Class: IP- Inpatient  Admission Date: 3/28/2022  Attending Physician: Jose Wood MD   Primary Care Provider: Jesusita Avery NP         Patient information was obtained from patient, relative(s) and ER records.     Subjective:     Principal Problem:Atrial fibrillation with RVR    Chief Complaint:   Chief Complaint   Patient presents with    Palpitations     Pt presents to ED with c/o palpitations x20 minutes. Pt also reports SOB and midsternal chest pain that radiates outwards.         HPI: Patient presented to ER with 1 day h/o palpitations. She felt like her heart was racing. Caused pressure in center of chest. No sob, dizziness, syncope, diaphoresis. She has h/o gestational diabetes but no active DM, HTN, HLD, etc. + obesity. She denies taking prescription or OTC medications. Denies excess caffeine use. Denies EtOH, tobacco or illicit drug use. EKG showed a-fib with RVR. On dilt gtt in ICU now; HR remains  on average in a-fib. Still getting intermittent chest pressure. Trop negative. CIS following.       History reviewed. No pertinent past medical history.    Past Surgical History:   Procedure Laterality Date     SECTION N/A 2021    Procedure: PRIMARY  SECTION;  Surgeon: Mehran Fish MD;  Location: [facility];  Service: OB/GYN;  Laterality: N/A;    COLONOSCOPY      ESOPHAGOSCOPY      TONSILLECTOMY, ADENOIDECTOMY, BILATERAL MYRINGOTOMY AND TUBES      WISDOM TOOTH EXTRACTION         Review of patient's allergies indicates:   Allergen Reactions    Allergenic extract-food-shrimp Edema    Pollen extracts      General allergic rhinitis        No current facility-administered medications on file prior to encounter.     Current Outpatient Medications on File Prior to Encounter   Medication Sig    docusate sodium (COLACE) 100 MG capsule Take  100 mg by mouth 2 (two) times daily.    famotidine (PEPCID) 20 MG tablet Take 20 mg by mouth 2 (two) times daily.    medroxyPROGESTERone (DEPO-PROVERA) 150 mg/mL injection Inject 1 mL (150 mg total) into the muscle every 3 (three) months.     Family History    None       Tobacco Use    Smoking status: Never Smoker    Smokeless tobacco: Never Used   Substance and Sexual Activity    Alcohol use: Not Currently     Comment: occ    Drug use: Never    Sexual activity: Yes     Partners: Male     Birth control/protection: Injection     Comment:      Review of Systems   Constitutional:  Negative for activity change, fatigue, fever and unexpected weight change.   HENT:  Negative for congestion, ear pain, hearing loss, rhinorrhea, sore throat and tinnitus.    Eyes:  Negative for pain, redness and visual disturbance.   Respiratory:  Positive for chest tightness. Negative for cough, shortness of breath and wheezing.    Cardiovascular:  Positive for palpitations. Negative for chest pain and leg swelling.   Gastrointestinal:  Negative for abdominal pain, blood in stool, constipation, diarrhea, nausea and vomiting.   Genitourinary:  Negative for dysuria, frequency, pelvic pain and urgency.   Musculoskeletal:  Negative for back pain, joint swelling and neck pain.   Skin:  Negative for color change, rash and wound.   Neurological:  Negative for dizziness, tremors, weakness, light-headedness and headaches.   Objective:     Vital Signs (Most Recent):  Temp: 97.4 °F (36.3 °C) (03/29/22 0800)  Pulse: 76 (03/29/22 1100)  Resp: 19 (03/29/22 1100)  BP: 103/79 (03/29/22 1100)  SpO2: (!) 93 % (03/29/22 1100)   Vital Signs (24h Range):  Temp:  [96.7 °F (35.9 °C)-98.2 °F (36.8 °C)] 97.4 °F (36.3 °C)  Pulse:  [] 76  Resp:  [16-28] 19  SpO2:  [92 %-100 %] 93 %  BP: ()/(72-96) 103/79     Weight: 128.9 kg (284 lb 2.8 oz)  Body mass index is 44.51 kg/m².    Physical Exam  Vitals and nursing note reviewed.   Constitutional:        General: She is not in acute distress.     Appearance: She is well-developed. She is obese.   HENT:      Head: Normocephalic and atraumatic.      Right Ear: External ear normal.      Left Ear: External ear normal.      Nose: Nose normal.   Eyes:      General:         Right eye: No discharge.         Left eye: No discharge.      Extraocular Movements: Extraocular movements intact.      Conjunctiva/sclera: Conjunctivae normal.      Pupils: Pupils are equal, round, and reactive to light.   Neck:      Thyroid: No thyromegaly.   Cardiovascular:      Rate and Rhythm: Tachycardia present. Rhythm irregular.      Heart sounds: No murmur heard.    No gallop.   Pulmonary:      Effort: Pulmonary effort is normal. No respiratory distress.      Breath sounds: Normal breath sounds. No wheezing or rales.   Abdominal:      General: Bowel sounds are normal. There is no distension.      Palpations: Abdomen is soft.      Tenderness: There is no abdominal tenderness.   Skin:     General: Skin is warm and dry.   Neurological:      Mental Status: She is alert and oriented to person, place, and time.      Cranial Nerves: No cranial nerve deficit.   Psychiatric:         Behavior: Behavior normal.         Thought Content: Thought content normal.         CRANIAL NERVES     CN III, IV, VI   Pupils are equal, round, and reactive to light.     Significant Labs: All pertinent labs within the past 24 hours have been reviewed.  CBC:   Recent Labs   Lab 03/28/22 2025 03/29/22  0146   WBC 12.51 10.29   HGB 14.9 14.1   HCT 47.1 43.7    327     CMP:   Recent Labs   Lab 03/28/22 2025 03/29/22  0414    139   K 3.9 3.9    109   CO2 24 21*   GLU 95 103   BUN 15 11   CREATININE 0.8 0.7   CALCIUM 10.1 8.7   PROT 8.3 6.9   ALBUMIN 3.9 3.4*   BILITOT 0.1 0.2   ALKPHOS 100 84   AST 14 10   ALT 15 14   ANIONGAP 12 9   EGFRNONAA >60 >60     Cardiac Markers:   Recent Labs   Lab 03/28/22 2025   BNP 28     Troponin:   Recent Labs   Lab  03/28/22 2025 03/29/22  0146 03/29/22  0723   TROPONINI <0.006 0.007 <0.006     TSH:   Recent Labs   Lab 03/28/22 2025   TSH 3.871       Significant Imaging: I have reviewed all pertinent imaging results/findings within the past 24 hours.    Assessment/Plan:     * Atrial fibrillation with RVR  New onset  CIS consulted  On dilt gtt, wean  PO metoprolol started per CIS  lovenox for anticoagulatoin (? Cardioversion tomorrow)  TTE  TSH noramal  Replete electrolytes PRN, AM labs ordered; Mg pending today  IVF      Anxiety  Patient very anxious this AM and admits to h/o anxiety but not on SSRI  Agreeable to starting zoloft  PRN Xanax ordered      BMI 40.0-44.9, adult  Diet, exercise, weight loss        VTE Risk Mitigation (From admission, onward)         Ordered     enoxaparin injection 120 mg  Every 12 hours (non-standard times)         03/29/22 0118              Critical care time spent on the evaluation and treatment of severe organ dysfunction, review of pertinent labs and imaging studies, discussions with consulting providers and discussions with patient/family: 38 minutes.     Selma Rivera MD  Department of Hospital Medicine   [facility] - Intensive Care

## 2022-03-29 NOTE — SUBJECTIVE & OBJECTIVE
History reviewed. No pertinent past medical history.    Past Surgical History:   Procedure Laterality Date     SECTION N/A 2021    Procedure: PRIMARY  SECTION;  Surgeon: Mehran Fish MD;  [facility];  Service: OB/GYN;  Laterality: N/A;    COLONOSCOPY      ESOPHAGOSCOPY      TONSILLECTOMY, ADENOIDECTOMY, BILATERAL MYRINGOTOMY AND TUBES      WISDOM TOOTH EXTRACTION         Review of patient's allergies indicates:   Allergen Reactions    Allergenic extract-food-shrimp Edema    Pollen extracts      General allergic rhinitis        No current facility-administered medications on file prior to encounter.     Current Outpatient Medications on File Prior to Encounter   Medication Sig    docusate sodium (COLACE) 100 MG capsule Take 100 mg by mouth 2 (two) times daily.    famotidine (PEPCID) 20 MG tablet Take 20 mg by mouth 2 (two) times daily.    medroxyPROGESTERone (DEPO-PROVERA) 150 mg/mL injection Inject 1 mL (150 mg total) into the muscle every 3 (three) months.     Family History    None       Tobacco Use    Smoking status: Never Smoker    Smokeless tobacco: Never Used   Substance and Sexual Activity    Alcohol use: Not Currently     Comment: occ    Drug use: Never    Sexual activity: Yes     Partners: Male     Birth control/protection: Injection     Comment:      Review of Systems   Constitutional:  Negative for activity change, fatigue, fever and unexpected weight change.   HENT:  Negative for congestion, ear pain, hearing loss, rhinorrhea, sore throat and tinnitus.    Eyes:  Negative for pain, redness and visual disturbance.   Respiratory:  Positive for chest tightness. Negative for cough, shortness of breath and wheezing.    Cardiovascular:  Positive for palpitations. Negative for chest pain and leg swelling.   Gastrointestinal:  Negative for abdominal pain, blood in stool, constipation, diarrhea, nausea and vomiting.   Genitourinary:  Negative for dysuria, frequency, pelvic  pain and urgency.   Musculoskeletal:  Negative for back pain, joint swelling and neck pain.   Skin:  Negative for color change, rash and wound.   Neurological:  Negative for dizziness, tremors, weakness, light-headedness and headaches.   Objective:     Vital Signs (Most Recent):  Temp: 97.4 °F (36.3 °C) (03/29/22 0800)  Pulse: 76 (03/29/22 1100)  Resp: 19 (03/29/22 1100)  BP: 103/79 (03/29/22 1100)  SpO2: (!) 93 % (03/29/22 1100)   Vital Signs (24h Range):  Temp:  [96.7 °F (35.9 °C)-98.2 °F (36.8 °C)] 97.4 °F (36.3 °C)  Pulse:  [] 76  Resp:  [16-28] 19  SpO2:  [92 %-100 %] 93 %  BP: ()/(72-96) 103/79     Weight: 128.9 kg (284 lb 2.8 oz)  Body mass index is 44.51 kg/m².    Physical Exam  Vitals and nursing note reviewed.   Constitutional:       General: She is not in acute distress.     Appearance: She is well-developed. She is obese.   HENT:      Head: Normocephalic and atraumatic.      Right Ear: External ear normal.      Left Ear: External ear normal.      Nose: Nose normal.   Eyes:      General:         Right eye: No discharge.         Left eye: No discharge.      Extraocular Movements: Extraocular movements intact.      Conjunctiva/sclera: Conjunctivae normal.      Pupils: Pupils are equal, round, and reactive to light.   Neck:      Thyroid: No thyromegaly.   Cardiovascular:      Rate and Rhythm: Tachycardia present. Rhythm irregular.      Heart sounds: No murmur heard.    No gallop.   Pulmonary:      Effort: Pulmonary effort is normal. No respiratory distress.      Breath sounds: Normal breath sounds. No wheezing or rales.   Abdominal:      General: Bowel sounds are normal. There is no distension.      Palpations: Abdomen is soft.      Tenderness: There is no abdominal tenderness.   Skin:     General: Skin is warm and dry.   Neurological:      Mental Status: She is alert and oriented to person, place, and time.      Cranial Nerves: No cranial nerve deficit.   Psychiatric:         Behavior: Behavior  normal.         Thought Content: Thought content normal.         CRANIAL NERVES     CN III, IV, VI   Pupils are equal, round, and reactive to light.     Significant Labs: All pertinent labs within the past 24 hours have been reviewed.  CBC:   Recent Labs   Lab 03/28/22 2025 03/29/22 0146   WBC 12.51 10.29   HGB 14.9 14.1   HCT 47.1 43.7    327     CMP:   Recent Labs   Lab 03/28/22 2025 03/29/22  0414    139   K 3.9 3.9    109   CO2 24 21*   GLU 95 103   BUN 15 11   CREATININE 0.8 0.7   CALCIUM 10.1 8.7   PROT 8.3 6.9   ALBUMIN 3.9 3.4*   BILITOT 0.1 0.2   ALKPHOS 100 84   AST 14 10   ALT 15 14   ANIONGAP 12 9   EGFRNONAA >60 >60     Cardiac Markers:   Recent Labs   Lab 03/28/22 2025   BNP 28     Troponin:   Recent Labs   Lab 03/28/22 2025 03/29/22 0146 03/29/22  0723   TROPONINI <0.006 0.007 <0.006     TSH:   Recent Labs   Lab 03/28/22 2025   TSH 3.871       Significant Imaging: I have reviewed all pertinent imaging results/findings within the past 24 hours.

## 2022-03-29 NOTE — ASSESSMENT & PLAN NOTE
New onset  CIS consulted  On dilt gtt, wean  PO metoprolol started per CIS  lovenox for anticoagulatoin (? Cardioversion tomorrow)  TTE  TSH noramal  Replete electrolytes PRN, AM labs ordered; Mg pending today  IVF

## 2022-03-30 ENCOUNTER — ANESTHESIA EVENT (OUTPATIENT)
Dept: SURGERY | Facility: HOSPITAL | Age: 27
DRG: 309 | End: 2022-03-30
Payer: MEDICAID

## 2022-03-30 ENCOUNTER — ANESTHESIA (OUTPATIENT)
Dept: SURGERY | Facility: HOSPITAL | Age: 27
DRG: 309 | End: 2022-03-30
Payer: MEDICAID

## 2022-03-30 VITALS
HEIGHT: 67 IN | OXYGEN SATURATION: 92 % | RESPIRATION RATE: 22 BRPM | HEART RATE: 87 BPM | BODY MASS INDEX: 44.61 KG/M2 | TEMPERATURE: 98 F | WEIGHT: 284.19 LBS | DIASTOLIC BLOOD PRESSURE: 92 MMHG | SYSTOLIC BLOOD PRESSURE: 123 MMHG

## 2022-03-30 PROBLEM — G47.33 OBSTRUCTIVE SLEEP APNEA: Status: ACTIVE | Noted: 2022-03-30

## 2022-03-30 LAB
ALBUMIN SERPL BCP-MCNC: 3.6 G/DL (ref 3.5–5.2)
ALP SERPL-CCNC: 87 U/L (ref 55–135)
ALT SERPL W/O P-5'-P-CCNC: 16 U/L (ref 10–44)
ANION GAP SERPL CALC-SCNC: 11 MMOL/L (ref 8–16)
AST SERPL-CCNC: 11 U/L (ref 10–40)
BILIRUB SERPL-MCNC: 0.4 MG/DL (ref 0.1–1)
BSA FOR ECHO PROCEDURE: 2.47 M2
BUN SERPL-MCNC: 11 MG/DL (ref 6–20)
CALCIUM SERPL-MCNC: 9 MG/DL (ref 8.7–10.5)
CHLORIDE SERPL-SCNC: 106 MMOL/L (ref 95–110)
CO2 SERPL-SCNC: 20 MMOL/L (ref 23–29)
CREAT SERPL-MCNC: 0.7 MG/DL (ref 0.5–1.4)
EJECTION FRACTION: 55 %
EST. GFR  (AFRICAN AMERICAN): >60 ML/MIN/1.73 M^2
EST. GFR  (NON AFRICAN AMERICAN): >60 ML/MIN/1.73 M^2
GLUCOSE SERPL-MCNC: 103 MG/DL (ref 70–110)
MAGNESIUM SERPL-MCNC: 2.2 MG/DL (ref 1.6–2.6)
POTASSIUM SERPL-SCNC: 4.2 MMOL/L (ref 3.5–5.1)
PROT SERPL-MCNC: 7.4 G/DL (ref 6–8.4)
SODIUM SERPL-SCNC: 137 MMOL/L (ref 136–145)

## 2022-03-30 PROCEDURE — 36415 COLL VENOUS BLD VENIPUNCTURE: CPT | Performed by: INTERNAL MEDICINE

## 2022-03-30 PROCEDURE — 25000003 PHARM REV CODE 250: Performed by: INTERNAL MEDICINE

## 2022-03-30 PROCEDURE — 93010 ELECTROCARDIOGRAM REPORT: CPT | Mod: ,,, | Performed by: INTERNAL MEDICINE

## 2022-03-30 PROCEDURE — 25000003 PHARM REV CODE 250: Performed by: FAMILY MEDICINE

## 2022-03-30 PROCEDURE — 63600175 PHARM REV CODE 636 W HCPCS: Performed by: FAMILY MEDICINE

## 2022-03-30 PROCEDURE — 99239 PR HOSPITAL DISCHARGE DAY,>30 MIN: ICD-10-PCS | Mod: ,,, | Performed by: FAMILY MEDICINE

## 2022-03-30 PROCEDURE — 63600175 PHARM REV CODE 636 W HCPCS: Performed by: NURSE ANESTHETIST, CERTIFIED REGISTERED

## 2022-03-30 PROCEDURE — 80053 COMPREHEN METABOLIC PANEL: CPT | Performed by: INTERNAL MEDICINE

## 2022-03-30 PROCEDURE — 36000706: Performed by: INTERNAL MEDICINE

## 2022-03-30 PROCEDURE — 71000033 HC RECOVERY, INTIAL HOUR: Performed by: INTERNAL MEDICINE

## 2022-03-30 PROCEDURE — 37000008 HC ANESTHESIA 1ST 15 MINUTES: Performed by: INTERNAL MEDICINE

## 2022-03-30 PROCEDURE — 83735 ASSAY OF MAGNESIUM: CPT | Performed by: INTERNAL MEDICINE

## 2022-03-30 PROCEDURE — 93005 ELECTROCARDIOGRAM TRACING: CPT

## 2022-03-30 PROCEDURE — 93010 EKG 12-LEAD: ICD-10-PCS | Mod: ,,, | Performed by: INTERNAL MEDICINE

## 2022-03-30 PROCEDURE — 01922 ANES N-INVAS IMG/RADJ THER: CPT | Mod: QZ | Performed by: NURSE ANESTHETIST, CERTIFIED REGISTERED

## 2022-03-30 PROCEDURE — 25000003 PHARM REV CODE 250: Performed by: NURSE PRACTITIONER

## 2022-03-30 PROCEDURE — 99239 HOSP IP/OBS DSCHRG MGMT >30: CPT | Mod: ,,, | Performed by: FAMILY MEDICINE

## 2022-03-30 PROCEDURE — 25000003 PHARM REV CODE 250: Performed by: NURSE ANESTHETIST, CERTIFIED REGISTERED

## 2022-03-30 PROCEDURE — 37000009 HC ANESTHESIA EA ADD 15 MINS: Performed by: INTERNAL MEDICINE

## 2022-03-30 PROCEDURE — 63600175 PHARM REV CODE 636 W HCPCS: Performed by: INTERNAL MEDICINE

## 2022-03-30 PROCEDURE — 36000707: Performed by: INTERNAL MEDICINE

## 2022-03-30 RX ORDER — SODIUM CHLORIDE 9 MG/ML
INJECTION, SOLUTION INTRAVENOUS ONCE
Status: COMPLETED | OUTPATIENT
Start: 2022-03-30 | End: 2022-03-30

## 2022-03-30 RX ORDER — PROPOFOL 10 MG/ML
VIAL (ML) INTRAVENOUS
Status: DISCONTINUED | OUTPATIENT
Start: 2022-03-30 | End: 2022-03-30

## 2022-03-30 RX ORDER — SERTRALINE HYDROCHLORIDE 25 MG/1
25 TABLET, FILM COATED ORAL DAILY
Qty: 30 TABLET | Refills: 0 | Status: SHIPPED | OUTPATIENT
Start: 2022-03-31 | End: 2022-10-04

## 2022-03-30 RX ORDER — FENTANYL CITRATE 50 UG/ML
INJECTION, SOLUTION INTRAMUSCULAR; INTRAVENOUS
Status: COMPLETED
Start: 2022-03-30 | End: 2022-03-30

## 2022-03-30 RX ORDER — METOPROLOL TARTRATE 100 MG/1
100 TABLET ORAL 2 TIMES DAILY
Qty: 60 TABLET | Refills: 2 | Status: SHIPPED | OUTPATIENT
Start: 2022-03-30 | End: 2023-11-22

## 2022-03-30 RX ORDER — FENTANYL CITRATE 50 UG/ML
INJECTION, SOLUTION INTRAMUSCULAR; INTRAVENOUS
Status: DISCONTINUED | OUTPATIENT
Start: 2022-03-30 | End: 2022-03-30

## 2022-03-30 RX ORDER — DIGOXIN 0.25 MG/ML
0.5 INJECTION INTRAMUSCULAR; INTRAVENOUS ONCE
Status: COMPLETED | OUTPATIENT
Start: 2022-03-30 | End: 2022-03-30

## 2022-03-30 RX ORDER — PROPOFOL 10 MG/ML
INJECTION, EMULSION INTRAVENOUS
Status: COMPLETED
Start: 2022-03-30 | End: 2022-03-30

## 2022-03-30 RX ORDER — ALPRAZOLAM 0.25 MG/1
0.25 TABLET ORAL 3 TIMES DAILY PRN
Qty: 10 TABLET | Refills: 0 | Status: SHIPPED | OUTPATIENT
Start: 2022-03-30 | End: 2023-11-16

## 2022-03-30 RX ORDER — LIDOCAINE HYDROCHLORIDE 20 MG/ML
INJECTION, SOLUTION EPIDURAL; INFILTRATION; INTRACAUDAL; PERINEURAL
Status: DISCONTINUED | OUTPATIENT
Start: 2022-03-30 | End: 2022-03-30

## 2022-03-30 RX ORDER — SODIUM CHLORIDE 0.9 % (FLUSH) 0.9 %
10 SYRINGE (ML) INJECTION
Status: DISCONTINUED | OUTPATIENT
Start: 2022-03-30 | End: 2022-03-30 | Stop reason: HOSPADM

## 2022-03-30 RX ORDER — METOPROLOL TARTRATE 50 MG/1
100 TABLET ORAL 2 TIMES DAILY
Status: DISCONTINUED | OUTPATIENT
Start: 2022-03-30 | End: 2022-03-30 | Stop reason: HOSPADM

## 2022-03-30 RX ADMIN — FLECAINIDE ACETATE 50 MG: 50 TABLET ORAL at 07:03

## 2022-03-30 RX ADMIN — FENTANYL CITRATE 100 MCG: 50 INJECTION, SOLUTION INTRAMUSCULAR; INTRAVENOUS at 12:03

## 2022-03-30 RX ADMIN — PROPOFOL 100 MG: 10 INJECTION, EMULSION INTRAVENOUS at 12:03

## 2022-03-30 RX ADMIN — ENOXAPARIN SODIUM 120 MG: 120 INJECTION, SOLUTION INTRAVENOUS; SUBCUTANEOUS at 09:03

## 2022-03-30 RX ADMIN — DIGOXIN 0.5 MG: 0.25 INJECTION INTRAMUSCULAR; INTRAVENOUS at 07:03

## 2022-03-30 RX ADMIN — LIDOCAINE HYDROCHLORIDE 50 MG: 20 INJECTION, SOLUTION EPIDURAL; INFILTRATION; INTRACAUDAL; PERINEURAL at 12:03

## 2022-03-30 RX ADMIN — SODIUM CHLORIDE, SODIUM LACTATE, POTASSIUM CHLORIDE, AND CALCIUM CHLORIDE: .6; .31; .03; .02 INJECTION, SOLUTION INTRAVENOUS at 12:03

## 2022-03-30 RX ADMIN — MUPIROCIN: 20 OINTMENT TOPICAL at 08:03

## 2022-03-30 RX ADMIN — TOPICAL ANESTHETIC 1 EACH: 200 SPRAY DENTAL; PERIODONTAL at 12:03

## 2022-03-30 RX ADMIN — DILTIAZEM HYDROCHLORIDE 10 MG/HR: 5 INJECTION INTRAVENOUS at 12:03

## 2022-03-30 RX ADMIN — FAMOTIDINE 20 MG: 10 INJECTION, SOLUTION INTRAVENOUS at 08:03

## 2022-03-30 RX ADMIN — SODIUM CHLORIDE: 0.9 INJECTION, SOLUTION INTRAVENOUS at 01:03

## 2022-03-30 RX ADMIN — METOPROLOL TARTRATE 100 MG: 50 TABLET, FILM COATED ORAL at 07:03

## 2022-03-30 RX ADMIN — SERTRALINE HYDROCHLORIDE 25 MG: 25 TABLET ORAL at 08:03

## 2022-03-30 RX ADMIN — PROPOFOL 50 MG: 10 INJECTION, EMULSION INTRAVENOUS at 12:03

## 2022-03-30 NOTE — DISCHARGE SUMMARY
[facility] Medicine  Discharge Summary      Patient Name: Joseph Pepper  MRN: [id number]  Patient Class: IP- Inpatient  Admission Date: 3/28/2022  Hospital Length of Stay: 2 days  Discharge Date and Time:  03/30/2022 4:20 PM  Attending Physician: Jose Wood MD   Discharging Provider: Reno Forte MD  Primary Care Provider: Jesusita Avery NP      HPI:   Patient presented to ER with 1 day h/o palpitations. She felt like her heart was racing. Caused pressure in center of chest. No sob, dizziness, syncope, diaphoresis. She has h/o gestational diabetes but no active DM, HTN, HLD, etc. + obesity. She denies taking prescription or OTC medications. Denies excess caffeine use. Denies EtOH, tobacco or illicit drug use. EKG showed a-fib with RVR. On dilt gtt in ICU now; HR remains  on average in a-fib. Still getting intermittent chest pressure. Trop negative. CIS following.       Procedure(s) (LRB):  TRANSESOPHAGEAL ECHOCARDIOGRAM WITH POSSIBLE CARDIOVERSION (TIA W/ POSS CARDIOVERSION) (N/A)      Hospital Course:   Patient still in atrial fib.  Her heart rate is well controlled on Lopressor 100 mg twice daily and Cardizem drip.  Patient denies shortness of breath or chest pain.  She did have witnessed obstructive sleep apnea with oxygen desaturation into the 80% region.  Cardiology will attempt to cardiovert her with flecainide and digoxin.  She is still on Lovenox.  They may consider cardioversion today.  Otherwise patient is doing well.  She has no complaints.    Patient was brought to the endoscopy suite for a TIA procedure.  While there she converted to normal sinus rhythm.  She is feeling well and Cardiology feels she can now be discharged home with close follow-up.       Goals of Care Treatment Preferences:  Code Status: Full Code      Consults:   Consults (From admission, onward)        Status Ordering Provider     Cardiology  Once        Provider:  Alex LAO  MD Gavin    Acknowledged BHARAT SUN III          * Atrial fibrillation with RVR  New onset  CIS consulted  On dilt gtt, wean  PO metoprolol started per CIS  lovenox for anticoagulatoin (? Cardioversion tomorrow)  TTE  TSH noramal  Replete electrolytes PRN, AM labs ordered; Mg pending today    Patient will be discharged today  Continue metoprolol 100 mg p.o. b.i.d.  Start patient on Eliquis 5 mg p.o. b.i.d.  Discontinue flecainide  Follow up with Cardiology in 1 week      Obstructive sleep apnea  Patient will need a sleep study as an outpatient.      Anxiety  Patient very anxious this AM and admits to h/o anxiety but not on SSRI  Agreeable to starting zoloft  PRN Xanax ordered  Follow-up with PCP in 2 days      BMI 40.0-44.9, adult  Diet, exercise, weight loss        Final Active Diagnoses:    Diagnosis Date Noted POA    PRINCIPAL PROBLEM:  Atrial fibrillation with RVR [I48.91] 03/29/2022 Yes    Obstructive sleep apnea [G47.33] 03/30/2022 Yes    Anxiety [F41.9] 03/29/2022 Yes    BMI 40.0-44.9, adult [Z68.41] 10/26/2020 Not Applicable      Problems Resolved During this Admission:       Discharged Condition: good    Disposition: Home or Self Care    Follow Up:   Follow-up Information     Jesusita Avery NP Follow up in 2 day(s).    Specialty: Family Medicine  Contact information:  [address]  [facility]  [place] [address]  [phone]                       Patient Instructions:   No discharge procedures on file.    Significant Diagnostic Studies:  See lab results    Pending Diagnostic Studies:     Procedure Component Value Units Date/Time    EKG 12-lead [[id number]]     Order Status: Sent Lab Status: No result          Medications:  Reconciled Home Medications:      Medication List      START taking these medications    ALPRAZolam 0.25 MG tablet  Commonly known as: XANAX  Take 1 tablet (0.25 mg total) by mouth 3 (three) times daily as needed for Anxiety.     apixaban 5 mg Tab  Commonly  known as: ELIQUIS  Take 1 tablet (5 mg total) by mouth 2 (two) times daily.     metoprolol tartrate 100 MG tablet  Commonly known as: LOPRESSOR  Take 1 tablet (100 mg total) by mouth 2 (two) times daily.     sertraline 25 MG tablet  Commonly known as: ZOLOFT  Take 1 tablet (25 mg total) by mouth once daily.  Start taking on: March 31, 2022        CONTINUE taking these medications    docusate sodium 100 MG capsule  Commonly known as: COLACE  Take 100 mg by mouth 2 (two) times daily.     famotidine 20 MG tablet  Commonly known as: PEPCID  Take 20 mg by mouth 2 (two) times daily.     medroxyPROGESTERone 150 mg/mL injection  Commonly known as: DEPO-PROVERA  Inject 1 mL (150 mg total) into the muscle every 3 (three) months.            Indwelling Lines/Drains at time of discharge:   Lines/Drains/Airways     Epidural Line  Duration                Epidural Catheter 02/24/21 399 days                Time spent on the discharge of patient: 45 minutes    Critical care time spent on the evaluation and treatment of severe organ dysfunction, review of pertinent labs and imaging studies, discussions with consulting providers and discussions with patient/family: 45 minutes.     Reno Forte MD  Department of Hospital Medicine  [facility] - Intensive Care

## 2022-03-30 NOTE — NURSING
0700 - Report given to EDMUNDO Higuera.  Patient still on Cardizem drip running at 10 cc/hr. B/P 97/73, , RR 16, 02 97%. No distress observed during the shift. Patient observed to have periods of sleep apneas while asleep at night. Patient EKG rhythm still Afib. Patient remains NPO since midnight. Plan for cardioversion still in place for today. No BM for the shift. Mood was calm and she was cooperative.  Monitoring continues.

## 2022-03-30 NOTE — NURSING
Patient back from procedure. Cardizem drip discontinued. Normal Saline infusing at 100 ml/hr to left hand. Telemetry Sinus Rhythm HR 85. Oxygen saturation at 93% on room air, RR 19. Patient slightly drowsy awakens easily denies any chest discomfort, SOB, difficulty breathing.

## 2022-03-30 NOTE — NURSING
0800 Dr. Alonso present to see patient. New orders noted. Patient still on Cardizem at 10 mg/ hr infusing to left hand. Patient denies chest pain or shortness of breath.

## 2022-03-30 NOTE — EICU
Rounding (Video Assessment):  Yes    Intervention Initiated From:  [facility] / EICU    Chivo Communicated with Bedside Nurse regarding:  Other    Nurse Notified:  No    Doctor Notified:  No    Comments: video assessment complete.

## 2022-03-30 NOTE — ANESTHESIA POSTPROCEDURE EVALUATION
Anesthesia Post Evaluation    Patient: Joseph Pepper    Procedure(s) Performed: Procedure(s) (LRB):  TRANSESOPHAGEAL ECHOCARDIOGRAM WITH POSSIBLE CARDIOVERSION (TIA W/ POSS CARDIOVERSION) (N/A)    Final Anesthesia Type: general      Patient location during evaluation: PACU  Patient participation: Yes- Able to Participate  Level of consciousness: awake and alert, oriented and awake  Post-procedure vital signs: reviewed and stable  Pain management: adequate  Airway patency: patent    PONV status at discharge: No PONV  Anesthetic complications: no      Cardiovascular status: blood pressure returned to baseline  Respiratory status: unassisted, spontaneous ventilation and room air  Hydration status: euvolemic  Follow-up not needed.  Comments: [facility]          Vitals Value Taken Time   /73 03/30/22 1240   Temp 36.4 °C (97.6 °F) 03/30/22 0800   Pulse 82 03/30/22 1240   Resp 18 03/30/22 1240   SpO2 97 % 03/30/22 1240         Event Time   Out of Recovery 03/30/2022 12:41:36         Pain/Mike Score: Mike Score: 10 (3/30/2022 12:33 PM)

## 2022-03-30 NOTE — PLAN OF CARE
[facility] - Intensive Care  Initial Discharge Assessment       Primary Care Provider: Jesusita Avery NP    Admission Diagnosis: Atrial fibrillation with RVR [I48.91]  COVID-19 virus not detected [Z20.822]    Admission Date: 3/28/2022  Expected Discharge Date:     Discharge Barriers Identified: None    Payor: MEDICAID / Plan: [place] [employer] / Product Type: Managed Medicaid /     Extended Emergency Contact Information  Primary Emergency Contact: Sanches,Martha  Mobile Phone: [phone]  Relation: Mother  Secondary Emergency Contact: ismael burgos  Mobile Phone: [phone]  Relation: Spouse  Preferred language: English   needed? No    Discharge Plan A: Home  Discharge Plan B: Home with family      [facility] [address] - [address]  [address]  [place] [address]  Phone: [phone] Fax: [phone]      Initial Assessment (most recent)     Adult Discharge Assessment - 03/30/22 0808        Discharge Assessment    Assessment Type Discharge Planning Assessment     Communicated CELE with patient/caregiver Date not available/Unable to determine     Reason For Admission AFib RVR     Facility Arrived From: Home     Do you expect to return to your current living situation? Yes     Prior to hospitilization cognitive status: Alert/Oriented     Current cognitive status: Alert/Oriented     Walking or Climbing Stairs Difficulty none     Dressing/Bathing Difficulty none     Equipment Currently Used at Home none     Readmission within 30 days? No     Patient currently being followed by outpatient case management? No     Do you currently have service(s) that help you manage your care at home? No     Do you have prescription coverage? Yes     Coverage [place] [employer]     How do you get to doctors appointments? car, drives self     Are you on dialysis? No     Do you take coumadin? No     Discharge Plan A Home     Discharge Plan B Home with family     DME Needed Upon Discharge   none     Discharge Barriers Identified None                    Discharge assessment completed. No post-acute care needs identified at this time. SW to remain available.

## 2022-03-30 NOTE — PLAN OF CARE
Patient remains in normal sinus rhythm. Denies chest discomfort, SOB or difficulty breathing. Discharged home.

## 2022-03-30 NOTE — NURSING
Discharge instructions reviewed with patient and her . IV discontinued with catheter intact. Patient discharged home per wheelchair.

## 2022-03-30 NOTE — EICU
Rounding (Video Assessment):  Yes  Comments: Video rounds completed.  Sitting up in bed, talkative, nad noted, VSS (see flowsheet).  Diltiazem IV drip in progress @ 10 mg/hr.

## 2022-03-30 NOTE — NURSING
6075 - Report received from EDMUNDO Vinson.   Patient lying in bed asleep. B/P 98/72, HR 71, RR 16, 02 93%, 97.2. Patient breathing freely on room air. No distress observed. Cardizem drip running at 10 mg/hr. Monitoring continues.

## 2022-03-30 NOTE — ASSESSMENT & PLAN NOTE
New onset  CIS consulted  On dilt gtt, wean  PO metoprolol started per CIS  lovenox for anticoagulatoin (? Cardioversion tomorrow)  TTE  TSH noramal  Replete electrolytes PRN, AM labs ordered; Mg pending today    Patient will be discharged today  Continue metoprolol 100 mg p.o. b.i.d.  Start patient on Eliquis 5 mg p.o. b.i.d.  Discontinue flecainide  Follow up with Cardiology in 1 week

## 2022-03-30 NOTE — ANESTHESIA PREPROCEDURE EVALUATION
03/30/2022  Joseph Pepper is a 26 y.o., female.      Pre-op Assessment    I have reviewed the Patient Summary Reports.     I have reviewed the Nursing Notes.    I have reviewed the Medications.     Review of Systems  Anesthesia Hx:  No problems with previous Anesthesia    Social:  Non-Smoker, No Alcohol Use    Hematology/Oncology:  Hematology Normal   Oncology Normal     EENT/Dental:EENT/Dental Normal   Cardiovascular:  Cardiovascular Normal Exercise tolerance: good     Pulmonary:  Pulmonary Normal    Renal/:  Renal/ Normal     Hepatic/GI:  Hepatic/GI Normal    Musculoskeletal:  Musculoskeletal Normal    Neurological:  Neurology Normal    Endocrine:  Endocrine Normal  Morbid Obesity / BMI > 40  Dermatological:  Skin Normal    Psych:  Psychiatric Normal           Physical Exam  General: Well nourished    Airway:  Mallampati: II   Mouth Opening: Normal  TM Distance: Normal  Tongue: Normal  Neck ROM: Normal ROM    Dental:  Intact        Anesthesia Plan  Type of Anesthesia, risks & benefits discussed:    Anesthesia Type: Gen Natural Airway  Intra-op Monitoring Plan: Standard ASA Monitors  Post Op Pain Control Plan: multimodal analgesia  Induction:  IV  Informed Consent: Patient consented to blood products? No  ASA Score: 2  Day of Surgery Review of History & Physical: H&P Update referred to the surgeon/provider.I have interviewed and examined the patient. I have reviewed the patient's H&P dated: 3/30/22. There are no significant changes.     Ready For Surgery From Anesthesia Perspective.     .

## 2022-03-30 NOTE — PROGRESS NOTES
[facility] Medicine  Progress Note    Patient Name: Joseph Pepper  MRN: [id number]  Patient Class: IP- Inpatient   Admission Date: 3/28/2022  Length of Stay: 2 days  Attending Physician: Jose Wood MD  Primary Care Provider: Jesusita Avery NP        Subjective:     Principal Problem:Atrial fibrillation with RVR    HPI:  Patient presented to ER with 1 day h/o palpitations. She felt like her heart was racing. Caused pressure in center of chest. No sob, dizziness, syncope, diaphoresis. She has h/o gestational diabetes but no active DM, HTN, HLD, etc. + obesity. She denies taking prescription or OTC medications. Denies excess caffeine use. Denies EtOH, tobacco or illicit drug use. EKG showed a-fib with RVR. On dilt gtt in ICU now; HR remains  on average in a-fib. Still getting intermittent chest pressure. Trop negative. CIS following.       Overview/Hospital Course:  Patient still in atrial fib.  Her heart rate is well controlled on Lopressor 100 mg twice daily and Cardizem drip.  Patient denies shortness of breath or chest pain.  She did have witnessed obstructive sleep apnea with oxygen desaturation into the 80% region.  Cardiology will attempt to cardiovert her with flecainide and digoxin.  She is still on Lovenox.  They may consider cardioversion today.  Otherwise patient is doing well.  She has no complaints.      History reviewed. No pertinent past medical history.    Past Surgical History:   Procedure Laterality Date     SECTION N/A 2021    Procedure: PRIMARY  SECTION;  Surgeon: Mehran Fish MD;  Location: [facility];  Service: OB/GYN;  Laterality: N/A;    COLONOSCOPY      ESOPHAGOSCOPY      TONSILLECTOMY, ADENOIDECTOMY, BILATERAL MYRINGOTOMY AND TUBES      WISDOM TOOTH EXTRACTION         Review of patient's allergies indicates:   Allergen Reactions    Allergenic extract-food-shrimp Edema    Pollen extracts      General allergic  rhinitis        No current facility-administered medications on file prior to encounter.     Current Outpatient Medications on File Prior to Encounter   Medication Sig    docusate sodium (COLACE) 100 MG capsule Take 100 mg by mouth 2 (two) times daily.    famotidine (PEPCID) 20 MG tablet Take 20 mg by mouth 2 (two) times daily.    medroxyPROGESTERone (DEPO-PROVERA) 150 mg/mL injection Inject 1 mL (150 mg total) into the muscle every 3 (three) months.     Family History    None       Tobacco Use    Smoking status: Never Smoker    Smokeless tobacco: Never Used   Substance and Sexual Activity    Alcohol use: Not Currently     Comment: occ    Drug use: Never    Sexual activity: Yes     Partners: Male     Birth control/protection: Injection     Comment:      Review of Systems   Constitutional:  Negative for activity change, fatigue, fever and unexpected weight change.   HENT:  Negative for congestion, ear pain, hearing loss, rhinorrhea, sore throat and tinnitus.    Eyes:  Negative for pain, redness and visual disturbance.   Respiratory:  Positive for chest tightness. Negative for cough, shortness of breath and wheezing.    Cardiovascular:  Positive for palpitations. Negative for chest pain and leg swelling.   Gastrointestinal:  Negative for abdominal pain, blood in stool, constipation, diarrhea, nausea and vomiting.   Genitourinary:  Negative for dysuria, frequency, pelvic pain and urgency.   Musculoskeletal:  Negative for back pain, joint swelling and neck pain.   Skin:  Negative for color change, rash and wound.   Neurological:  Negative for dizziness, tremors, weakness, light-headedness and headaches.   Objective:     Vital Signs (Most Recent):  Temp: 97.6 °F (36.4 °C) (03/30/22 0800)  Pulse: 107 (03/30/22 0800)  Resp: 15 (03/30/22 0800)  BP: 100/84 (03/30/22 0800)  SpO2: 98 % (03/30/22 0800)   Vital Signs (24h Range):  Temp:  [97.2 °F (36.2 °C)-98.7 °F (37.1 °C)] 97.6 °F (36.4 °C)  Pulse:  []  107  Resp:  [13-31] 15  SpO2:  [92 %-98 %] 98 %  BP: ()/(65-87) 100/84     Weight: 128.9 kg (284 lb 2.8 oz)  Body mass index is 44.51 kg/m².    Physical Exam  Vitals and nursing note reviewed.   Constitutional:       General: She is not in acute distress.     Appearance: She is well-developed. She is obese.   HENT:      Head: Normocephalic and atraumatic.      Right Ear: External ear normal.      Left Ear: External ear normal.      Nose: Nose normal.   Eyes:      General:         Right eye: No discharge.         Left eye: No discharge.      Extraocular Movements: Extraocular movements intact.      Conjunctiva/sclera: Conjunctivae normal.      Pupils: Pupils are equal, round, and reactive to light.   Neck:      Thyroid: No thyromegaly.   Cardiovascular:      Rate and Rhythm: Tachycardia present. Rhythm irregular.      Heart sounds: No murmur heard.    No gallop.   Pulmonary:      Effort: Pulmonary effort is normal. No respiratory distress.      Breath sounds: Normal breath sounds. No wheezing or rales.   Abdominal:      General: Bowel sounds are normal. There is no distension.      Palpations: Abdomen is soft.      Tenderness: There is no abdominal tenderness.   Skin:     General: Skin is warm and dry.   Neurological:      Mental Status: She is alert and oriented to person, place, and time.      Cranial Nerves: No cranial nerve deficit.   Psychiatric:         Behavior: Behavior normal.         Thought Content: Thought content normal.         CRANIAL NERVES     CN III, IV, VI   Pupils are equal, round, and reactive to light.     Significant Labs: All pertinent labs within the past 24 hours have been reviewed.  CBC:   Recent Labs   Lab 03/28/22 2025 03/29/22  0146   WBC 12.51 10.29   HGB 14.9 14.1   HCT 47.1 43.7    327       CMP:   Recent Labs   Lab 03/28/22 2025 03/29/22  0414 03/30/22  0408    139 137   K 3.9 3.9 4.2    109 106   CO2 24 21* 20*   GLU 95 103 103   BUN 15 11 11   CREATININE  0.8 0.7 0.7   CALCIUM 10.1 8.7 9.0   PROT 8.3 6.9 7.4   ALBUMIN 3.9 3.4* 3.6   BILITOT 0.1 0.2 0.4   ALKPHOS 100 84 87   AST 14 10 11   ALT 15 14 16   ANIONGAP 12 9 11   EGFRNONAA >60 >60 >60       Cardiac Markers:   Recent Labs   Lab 03/28/22 2025   BNP 28       Troponin:   Recent Labs   Lab 03/28/22 2025 03/29/22  0146 03/29/22  0723   TROPONINI <0.006 0.007 <0.006       TSH:   Recent Labs   Lab 03/28/22 2025   TSH 3.871         Significant Imaging: I have reviewed all pertinent imaging results/findings within the past 24 hours.      Assessment/Plan:      * Atrial fibrillation with RVR  New onset  CIS consulted  On dilt gtt, wean  PO metoprolol started per CIS  lovenox for anticoagulatoin (? Cardioversion tomorrow)  TTE  TSH noramal  Replete electrolytes PRN, AM labs ordered; Mg pending today  IVF      Obstructive sleep apnea  Patient will need a sleep study as an outpatient.      Anxiety  Patient very anxious this AM and admits to h/o anxiety but not on SSRI  Agreeable to starting zoloft  PRN Xanax ordered      BMI 40.0-44.9, adult  Diet, exercise, weight loss        VTE Risk Mitigation (From admission, onward)         Ordered     enoxaparin injection 120 mg  Every 12 hours (non-standard times)         03/29/22 0118                Discharge Planning   CELE:      Code Status: Full Code   Is the patient medically ready for discharge?:     Reason for patient still in hospital (select all that apply): Patient trending condition  Discharge Plan A: Home            Critical care time spent on the evaluation and treatment of severe organ dysfunction, review of pertinent labs and imaging studies, discussions with consulting providers and discussions with patient/family: 30 minutes.      Reno Forte MD  Department of Hospital Medicine   [place] - Intensive Care

## 2022-03-30 NOTE — BRIEF OP NOTE
Procedure: TIA  Dx:  AF  : Alex Alonso  Anesthesia: CRNA  See details in Cup[facility]    Procedure: TIA probe inserted via bite guard without any difficulty. Standard views were obtained at usual levels. Probe was removed without any complications. Well tolerated.  Findings are as follows:  LVEF 55%  LA appendage without thrombus.  No significant valvular disease  No shunt    Pt converted to SR during TAI without need for DCCV.    Pt returned to CCU in stable condition.

## 2022-03-30 NOTE — NURSING
Patient transferred to procedure area for cardioversion. Remains in Afib Patient placed on cardiac monitor with Cardizem infusing at 10 ml/hr. No complaints of shortness of breath, difficulty breathing or chest discomfort. Patent's  present.

## 2022-03-30 NOTE — TRANSFER OF CARE
"Anesthesia Transfer of Care Note    Patient: Joseph Pepper    Procedure(s) Performed: Procedure(s) (LRB):  TRANSESOPHAGEAL ECHOCARDIOGRAM WITH POSSIBLE CARDIOVERSION (TIA W/ POSS CARDIOVERSION) (N/A)    Patient location: PACU    Anesthesia Type: general    Transport from OR: Transported from OR on 6-10 L/min O2 by face mask with adequate spontaneous ventilation    Post pain: adequate analgesia    Post assessment: no apparent anesthetic complications and tolerated procedure well    Post vital signs: stable    Level of consciousness: sedated    Nausea/Vomiting: no nausea/vomiting    Complications: none    Transfer of care protocol was followed      Last vitals:   Visit Vitals  /68 (BP Location: Left arm, Patient Position: Lying)   Pulse 65   Temp 36.4 °C (97.6 °F) (Skin)   Resp 19   Ht 5' 7" (1.702 m)   Wt 128.9 kg (284 lb 2.8 oz)   SpO2 99%   Breastfeeding No   BMI 44.51 kg/m²     "

## 2022-03-30 NOTE — SUBJECTIVE & OBJECTIVE
History reviewed. No pertinent past medical history.    Past Surgical History:   Procedure Laterality Date     SECTION N/A 2021    Procedure: PRIMARY  SECTION;  Surgeon: Mehran Fish MD;  Location: [facility] OR;  Service: OB/GYN;  Laterality: N/A;    COLONOSCOPY      ESOPHAGOSCOPY      TONSILLECTOMY, ADENOIDECTOMY, BILATERAL MYRINGOTOMY AND TUBES      WISDOM TOOTH EXTRACTION         Review of patient's allergies indicates:   Allergen Reactions    Allergenic extract-food-shrimp Edema    Pollen extracts      General allergic rhinitis        No current facility-administered medications on file prior to encounter.     Current Outpatient Medications on File Prior to Encounter   Medication Sig    docusate sodium (COLACE) 100 MG capsule Take 100 mg by mouth 2 (two) times daily.    famotidine (PEPCID) 20 MG tablet Take 20 mg by mouth 2 (two) times daily.    medroxyPROGESTERone (DEPO-PROVERA) 150 mg/mL injection Inject 1 mL (150 mg total) into the muscle every 3 (three) months.     Family History    None       Tobacco Use    Smoking status: Never Smoker    Smokeless tobacco: Never Used   Substance and Sexual Activity    Alcohol use: Not Currently     Comment: occ    Drug use: Never    Sexual activity: Yes     Partners: Male     Birth control/protection: Injection     Comment:      Review of Systems   Constitutional:  Negative for activity change, fatigue, fever and unexpected weight change.   HENT:  Negative for congestion, ear pain, hearing loss, rhinorrhea, sore throat and tinnitus.    Eyes:  Negative for pain, redness and visual disturbance.   Respiratory:  Positive for chest tightness. Negative for cough, shortness of breath and wheezing.    Cardiovascular:  Positive for palpitations. Negative for chest pain and leg swelling.   Gastrointestinal:  Negative for abdominal pain, blood in stool, constipation, diarrhea, nausea and vomiting.   Genitourinary:  Negative for dysuria, frequency, pelvic  pain and urgency.   Musculoskeletal:  Negative for back pain, joint swelling and neck pain.   Skin:  Negative for color change, rash and wound.   Neurological:  Negative for dizziness, tremors, weakness, light-headedness and headaches.   Objective:     Vital Signs (Most Recent):  Temp: 97.6 °F (36.4 °C) (03/30/22 0800)  Pulse: 107 (03/30/22 0800)  Resp: 15 (03/30/22 0800)  BP: 100/84 (03/30/22 0800)  SpO2: 98 % (03/30/22 0800)   Vital Signs (24h Range):  Temp:  [97.2 °F (36.2 °C)-98.7 °F (37.1 °C)] 97.6 °F (36.4 °C)  Pulse:  [] 107  Resp:  [13-31] 15  SpO2:  [92 %-98 %] 98 %  BP: ()/(65-87) 100/84     Weight: 128.9 kg (284 lb 2.8 oz)  Body mass index is 44.51 kg/m².    Physical Exam  Vitals and nursing note reviewed.   Constitutional:       General: She is not in acute distress.     Appearance: She is well-developed. She is obese.   HENT:      Head: Normocephalic and atraumatic.      Right Ear: External ear normal.      Left Ear: External ear normal.      Nose: Nose normal.   Eyes:      General:         Right eye: No discharge.         Left eye: No discharge.      Extraocular Movements: Extraocular movements intact.      Conjunctiva/sclera: Conjunctivae normal.      Pupils: Pupils are equal, round, and reactive to light.   Neck:      Thyroid: No thyromegaly.   Cardiovascular:      Rate and Rhythm: Tachycardia present. Rhythm irregular.      Heart sounds: No murmur heard.    No gallop.   Pulmonary:      Effort: Pulmonary effort is normal. No respiratory distress.      Breath sounds: Normal breath sounds. No wheezing or rales.   Abdominal:      General: Bowel sounds are normal. There is no distension.      Palpations: Abdomen is soft.      Tenderness: There is no abdominal tenderness.   Skin:     General: Skin is warm and dry.   Neurological:      Mental Status: She is alert and oriented to person, place, and time.      Cranial Nerves: No cranial nerve deficit.   Psychiatric:         Behavior: Behavior  normal.         Thought Content: Thought content normal.         CRANIAL NERVES     CN III, IV, VI   Pupils are equal, round, and reactive to light.     Significant Labs: All pertinent labs within the past 24 hours have been reviewed.  CBC:   Recent Labs   Lab 03/28/22 2025 03/29/22  0146   WBC 12.51 10.29   HGB 14.9 14.1   HCT 47.1 43.7    327       CMP:   Recent Labs   Lab 03/28/22 2025 03/29/22  0414 03/30/22  0408    139 137   K 3.9 3.9 4.2    109 106   CO2 24 21* 20*   GLU 95 103 103   BUN 15 11 11   CREATININE 0.8 0.7 0.7   CALCIUM 10.1 8.7 9.0   PROT 8.3 6.9 7.4   ALBUMIN 3.9 3.4* 3.6   BILITOT 0.1 0.2 0.4   ALKPHOS 100 84 87   AST 14 10 11   ALT 15 14 16   ANIONGAP 12 9 11   EGFRNONAA >60 >60 >60       Cardiac Markers:   Recent Labs   Lab 03/28/22 2025   BNP 28       Troponin:   Recent Labs   Lab 03/28/22 2025 03/29/22  0146 03/29/22  0723   TROPONINI <0.006 0.007 <0.006       TSH:   Recent Labs   Lab 03/28/22 2025   TSH 3.871         Significant Imaging: I have reviewed all pertinent imaging results/findings within the past 24 hours.

## 2022-03-30 NOTE — HOSPITAL COURSE
Patient still in atrial fib.  Her heart rate is well controlled on Lopressor 100 mg twice daily and Cardizem drip.  Patient denies shortness of breath or chest pain.  She did have witnessed obstructive sleep apnea with oxygen desaturation into the 80% region.  Cardiology will attempt to cardiovert her with flecainide and digoxin.  She is still on Lovenox.  They may consider cardioversion today.  Otherwise patient is doing well.  She has no complaints.    Patient was brought to the endoscopy suite for a TIA procedure.  While there she converted to normal sinus rhythm.  She is feeling well and Cardiology feels she can now be discharged home with close follow-up.

## 2022-03-30 NOTE — PROGRESS NOTES
[facility] - Intensive Care  Cardiology  Progress Note    Patient Name: Joseph Pepper  MRN: [id number]  Admission Date: 3/28/2022  Hospital Length of Stay: 2 days  Code Status: Full Code   Attending Physician: Jose Wood MD   Primary Care Physician: Jesusita Avery NP  Expected Discharge Date:   Principal Problem:Atrial fibrillation with RVR    Subjective:     Chief Complaint:  CP, palpitations      HPI:   26 year old female with no significant medical history presents with c/o palpitations and nonexertional chest pain. Noted to be in new onset atrial fibrillation with RVR. CIS consulted for management.    ROS Constitutional: Negative.   HENT: Negative.     Eyes: Negative.    Cardiovascular:  Positive for chest pain, irregular heartbeat and palpitations. Negative for claudication, cyanosis, dyspnea on exertion, leg swelling, near-syncope, orthopnea and paroxysmal nocturnal dyspnea.   Respiratory: Negative.     Endocrine: Negative.    Hematologic/Lymphatic: Negative.    Skin: Negative.    Musculoskeletal: Negative.    Gastrointestinal: Negative.    Genitourinary: Negative.    Neurological: Negative.    Psychiatric/Behavioral: Negative.     Allergic/Immunologic: Negative.       Objective:     Vital Signs (Most Recent):  Temp: 97.6 °F (36.4 °C) (03/30/22 0715)  Pulse: 100 (03/30/22 0500)  Resp: 14 (03/30/22 0500)  BP: 100/84 (03/30/22 0756)  SpO2: 97 % (03/30/22 0500) Vital Signs (24h Range):  Temp:  [97.2 °F (36.2 °C)-98.7 °F (37.1 °C)] 97.6 °F (36.4 °C)  Pulse:  [] 100  Resp:  [13-31] 14  SpO2:  [92 %-98 %] 97 %  BP: ()/(65-87) 100/84     Weight: 128.9 kg (284 lb 2.8 oz)  Body mass index is 44.51 kg/m².    SpO2: 97 %  O2 Device (Oxygen Therapy): room air      Intake/Output Summary (Last 24 hours) at 3/30/2022 0802  Last data filed at 3/30/2022 0600  Gross per 24 hour   Intake 260 ml   Output 1800 ml   Net -1540 ml       Lines/Drains/Airways     Epidural Line  Duration                 Epidural Catheter 02/24/21 398 days          Peripheral Intravenous Line  Duration                Peripheral IV - Single Lumen 03/29/22 1525 20 G Right;Anterior Antecubital <1 day         Peripheral IV - Single Lumen 03/29/22 1528 20 G Left;Posterior Hand <1 day                Physical Exam  Constitutional:       General: She is not in acute distress.     Appearance: She is obese. She is not ill-appearing or toxic-appearing.   Cardiovascular:      Rate and Rhythm: Tachycardia present. Rhythm irregular.      Pulses: Normal pulses.      Heart sounds: Normal heart sounds. No murmur heard.    No friction rub. No gallop.   Pulmonary:      Effort: Pulmonary effort is normal. No respiratory distress.      Breath sounds: Normal breath sounds. No stridor. No wheezing, rhonchi or rales.   Chest:      Chest wall: No tenderness.   Abdominal:      General: Abdomen is flat. Bowel sounds are normal.      Palpations: Abdomen is soft.   Musculoskeletal:         General: Normal range of motion.      Cervical back: Normal range of motion and neck supple.      Right lower leg: No edema.      Left lower leg: No edema.   Skin:     General: Skin is warm and dry.      Capillary Refill: Capillary refill takes less than 2 seconds.   Neurological:      General: No focal deficit present.      Mental Status: She is alert and oriented to person, place, and time. Mental status is at baseline.   Psychiatric:         Mood and Affect: Mood normal.         Behavior: Behavior normal.         Thought Content: Thought content normal.         Judgment: Judgment normal.    Significant Labs:   Recent Lab Results       03/30/22  0408   03/29/22  1031        Albumin 3.6         Alkaline Phosphatase 87         ALT 16         Anion Gap 11         Ao root annulus   2.81       Ao peak timmy   1.07       Ao VTI   21.56       AST 11         AV valve area   2.39       AV mean gradient   3       AV index (prosthetic)   0.60       AV peak gradient   5       AV Velocity  Ratio   0.70       BILIRUBIN TOTAL 0.4  Comment: For infants and newborns, interpretation of results should be based  on gestational age, weight and in agreement with clinical  observations.    Premature Infant recommended reference ranges:  Up to 24 hours.............<8.0 mg/dL  Up to 48 hours............<12.0 mg/dL  3-5 days..................<15.0 mg/dL  6-29 days.................<15.0 mg/dL           BSA   2.42       BUN 11         Calcium 9.0         Chloride 106         CO2 20         Creatinine 0.7         Left Ventricle Relative Wall Thickness   0.44       E/A ratio   2.97       eGFR if  >60         eGFR if non  >60  Comment: Calculation used to obtain the estimated glomerular filtration  rate (eGFR) is the CKD-EPI equation.            EF   55       E wave deceleration time   217.01       FS   32       Glucose 103         IVRT   138.41       IVS   0.99       LA WIDTH   3.57       Left Atrium Major Axis   5.09       Left Atrium Minor Axis   4.78       LA size   3.33       LA volume   49.82       LA Volume Index   21.2       LVOT area   4.0       LV Diastolic Volume   96.46       LV Diastolic Volume Index   41.05       LVIDd   4.58       LVIDs   3.13       LV mass   157.70       LV Mass Index   67       LVOT diameter   2.25       LVOT peak sunday   0.75       LVOT stroke volume   51.62       LVOT peak VTI   12.99       LV Systolic Volume   38.80       LV Systolic Volume Index   16.5       MV valve area p 1/2 method   3.50       MV Peak A Sunday   0.38       MV Peak E Sunday   1.13       MV stenosis pressure 1/2 time   62.93       Potassium 4.2         PROTEIN TOTAL 7.4         PV mean gradient   0.61       PV peak gradient   0.96       PV PEAK VELOCITY   0.63       Posterior Wall   1.01       RA Major Axis   3.91       RVDD   2.70       RVOT peak sunday   0.49       RVOT peak VTI   8.81       Sodium 137         STJ   2.91       Triscuspid Valve Regurgitation Peak Gradient   18       TR Max  Sunday   2.11             Significant Imaging: CT scan: CT ABDOMEN PELVIS WITH CONTRAST: No results found for this visit on 03/28/22. and CT ABDOMEN PELVIS WITHOUT CONTRAST: No results found for this visit on 03/28/22., Echocardiogram:   2D echo with color flow doppler: No results found for this or any previous visit. and Transthoracic echo (TTE) complete (Cupid Only):   Results for orders placed or performed during the hospital encounter of 03/28/22   Echo   Result Value Ref Range    BSA 2.42 m2    LA WIDTH 3.57 cm    EF 55 %    PV PEAK VELOCITY 0.63 cm/s    LVIDd 4.58 3.5 - 6.0 cm    IVS 0.99 0.6 - 1.1 cm    Posterior Wall 1.01 0.6 - 1.1 cm    Ao root annulus 2.81 cm    LVIDs 3.13 2.1 - 4.0 cm    FS 32 28 - 44 %    LA volume 49.82 cm3    STJ 2.91 cm    LV mass 157.70 g    LA size 3.33 cm    RVDD 2.70 cm    Left Ventricle Relative Wall Thickness 0.44 cm    AV mean gradient 3 mmHg    AV valve area 2.39 cm2    AV Velocity Ratio 0.70     AV index (prosthetic) 0.60     MV valve area p 1/2 method 3.50 cm2    PV peak gradient 0.96 mmHg    E/A ratio 2.97     E wave deceleration time 217.01 msec    IVRT 138.41 msec    LVOT diameter 2.25 cm    LVOT area 4.0 cm2    LVOT peak sunday 0.75 m/s    LVOT peak VTI 12.99 cm    Ao peak sunday 1.07 m/s    Ao VTI 21.56 cm    RVOT peak sunday 0.49 m/s    RVOT peak VTI 8.81 cm    LVOT stroke volume 51.62 cm3    AV peak gradient 5 mmHg    PV mean gradient 0.61 mmHg    MV Peak E Sunday 1.13 m/s    TR Max Sunday 2.11 m/s    MV stenosis pressure 1/2 time 62.93 ms    MV Peak A Sunday 0.38 m/s    LV Systolic Volume 38.80 mL    LV Systolic Volume Index 16.5 mL/m2    LV Diastolic Volume 96.46 mL    LV Diastolic Volume Index 41.05 mL/m2    LA Volume Index 21.2 mL/m2    LV Mass Index 67 g/m2    RA Major Axis 3.91 cm    Left Atrium Minor Axis 4.78 cm    Left Atrium Major Axis 5.09 cm    Triscuspid Valve Regurgitation Peak Gradient 18 mmHg    Narrative    · Concentric remodeling and  · The estimated ejection fraction is  55%.  · Atrial fibrillation observed.  · With normal left atrial pressure.  · Normal right ventricular size with normal right ventricular systolic   function.       and X-Ray: CXR: X-Ray Chest 1 View (CXR): No results found for this visit on 03/28/22. and X-Ray Chest PA and Lateral (CXR): No results found for this visit on 03/28/22. and KUB: X-Ray Abdomen AP 1 View (KUB): No results found for this visit on 03/28/22.  Assessment and Plan:       Active Diagnoses:    Diagnosis Date Noted POA    PRINCIPAL PROBLEM:  Atrial fibrillation with RVR [I48.91] 03/29/2022 Yes    Anxiety [F41.9] 03/29/2022 Yes    BMI 40.0-44.9, adult [Z68.41] 10/26/2020 Not Applicable      Problems Resolved During this Admission:       VTE Risk Mitigation (From admission, onward)         Ordered     enoxaparin injection 120 mg  Every 12 hours (non-standard times)         03/29/22 0118              Current Facility-Administered Medications   Medication    ALPRAZolam tablet 0.25 mg    diltiaZEM 125 mg in dextrose 5% 125 mL infusion (non-titrating)    enoxaparin injection 120 mg    famotidine (PF) injection 20 mg    flecainide tablet 50 mg    metoprolol tartrate (LOPRESSOR) tablet 100 mg    mupirocin 2 % ointment    ondansetron injection 4 mg    sertraline tablet 25 mg    sodium chloride 0.9% flush 10 mL    sodium chloride 0.9% flush 10 mL          DX: New onset paroxysmal atrial fibrillation w/ RVR probably since Monday  Morbid obesity with h/o gestational DM  Non smoker, No prior HTN  No BELKIS by Hx  FHX strong for CAD     Plan: Echo shows LVEF 60%  metoprolol 100 mg PO BID  Continue Lovenox and iv Cardizem until rate controlled  Replete potassium for a goal of 4.0  TIA CV today , preferably in ICU for better uninterrupted care  Try Flecainide this morning .   Maintain telemetry  Labs and EKG      Thank you for your consult. I will follow-up with patient. Please contact us if you have any additional questions.     Transcribed by  Bradley  AMMON Roy for Dr nacho Alonso  Cardiology   [facility]  I attest that I have personally seen and examined this patient. I have reviewed and discussed the management in detail as outlined above.  Bradley Roy NP  Cardiology  [facility]  I attest that I have personally seen and examined this patient. I have reviewed and discussed the management in detail as outlined above.

## 2022-03-30 NOTE — ASSESSMENT & PLAN NOTE
Patient very anxious this AM and admits to h/o anxiety but not on SSRI  Agreeable to starting zoloft  PRN Xanax ordered  Follow-up with PCP in 2 days

## 2022-03-31 NOTE — PLAN OF CARE
[place] - Intensive Care  Discharge Final Note    Primary Care Provider: Jesusita Avery NP    Expected Discharge Date: 3/30/2022    Final Discharge Note (most recent)     Final Note - 03/31/22 1041        Final Note    Assessment Type Final Discharge Note     Anticipated Discharge Disposition Home or Self Care     What phone number can be called within the next 1-3 days to see how you are doing after discharge? [phone]        Post-Acute Status    Post-Acute Authorization Other     Other Status No Post-Acute Service Needs     Discharge Delays None known at this time                 Important Message from Medicare             Contact Info     Jesusita Avery NP   Specialty: Family Medicine   Relationship: PCP - General    [place] [facility]  [address]  [place]  [place] [address]   Phone: [phone]       Next Steps: Follow up in 2 day(s)    Alex Alonso MD   Specialty: Cardiology    [address] [place] [address] [address] [address]   Phone: [phone]       Next Steps: Go to    Instructions: follow up with Dr. Alonso on 4/7/22 at 1:50pm          Patient discharged after hours and case management unaware; therefore, a hospital follow-up with PCP was not scheduled.

## 2022-05-05 ENCOUNTER — CLINICAL SUPPORT (OUTPATIENT)
Dept: OBSTETRICS AND GYNECOLOGY | Facility: CLINIC | Age: 27
End: 2022-05-05
Payer: MEDICAID

## 2022-05-05 DIAGNOSIS — Z30.42 ENCOUNTER FOR DEPO-PROVERA CONTRACEPTION: ICD-10-CM

## 2022-05-05 DIAGNOSIS — Z30.42 ENCOUNTER FOR DEPO-PROVERA CONTRACEPTION: Primary | ICD-10-CM

## 2022-05-05 LAB
B-HCG UR QL: NEGATIVE
CTP QC/QA: YES

## 2022-05-05 PROCEDURE — 81025 URINE PREGNANCY TEST: CPT | Mod: PBBFAC

## 2022-05-05 RX ORDER — MEDROXYPROGESTERONE ACETATE 150 MG/ML
150 INJECTION, SUSPENSION INTRAMUSCULAR
Qty: 1 ML | Refills: 1 | OUTPATIENT
Start: 2022-05-05 | End: 2022-10-04

## 2022-05-05 NOTE — PROGRESS NOTES
Medroxyprogesterone 150 mg / 1ml injection given in the left hip at 10:28 am on 05/05/2021 LOT [id number] EXP 11/23 negative upt 05/05/2022 next window is 07/21/2022 to 08/04/2022  Emily River PharmD

## 2022-10-04 ENCOUNTER — OFFICE VISIT (OUTPATIENT)
Dept: OBSTETRICS AND GYNECOLOGY | Facility: CLINIC | Age: 27
End: 2022-10-04
Payer: MEDICAID

## 2022-10-04 VITALS
HEIGHT: 67 IN | DIASTOLIC BLOOD PRESSURE: 82 MMHG | WEIGHT: 292.81 LBS | BODY MASS INDEX: 45.96 KG/M2 | SYSTOLIC BLOOD PRESSURE: 124 MMHG | RESPIRATION RATE: 16 BRPM | HEART RATE: 107 BPM

## 2022-10-04 DIAGNOSIS — Z01.419 WELL WOMAN EXAM WITH ROUTINE GYNECOLOGICAL EXAM: Primary | ICD-10-CM

## 2022-10-04 PROCEDURE — 3074F PR MOST RECENT SYSTOLIC BLOOD PRESSURE < 130 MM HG: ICD-10-PCS | Mod: CPTII,,, | Performed by: OBSTETRICS & GYNECOLOGY

## 2022-10-04 PROCEDURE — 3008F PR BODY MASS INDEX (BMI) DOCUMENTED: ICD-10-PCS | Mod: CPTII,,, | Performed by: OBSTETRICS & GYNECOLOGY

## 2022-10-04 PROCEDURE — 1159F PR MEDICATION LIST DOCUMENTED IN MEDICAL RECORD: ICD-10-PCS | Mod: CPTII,,, | Performed by: OBSTETRICS & GYNECOLOGY

## 2022-10-04 PROCEDURE — 99395 PR PREVENTIVE VISIT,EST,18-39: ICD-10-PCS | Mod: S$PBB,,, | Performed by: OBSTETRICS & GYNECOLOGY

## 2022-10-04 PROCEDURE — 99213 OFFICE O/P EST LOW 20 MIN: CPT | Mod: PBBFAC | Performed by: OBSTETRICS & GYNECOLOGY

## 2022-10-04 PROCEDURE — 3074F SYST BP LT 130 MM HG: CPT | Mod: CPTII,,, | Performed by: OBSTETRICS & GYNECOLOGY

## 2022-10-04 PROCEDURE — 99999 PR PBB SHADOW E&M-EST. PATIENT-LVL III: ICD-10-PCS | Mod: PBBFAC,,, | Performed by: OBSTETRICS & GYNECOLOGY

## 2022-10-04 PROCEDURE — 3079F PR MOST RECENT DIASTOLIC BLOOD PRESSURE 80-89 MM HG: ICD-10-PCS | Mod: CPTII,,, | Performed by: OBSTETRICS & GYNECOLOGY

## 2022-10-04 PROCEDURE — 99999 PR PBB SHADOW E&M-EST. PATIENT-LVL III: CPT | Mod: PBBFAC,,, | Performed by: OBSTETRICS & GYNECOLOGY

## 2022-10-04 PROCEDURE — 3008F BODY MASS INDEX DOCD: CPT | Mod: CPTII,,, | Performed by: OBSTETRICS & GYNECOLOGY

## 2022-10-04 PROCEDURE — 1159F MED LIST DOCD IN RCRD: CPT | Mod: CPTII,,, | Performed by: OBSTETRICS & GYNECOLOGY

## 2022-10-04 PROCEDURE — 3079F DIAST BP 80-89 MM HG: CPT | Mod: CPTII,,, | Performed by: OBSTETRICS & GYNECOLOGY

## 2022-10-04 PROCEDURE — 99395 PREV VISIT EST AGE 18-39: CPT | Mod: S$PBB,,, | Performed by: OBSTETRICS & GYNECOLOGY

## 2022-10-04 RX ORDER — METFORMIN HYDROCHLORIDE 500 MG/1
500 TABLET ORAL 3 TIMES DAILY
COMMUNITY
Start: 2022-08-17 | End: 2023-11-16

## 2022-10-04 RX ORDER — ASPIRIN 81 MG/1
81 TABLET ORAL DAILY
COMMUNITY
End: 2023-11-16

## 2022-10-04 NOTE — PROGRESS NOTES
Subjective:    Patient ID: Joseph Pepper is a 26 y.o. y.o. female.     Chief Complaint: Annual Well Woman Exam     History of Present Illness:  Joseph presents today for Annual Well Woman exam. She describes her menses as  absent with DepoProvera.  She was due in August for her injection, but she stopped. She has had one period since then .  She denies pelvic pain.  She denies breast tenderness, masses, nipple discharge. She denies GYN complaints. She denies difficulty with urination or bowel movements.  She denies bloating, early satiety, or weight changes. She is sexually active. Contraception is by no method.      Menstrual History:   No LMP recorded. (Menstrual status: Other)..     OB History    : 1  Para: 1  Term: 1  : 0  : 0  Livin  Spontaneous : 0  Induced : 0  Ectopic: 0  Multiple: 0  Live Births: 1            The following portions of the patient's history were reviewed and updated as appropriate: allergies, current medications, past family history, past medical history, past social history, past surgical history, and problem list.    ROS:   CONSTITUTIONAL: weight gain, Negative for fever, chills, diaphoresis, weakness, fatigue, weight loss,   ENT: negative for sore throat, nasal congestion, nasal discharge, epistaxis, tinnitus, hearing loss  EYES: eye pain, negative for blurry vision, decreased vision, loss of vision, diplopia, photophobia, discharge  SKIN: Negative for rash, itching, hives  RESPIRATORY: negative for cough, hemoptysis, shortness of breath, pleuritic chest pain, wheezing  CARDIOVASCULAR: negative for chest pain, dyspnea on exertion, orthopnea, paroxysmal nocturnal dyspnea, edema, palpitations  BREAST: negative for breast  tenderness, breast mass, nipple discharge, or skin changes  GASTROINTESTINAL: negative for abdominal pain, flank pain, nausea, vomiting, diarrhea, constipation, black stool, blood in stool  GENITOURINARY: abnormal  vaginal bleeding, amenorrhea, negative for decreased libido, dysuria, genital sores, hematuria, incontinence, menorrhagia, pelvic pain, urinary frequency, vaginal discharge  HEMATOLOGIC/LYMPHATIC: negative for swollen lymph nodes, bleeding, bruising  MUSCULOSKELETAL: back pain, negative for  joint pain, joint stiffness, joint swelling, muscle pain, muscle weakness  NEUROLOGICAL: negative for dizzy/vertigo, headache, focal weakness, numbness/tingling, speech problems, loss of consciousness, confusion, memory loss  BEHAVORIAL/PSYCH: negative for anxiety, depression, psychosis  ENDOCRINE: negative for polydipsia/polyuria, palpitations, skin changes, temperature intolerance, unexpected weight changes  ALLERGIC/IMMUNOLOGIC: negative for urticaria, hay fever, angioedema      Objective:    Vital Signs:  Vitals:    10/04/22 1144   BP: 124/82   Pulse: 107   Resp: 16       Physical Exam:  General:  alert, cooperative, no distress   Skin:  Skin color, texture, turgor normal. No rashes or lesions   HEENT:  extra ocular movement intact, sclera clear, anicteric   Neck: supple, trachea midline, no adenopathy or thyromegally   Respiratory:  Normal effort   Breasts:  no discharge, erythema, tenderness, or palpable masses; no axillary lymphadenopathy   Abdomen:  soft, nontender, no palpable masses   Pelvis: External genitalia: normal general appearance  Urinary system: urethral meatus normal, bladder nontender  Vaginal: normal mucosa without prolapse or lesions  Cervix: normal appearance  Uterus: normal size, shape, position  Adnexa: normal size, nontender bilaterally   Extremities: Normal ROM; no edema, no cyanosis   Neurologial: Normal strength and tone. No focal numbness or weakness.   Psychiatric: normal mood, speech, dress, and thought processes         Assessment:       Healthy female exam.     1. Well woman exam with routine gynecological exam          Plan:      Well woman exam with routine gynecological exam      Pap 2020  NEM    COUNSELING:  Joseph was counseled on use and side-effects of various contraceptive measures, A.C.O.G. Pap guidelines and recommendations for yearly pelvic exams in addition to recommendations for monthly self breast exams; to see her PCP for other health maintenance.

## 2023-10-24 ENCOUNTER — HOSPITAL ENCOUNTER (OUTPATIENT)
Dept: RADIOLOGY | Facility: HOSPITAL | Age: 28
Discharge: HOME | End: 2023-10-24
Attending: NURSE PRACTITIONER
Payer: MEDICAID

## 2023-10-24 DIAGNOSIS — M51.36 OTHER INTERVERTEBRAL DISC DEGENERATION, LUMBAR REGION: ICD-10-CM

## 2023-10-24 PROCEDURE — 72148 MRI LUMBAR SPINE W/O DYE: CPT | Mod: TC

## 2023-10-24 PROCEDURE — 72148 MRI LUMBAR SPINE WITHOUT CONTRAST: ICD-10-PCS | Mod: 26,,, | Performed by: RADIOLOGY

## 2023-10-24 PROCEDURE — 72148 MRI LUMBAR SPINE W/O DYE: CPT | Mod: 26,,, | Performed by: RADIOLOGY

## 2023-11-09 ENCOUNTER — PATIENT MESSAGE (OUTPATIENT)
Dept: PAIN MEDICINE | Facility: CLINIC | Age: 28
End: 2023-11-09
Payer: MEDICAID

## 2023-11-16 ENCOUNTER — OFFICE VISIT (OUTPATIENT)
Dept: PAIN MEDICINE | Facility: CLINIC | Age: 28
End: 2023-11-16
Payer: MEDICAID

## 2023-11-16 VITALS
SYSTOLIC BLOOD PRESSURE: 124 MMHG | HEIGHT: 67 IN | WEIGHT: 283.13 LBS | BODY MASS INDEX: 44.44 KG/M2 | DIASTOLIC BLOOD PRESSURE: 86 MMHG | HEART RATE: 103 BPM | OXYGEN SATURATION: 98 %

## 2023-11-16 DIAGNOSIS — G89.29 CHRONIC SACROILIAC JOINT PAIN: Primary | ICD-10-CM

## 2023-11-16 DIAGNOSIS — M53.3 CHRONIC SACROILIAC JOINT PAIN: Primary | ICD-10-CM

## 2023-11-16 PROCEDURE — 1159F MED LIST DOCD IN RCRD: CPT | Mod: CPTII,,, | Performed by: ANESTHESIOLOGY

## 2023-11-16 PROCEDURE — 3079F DIAST BP 80-89 MM HG: CPT | Mod: CPTII,,, | Performed by: ANESTHESIOLOGY

## 2023-11-16 PROCEDURE — 99999 PR PBB SHADOW E&M-EST. PATIENT-LVL IV: ICD-10-PCS | Mod: PBBFAC,,, | Performed by: ANESTHESIOLOGY

## 2023-11-16 PROCEDURE — 3008F BODY MASS INDEX DOCD: CPT | Mod: CPTII,,, | Performed by: ANESTHESIOLOGY

## 2023-11-16 PROCEDURE — 99214 OFFICE O/P EST MOD 30 MIN: CPT | Mod: PBBFAC | Performed by: ANESTHESIOLOGY

## 2023-11-16 PROCEDURE — 3079F PR MOST RECENT DIASTOLIC BLOOD PRESSURE 80-89 MM HG: ICD-10-PCS | Mod: CPTII,,, | Performed by: ANESTHESIOLOGY

## 2023-11-16 PROCEDURE — 3008F PR BODY MASS INDEX (BMI) DOCUMENTED: ICD-10-PCS | Mod: CPTII,,, | Performed by: ANESTHESIOLOGY

## 2023-11-16 PROCEDURE — 3074F PR MOST RECENT SYSTOLIC BLOOD PRESSURE < 130 MM HG: ICD-10-PCS | Mod: CPTII,,, | Performed by: ANESTHESIOLOGY

## 2023-11-16 PROCEDURE — 1160F RVW MEDS BY RX/DR IN RCRD: CPT | Mod: CPTII,,, | Performed by: ANESTHESIOLOGY

## 2023-11-16 PROCEDURE — 1160F PR REVIEW ALL MEDS BY PRESCRIBER/CLIN PHARMACIST DOCUMENTED: ICD-10-PCS | Mod: CPTII,,, | Performed by: ANESTHESIOLOGY

## 2023-11-16 PROCEDURE — 99999 PR PBB SHADOW E&M-EST. PATIENT-LVL IV: CPT | Mod: PBBFAC,,, | Performed by: ANESTHESIOLOGY

## 2023-11-16 PROCEDURE — 1159F PR MEDICATION LIST DOCUMENTED IN MEDICAL RECORD: ICD-10-PCS | Mod: CPTII,,, | Performed by: ANESTHESIOLOGY

## 2023-11-16 PROCEDURE — 99204 PR OFFICE/OUTPT VISIT, NEW, LEVL IV, 45-59 MIN: ICD-10-PCS | Mod: S$PBB,,, | Performed by: ANESTHESIOLOGY

## 2023-11-16 PROCEDURE — 99204 OFFICE O/P NEW MOD 45 MIN: CPT | Mod: S$PBB,,, | Performed by: ANESTHESIOLOGY

## 2023-11-16 PROCEDURE — 3074F SYST BP LT 130 MM HG: CPT | Mod: CPTII,,, | Performed by: ANESTHESIOLOGY

## 2023-11-16 RX ORDER — IBUPROFEN 800 MG/1
800 TABLET ORAL EVERY 6 HOURS PRN
COMMUNITY
Start: 2023-08-28 | End: 2024-01-05 | Stop reason: SDUPTHER

## 2023-11-16 RX ORDER — GABAPENTIN 100 MG/1
100 CAPSULE ORAL 3 TIMES DAILY
Qty: 90 CAPSULE | Refills: 0 | Status: SHIPPED | OUTPATIENT
Start: 2023-11-16 | End: 2024-01-05 | Stop reason: SDUPTHER

## 2023-11-16 RX ORDER — FAMOTIDINE 20 MG/1
20 TABLET, FILM COATED ORAL 2 TIMES DAILY
COMMUNITY

## 2023-11-16 NOTE — PROGRESS NOTES
[facility] NEW Patient Evaluation    Joseph Pepper  : 1995  Date: 2023     CHIEF COMPLAINT:  Low-back Pain and Leg Pain (bilateral)    Referring Physician: Jesusita Cadet*    Primary Care Physician: Jesusita Cadet, NP    HPI:  This is a 28 y.o. female with a chief complaint of Low-back Pain and Leg Pain (bilateral)  . The patient has Past medical history/Past surgical history of Afib, BELKIS and chronic low back pain    Patient was evaluated and referred by primary care provider for axial low back pain related to her bilateral SI joint pain   She has a MRI lumbar spine that showed disc bulge/protrusion at L4-L5 and L5-S1 however no significant central or neural foramina narrowing at any level    Diabetic: No    Anticogualtion drugs: Low Dose Aspirin     Current Description of Pain Symptoms:    Onset: Chronic 2023  Pain Location:low back and bilateral SI joint  Radiates/associated symptoms: BLE, Bilateral Buttock with intermittent radiation to the legs below the knee.   Pain is Getting worse over the last 3 months    The pain is described as numbing, sharp, shooting, stabbing, and throbbing.   Exacerbating factors: Sitting, Standing, Bending, Walking, Morning, and Getting out of bed/chair.   Mitigating factors laying down, medications, and rest.   Symptoms interfere with daily activity, sleeping, and .   The patient feels like symptoms have been worsening.   Patient denies night fever/night sweats, urinary incontinence, bowel incontinence, significant weight loss, significant motor weakness, and loss of sensations.    Pain score:   Current: Pain is 6/10    Current pain medications:  Ibuprofen 800 MG, PRN    Current Narcotics/Opioid /benzo Medications:  Opioids- None  Benzodiazepines: No    UDS:  NA    PDMP:  Reviewed and consistent with medication use as prescribed.     Pain Treatment History:  Physical Therapy/HEP/Physician Lead exercise program:  Over  "the past 12 months, Patient has done > 10 sessions  PT response: Partially Helpful   Dates of the PT sessions: 2023, 2023  Is Patient participating in home exercise program (HEP): Yes    Non-interventional Pain Therapy:  [x]Chiropractor   []Acupuncture/Dry needle  []TENS unit  [x]Heat/ICE  []Back Brace    Medications previously tried:  NSAIDs: Ibuprofen (Advil/Motrin)  Topical Agent: Yes, biofreeze  TCA/SSRI/SNRI: None  Anti-convulsants: None  Muscle Relaxants: None  Opioids- None    Interventional Pain Procedures:  No    Previous spine/joint surgery:  No    Surgical History:   has a past surgical history that includes TONSILLECTOMY, ADENOIDECTOMY, BILATERAL yringotomy and tubes; Esophagoscopy; [place] tooth extraction; Colonoscopy; and  section (N/A, 2021).  Medical History:   has a past medical history of History of atrial fibrillation.  Family History:  family history is not on file.  Allergies:  Iodine, Allergenic extract-food-shrimp, and Pollen extracts   Social History/SUBSTANCE ABUSE HISTORY:  Personal history of substance abuse: No   reports that she has never smoked. She has never used smokeless tobacco. She reports that she does not currently use alcohol. She reports that she does not use drugs.  LABS:  CBC  Lab Results   Component Value Date    WBC 10.29 2022    HGB 14.1 2022    HCT 43.7 2022     Coagulation Profile   Lab Results   Component Value Date     2022       Lab Results   Component Value Date    INR 1.0 2022     CMP:  BMP  Lab Results   Component Value Date     2022    K 4.2 2022     2022    CO2 20 (L) 2022    BUN 11 2022    CREATININE 0.7 2022    CALCIUM 9.0 2022    ANIONGAP 11 2022     Lab Results   Component Value Date    ALT 16 2022    AST 11 2022    GGT 19 2011    ALKPHOS 87 2022    BILITOT 0.4 2022     HGBA1C:  No results found for: "LABA1C", " ""HGBA1C"    ROS:    Review of Systems   GENERAL:  No weight loss, malaise or fevers.  HEENT:   No recent changes in vision or hearing  NECK:  Negative for lumps, no difficulty with swallowing.  RESPIRATORY:  Negative for cough, wheezing or shortness of breath, patient denies any recent URI.  CARDIOVASCULAR:  Negative for chest pain, leg swelling or palpitations.  GI:  Negative for abdominal discomfort, blood in stools or black stools or change in bowel habits.  MUSCULOSKELETAL:  See HPI.  SKIN:  Negative for lesions, rash, and itching.  PSYCH:  No mood disorder or recent psychosocial stressors.   HEMATOLOGY/LYMPHOLOGY:  Negative for prolonged bleeding, bruising easily or swollen nodes.  Patient is not currently taking any anti-coagulants  NEURO:  See HPI  All other reviewed and negative other than HPI.    PHYSICAL EXAM:  VITALS: /86   Pulse 103   Ht 5' 7" (1.702 m)   Wt 128.4 kg (283 lb 1.6 oz)   SpO2 98%   BMI 44.34 kg/m²   Body mass index is 44.34 kg/m².  GENERAL: Well appearing, in no acute distress, alert and oriented x3, answers questions appropriately.   PSYCH: Flat affect.  SKIN: Skin color, texture, turgor normal, no rashes or lesions.  HEAD/FACE:  Normocephalic, atraumatic. Cranial nerves grossly intact.  CV: Regular rate  PULM: No evidence of respiratory difficulty, symmetric chest rise.  GI:  Soft and non-Distended.    BACK/SIJ/HIP  Lumbar Spine Exam:       Inspection: No erythema, bruising.       Palpation: (+) TTP of lumbar paraspinals bilaterally      ROM:  Limited in flexion, extension, lateral bending.       (+) Facet loading bilaterally      (-) Straight Leg Raise bilaterally      (+) MONTY bilaterally, Tenderness over the PSIS, Yeoman test  Hip Exam:      Inspection: No gross deformity or apparent leg length discrepancy      Palpation:  No TTP to bilateral greater trochanteric bursas.       ROM:  No limitation or pain in internal rotation, external rotation b/l  Neurologic Exam:     " Alert. Speech is fluent and appropriate.     Strength: 5/5 in right/left hip flexion and knee extension, otherwise 5/5 throughout bilateral lower extremities     Sensation:  Grossly intact to light touch in bilateral lower extremities     Tone: No abnormality appreciated in bilateral lower extremities     No Clonus    GAIT:  Normal gait    DIAGNOSTIC STUDIES AND MEDICAL RECORDS REVIEW:        I have personally reviewed and interpreted relevant radiology reports and reviewed relevant records from other services in the EMR.      Lumbar MRI Spine 10/24/2023:  FINDINGS:  Alignment: Normal.     Vertebrae: 5 lumbar-type vertebral bodies. No aggressive marrow replacement process or fracture.     Discs: Disc space narrowing with disc desiccation at the L4-5 and L5-S1 levels.     Cord: Normal.  Conus terminates at .     Degenerative findings:     T12-L1: No significant spinal canal stenosis or neural foraminal narrowing.     L1-L2:   No significant spinal canal stenosis or neural foraminal narrowing.     L2-L3: No significant spinal canal stenosis or neural foraminal narrowing.     L3-L4: No significant spinal canal stenosis or neural foraminal narrowing.     L4-L5: Broad-based posterior disc bulge with superimposed central disc protrusion and facet arthropathy.  No central canal stenosis or neural foraminal narrowing.     L5-S1: Broad-based posterior disc bulge with superimposed central disc protrusion, prominent epidural fat and facet arthropathy.  No central canal stenosis or neural foraminal narrowing is seen.     Paraspinous soft tissues: Normal.     ASSESSMENT:  Joseph Pepper is a 28 y.o. female with the following diagnoses based on history, exam, and imaging:  Problem List Items Addressed This Visit    None  Visit Diagnoses       Chronic sacroiliac joint pain    -  Primary           This is a pleasant 28 y.o. female patient with PMH anxiety, AFib, not on blood thinners, obstructive face apnea, gestational  diabetes, presenting with low back pain consistent with bilateral SIJ pain left worse than right (+Fortan's finger, +PSIS tenderness, +MONTY, +compression, +Yeoman). The patient has undergone conservative therapies, including analgesics and physical therapy more than 10 sessions between August and September 2023, without improvement. Based on the above finding, Patient is Interested in Pursuing bilateral Sacroiliac Joint Injection.      We discussed the underlying diagnoses and multiple treatment options including non-opioid medications, interventional procedures, physical therapy, home exercise, core muscle enhancement, and weight loss.  The risks and benefits of each treatment option were discussed and all questions were answered.      Treatment Plan:    Diagnostics/Referrals: -     Medications:    NSAIDs:  Continue ibuprofen as prescribed  Topical Agent: NA  TCA/SSRI/SNRI: None  Anti-convulsants:  Start gabapentin 100 mg 3 times a day, prescription was provided for 30 days  Muscle Relaxants: None  Opioids: None    Interventional Therapy: Please schedule For Bilateral SIJ injection.    Regarding the above interventions, the patient has been educated regarding the risks (including bleeding, infection, increased pain, nerve damage, or allergic reaction), benefits, and alternatives. The patient states she understands and is eager to proceed.    Physical Rehabilitation: Referral to Physical therapy for Lumbar stabilization, core strengthening, and a home exercise program.    Patient Education: Counseled patient regarding the importance of activity modification, I have stressed the importance of physical activity and a home exercise plan to help with pain and improve health    Follow-up: RTC 4 weeks after injection    May consider: ROSY    I would like to thank Jesusita Cadet for the opportunity to assist in the care of this patient. We had a very nice visit and I look forward to continuing their care. Please  let me know if I can be of further assistance.     Dez Gonzales MD  Anesthesiologist  Interventional Pain Medicine  11/16/2023    Disclaimer:  This note was prepared using voice recognition system and is likely to have sound alike errors that may have been overlooked even after proof reading.  Please call me with any questions.

## 2023-11-16 NOTE — PROGRESS NOTES
[facility] NEW Patient Evaluation    Joseph Pepper  : 1995  Date: 2023     CHIEF COMPLAINT:  No chief complaint on file.      Referring Physician: Jesusita Cadet*    Primary Care Physician: Jesusita Cadet, NP    HPI:  This is a 28 y.o. female with a chief complaint of No chief complaint on file.  . The patient has Past medical history/Past surgical history of Afib, BELKIS    ***    Diabetic: No    Anticogualtion drugs: Low Dose Aspirin     Current Description of Pain Symptoms:    Onset: Chronic  Pain Location:low back  Radiates/associated symptoms: BLE.   Pain is Getting worse over the last 3 months    The pain is described as numbing, sharp, shooting, stabbing, and throbbing.   Exacerbating factors: Sitting, Standing, Bending, Walking, Morning, and Getting out of bed/chair.   Mitigating factors laying down, medications, and rest.   Symptoms interfere with daily activity, sleeping, and .   The patient feels like symptoms have been worsening.   Patient denies night fever/night sweats, urinary incontinence, bowel incontinence, significant weight loss, significant motor weakness, and loss of sensations.    Pain score:   Current: Pain is 6    Current pain medications:  Ibuprofen 800 MG, PRN    Current Outpatient Medications:     ALPRAZolam (XANAX) 0.25 MG tablet, Take 1 tablet (0.25 mg total) by mouth 3 (three) times daily as needed for Anxiety., Disp: 10 tablet, Rfl: 0    aspirin (ECOTRIN) 81 MG EC tablet, Take 81 mg by mouth once daily., Disp: , Rfl:     metFORMIN (GLUCOPHAGE) 500 MG tablet, Take 500 mg by mouth 3 (three) times daily., Disp: , Rfl:     metoprolol tartrate (LOPRESSOR) 100 MG tablet, Take 1 tablet (100 mg total) by mouth 2 (two) times daily. (Patient not taking: Reported on 10/4/2022), Disp: 60 tablet, Rfl: 2    Current Narcotics/Opioid /benzo Medications:  Opioids- None  Benzodiazepines: No    UDS:  NA    PDMP:  {:19440}     Pain Treatment  History:  Physical Therapy/HEP/Physician Lead exercise program:  Over the past 12 months, Patient has done > 10 sessions  PT response: Partially Helpful   Dates of the PT sessions: 2023, 2023  Is Patient participating in home exercise program (HEP): Yes    Non-interventional Pain Therapy:  [x]Chiropractor   []Acupuncture/Dry needle  []TENS unit  [x]Heat/ICE  []Back Brace    Medications previously tried:  NSAIDs: Ibuprofen (Advil/Motrin)  Topical Agent: Yes, biofreeze  TCA/SSRI/SNRI: None  Anti-convulsants: None  Muscle Relaxants: None  Opioids- None    Interventional Pain Procedures:  No    Previous spine/joint surgery:  No  ***  Surgical History:   has a past surgical history that includes TONSILLECTOMY, ADENOIDECTOMY, BILATERAL yringotomy and tubes; Esophagoscopy; [place] tooth extraction; Colonoscopy; and  section (N/A, 2021).  Medical History:   has a past medical history of History of atrial fibrillation.  Family History:  family history is not on file.  Allergies:  Allergenic extract-food-shrimp and Pollen extracts   Social History/SUBSTANCE ABUSE HISTORY:  Personal history of substance abuse: No   reports that she has never smoked. She has never used smokeless tobacco. She reports that she does not currently use alcohol. She reports that she does not use drugs.  LABS:  CBC  Lab Results   Component Value Date    WBC 10.29 2022    HGB 14.1 2022    HCT 43.7 2022     Coagulation Profile   Lab Results   Component Value Date     2022       Lab Results   Component Value Date    INR 1.0 2022     CMP:  BMP  Lab Results   Component Value Date     2022    K 4.2 2022     2022    CO2 20 (L) 2022    BUN 11 2022    CREATININE 0.7 2022    CALCIUM 9.0 2022    ANIONGAP 11 2022     Lab Results   Component Value Date    ALT 16 2022    AST 11 2022    GGT 19 2011    ALKPHOS 87 2022    BILITOT  "0.4 03/30/2022     HGBA1C:  No results found for: "LABA1C", "HGBA1C"    ROS:    Review of Systems   GENERAL:  No weight loss, malaise or fevers.  HEENT:   No recent changes in vision or hearing  NECK:  Negative for lumps, no difficulty with swallowing.  RESPIRATORY:  Negative for cough, wheezing or shortness of breath, patient denies any recent URI.  CARDIOVASCULAR:  Negative for chest pain, leg swelling or palpitations.  GI:  Negative for abdominal discomfort, blood in stools or black stools or change in bowel habits.  MUSCULOSKELETAL:  See HPI.  SKIN:  Negative for lesions, rash, and itching.  PSYCH:  No mood disorder or recent psychosocial stressors.   HEMATOLOGY/LYMPHOLOGY:  Negative for prolonged bleeding, bruising easily or swollen nodes.  Patient is not currently taking any anti-coagulants  NEURO:  See HPI  All other reviewed and negative other than HPI.    PHYSICAL EXAM:  VITALS: There were no vitals taken for this visit.  There is no height or weight on file to calculate BMI.  GENERAL: Well appearing, in no acute distress, alert and oriented x3, answers questions appropriately.   PSYCH: Flat affect.  SKIN: Skin color, texture, turgor normal, no rashes or lesions.  HEAD/FACE:  Normocephalic, atraumatic. Cranial nerves grossly intact.  CV: Regular rate  PULM: No evidence of respiratory difficulty, symmetric chest rise.  GI:  Soft and non-Distended.  NECK: (***) pain to palpation over the cervical paraspinous muscles. Spurling: ***. (***) pain with neck flexion, extension, or lateral flexion, Muscle strength in RT UE ***/5 and Left UE ***/5, Hand  5/5  BACK/SIJ/HIP  Lumbar Spine Exam:       Inspection: No erythema, bruising.       Palpation: (***) TTP of lumbar paraspinals bilaterally      ROM:  Limited in flexion, extension, lateral bending.       (***) Facet loading bilaterally      (***) Straight Leg Raise bilaterally      (***) MONTY bilaterally, Tenderness over the PSIS, Yeoman test  Hip Exam:      " Inspection: No gross deformity or apparent leg length discrepancy      Palpation:  No TTP to bilateral greater trochanteric bursas.       ROM:  No limitation or pain in internal rotation, external rotation b/l  Neurologic Exam:     Alert. Speech is fluent and appropriate.     Strength: ***/5 in right hip flexion and knee extension, otherwise 5/5 throughout bilateral lower extremities     Sensation:  Grossly intact to light touch in bilateral lower extremities     Tone: No abnormality appreciated in bilateral lower extremities     No Clonus  EXTREMITIES: Peripheral joint ROM is full and pain free without obvious instability or laxity in all four extremities. No deformities, edema, or skin discoloration.     MUSCULOSKELETAL: Shoulder, hip, and knee provocative maneuvers are negative.  Bilateral upper and lower extremity strength is normal and symmetric.  No atrophy or tone abnormalities are noted.    NEURO: Bilateral upper and lower extremity coordination and muscle stretch reflexes are physiologic and symmetric.  Plantar response are downgoing. No clonus.  No loss of sensation is noted.    GAIT: ***    DIAGNOSTIC STUDIES AND MEDICAL RECORDS REVIEW:        I have personally reviewed and interpreted relevant radiology reports and reviewed relevant records from other services in the EMR.      Lumbar MRI Spine 10/24/2023:  FINDINGS:  Alignment: Normal.     Vertebrae: 5 lumbar-type vertebral bodies. No aggressive marrow replacement process or fracture.     Discs: Disc space narrowing with disc desiccation at the L4-5 and L5-S1 levels.     Cord: Normal.  Conus terminates at .     Degenerative findings:     T12-L1: No significant spinal canal stenosis or neural foraminal narrowing.     L1-L2:   No significant spinal canal stenosis or neural foraminal narrowing.     L2-L3: No significant spinal canal stenosis or neural foraminal narrowing.     L3-L4: No significant spinal canal stenosis or neural foraminal narrowing.    "  L4-L5: Broad-based posterior disc bulge with superimposed central disc protrusion and facet arthropathy.  No central canal stenosis or neural foraminal narrowing.     L5-S1: Broad-based posterior disc bulge with superimposed central disc protrusion, prominent epidural fat and facet arthropathy.  No central canal stenosis or neural foraminal narrowing is seen.     Paraspinous soft tissues: Normal.     ASSESSMENT:  Joseph Pepper is a 28 y.o. female with the following diagnoses based on history, exam, and imaging:  Problem List Items Addressed This Visit    None     This is a pleasant 28 y.o. female patient with PMH ***, presenting with***.     We discussed the underlying diagnoses and multiple treatment options including non-opioid medications, interventional procedures, physical therapy, home exercise, core muscle enhancement, and weight loss.  The risks and benefits of each treatment option were discussed and all questions were answered.      Treatment Plan:    Diagnostics/Referrals: ***     Medications:    NSAIDs: {GANSIAD:49491}  Topical Agent: {GAYes/No/NA:70475}  TCA/SSRI/SNRI: {GATCA/SSRI/SNRI:17648}  Anti-convulsants: {GAAnticonvulsants:85906}  Muscle Relaxants: {GAmuscle Relaxant:95311}  Opioids: {GAopioid:66491::"None"}    Interventional Therapy: Please schedule for ***    Regarding the above interventions, the patient has been educated regarding the risks (including bleeding, infection, increased pain, nerve damage, or allergic reaction), benefits, and alternatives. The patient states she understands and is eager to proceed.    Physical Rehabilitation: Referral to Physical therapy for Lumbar stabilization, core strengthening, and a home exercise program.    Patient Education: Counseled patient regarding the importance of {:44014}, I have stressed the importance of physical activity and a home exercise plan to help with pain and improve health    Follow-up: RTC ***    May consider:     I " would like to thank Jesusita Cadet for the opportunity to assist in the care of this patient. We had a very nice visit and I look forward to continuing their care. Please let me know if I can be of further assistance.     Dez Gonzales MD  Anesthesiologist  Interventional Pain Medicine  11/16/2023    Disclaimer:  This note was prepared using voice recognition system and is likely to have sound alike errors that may have been overlooked even after proof reading.  Please call me with any questions.

## 2023-11-16 NOTE — PATIENT INSTRUCTIONS
Start Gabapentin as follows:    - 100 mg at bedtime for 5 days, if no side effects increase to   - 100 mg (morning) and 100 mg (bedtime), if no side effects then finally increase to   - 100 mg (morning), 100 mg (noon) and 100 mg (bedtime)    Take the medication as prescribed. Discontinuing Gabapentin abruptly may cause seizures.     This medication can cause dizziness, drowsiness, uncoordinated movement  and hallucinations, vivid dreams, tiredness, blurred/double vision, unusual eye movements, or shaking (tremor) may occur on starting the therapy or on increasing the dose. In the long term Gabapentin may lead to weight gain. If any of these effects persist or worsen go back to the previous dose or stop the medication and inform your doctor or pharmacist promptly.

## 2023-11-16 NOTE — PROGRESS NOTES
"[facility] NEW Patient Evaluation    Joseph Pepper  : 1995  Date: 2023     CHIEF COMPLAINT:  No chief complaint on file.      Referring Physician: Jesusita Cadet*    Primary Care Physician: Jesusita Cadet, NP    HPI:  This is a 28 y.o. female with a chief complaint of No chief complaint on file.  . The patient has Past medical history/Past surgical history of Afib, BELKIS    ***    Diabetic: {GAYes/No/NA:08312}    Anticogualtion drugs: Low Dose Aspirin     Current Description of Pain Symptoms:    Onset: Chronic  Pain Location:***  Radiates/associated symptoms: ***.   Pain is Getting worse over the last 3 months    The pain is described as {Desc; pain character:60759}.   Exacerbating factors: {Causes; Pain:27689}.   Mitigating factors laying down, medications, and rest.   Symptoms interfere with daily activity, sleeping, and ***.   The patient feels like symptoms have been {IUW:74790}.   Patient {Denies / Reports:31622} {RED FLAGS:72484}.    Pain score:   Current: Pain is {PAIN 0-10:40017}    Current pain medications:    Current Outpatient Medications:     ALPRAZolam (XANAX) 0.25 MG tablet, Take 1 tablet (0.25 mg total) by mouth 3 (three) times daily as needed for Anxiety., Disp: 10 tablet, Rfl: 0    aspirin (ECOTRIN) 81 MG EC tablet, Take 81 mg by mouth once daily., Disp: , Rfl:     metFORMIN (GLUCOPHAGE) 500 MG tablet, Take 500 mg by mouth 3 (three) times daily., Disp: , Rfl:     metoprolol tartrate (LOPRESSOR) 100 MG tablet, Take 1 tablet (100 mg total) by mouth 2 (two) times daily. (Patient not taking: Reported on 10/4/2022), Disp: 60 tablet, Rfl: 2    Current Narcotics/Opioid /benzo Medications:  Opioids- {GAopioid:30295::"None"}  Benzodiazepines: {GAYes/No/NA:24133}    UDS:  NA    PDMP:  {:83955}     Pain Treatment History:  Physical Therapy/HEP/Physician Lead exercise program:  Over the past 12 months, Patient has done > *** sessions  PT response: Partially " Helpful   Dates of the PT sessions: ***, ***  Is Patient participating in home exercise program (HEP): {GAYes/No/NA:88624}    Non-interventional Pain Therapy:  []Chiropractor   []Acupuncture/Dry needle  []TENS unit  []Heat/ICE  []Back Brace    Medications previously tried:  NSAIDs: {GANSIAD:27555}  Topical Agent: {GAYes/No/NA:51814}  TCA/SSRI/SNRI: {GATCA/SSRI/SNRI:66706}  Anti-convulsants: {GAAnticonvulsants:50301}  Muscle Relaxants: {GAmuscle Relaxant:40333}  Opioids- {GAopioid:54101}    Interventional Pain Procedures:  {GAYes/No/NA:82623}    Previous spine/joint surgery:  {GAYes/No/NA:31629}  ***  Surgical History:   has a past surgical history that includes TONSILLECTOMY, ADENOIDECTOMY, BILATERAL yringotomy and tubes; Esophagoscopy; [place] tooth extraction; Colonoscopy; and  section (N/A, 2021).  Medical History:   has a past medical history of History of atrial fibrillation.  Family History:  family history is not on file.  Allergies:  Allergenic extract-food-shrimp and Pollen extracts   Social History/SUBSTANCE ABUSE HISTORY:  Personal history of substance abuse: No   reports that she has never smoked. She has never used smokeless tobacco. She reports that she does not currently use alcohol. She reports that she does not use drugs.  LABS:  CBC  Lab Results   Component Value Date    WBC 10.29 2022    HGB 14.1 2022    HCT 43.7 2022     Coagulation Profile   Lab Results   Component Value Date     2022       Lab Results   Component Value Date    INR 1.0 2022     CMP:  BMP  Lab Results   Component Value Date     2022    K 4.2 2022     2022    CO2 20 (L) 2022    BUN 11 2022    CREATININE 0.7 2022    CALCIUM 9.0 2022    ANIONGAP 11 2022     Lab Results   Component Value Date    ALT 16 2022    AST 11 2022    GGT 19 2011    ALKPHOS 87 2022    BILITOT 0.4 2022     HGBA1C:  No  "results found for: "LABA1C", "HGBA1C"    ROS:    Review of Systems   GENERAL:  No weight loss, malaise or fevers.  HEENT:   No recent changes in vision or hearing  NECK:  Negative for lumps, no difficulty with swallowing.  RESPIRATORY:  Negative for cough, wheezing or shortness of breath, patient denies any recent URI.  CARDIOVASCULAR:  Negative for chest pain, leg swelling or palpitations.  GI:  Negative for abdominal discomfort, blood in stools or black stools or change in bowel habits.  MUSCULOSKELETAL:  See HPI.  SKIN:  Negative for lesions, rash, and itching.  PSYCH:  No mood disorder or recent psychosocial stressors.   HEMATOLOGY/LYMPHOLOGY:  Negative for prolonged bleeding, bruising easily or swollen nodes.  Patient is not currently taking any anti-coagulants  NEURO:  See HPI  All other reviewed and negative other than HPI.    PHYSICAL EXAM:  VITALS: There were no vitals taken for this visit.  There is no height or weight on file to calculate BMI.  GENERAL: Well appearing, in no acute distress, alert and oriented x3, answers questions appropriately.   PSYCH: Flat affect.  SKIN: Skin color, texture, turgor normal, no rashes or lesions.  HEAD/FACE:  Normocephalic, atraumatic. Cranial nerves grossly intact.  CV: Regular rate  PULM: No evidence of respiratory difficulty, symmetric chest rise.  GI:  Soft and non-Distended.  NECK: (***) pain to palpation over the cervical paraspinous muscles. Spurling: ***. (***) pain with neck flexion, extension, or lateral flexion, Muscle strength in RT UE ***/5 and Left UE ***/5, Hand  5/5  BACK/SIJ/HIP  Lumbar Spine Exam:       Inspection: No erythema, bruising.       Palpation: (***) TTP of lumbar paraspinals bilaterally      ROM:  Limited in flexion, extension, lateral bending.       (***) Facet loading bilaterally      (***) Straight Leg Raise bilaterally      (***) MONTY bilaterally, Tenderness over the PSIS, Yeoman test  Hip Exam:      Inspection: No gross deformity or " apparent leg length discrepancy      Palpation:  No TTP to bilateral greater trochanteric bursas.       ROM:  No limitation or pain in internal rotation, external rotation b/l  Neurologic Exam:     Alert. Speech is fluent and appropriate.     Strength: ***/5 in right hip flexion and knee extension, otherwise 5/5 throughout bilateral lower extremities     Sensation:  Grossly intact to light touch in bilateral lower extremities     Tone: No abnormality appreciated in bilateral lower extremities     No Clonus  EXTREMITIES: Peripheral joint ROM is full and pain free without obvious instability or laxity in all four extremities. No deformities, edema, or skin discoloration.     MUSCULOSKELETAL: Shoulder, hip, and knee provocative maneuvers are negative.  Bilateral upper and lower extremity strength is normal and symmetric.  No atrophy or tone abnormalities are noted.    NEURO: Bilateral upper and lower extremity coordination and muscle stretch reflexes are physiologic and symmetric.  Plantar response are downgoing. No clonus.  No loss of sensation is noted.    GAIT: ***    DIAGNOSTIC STUDIES AND MEDICAL RECORDS REVIEW:        I have personally reviewed and interpreted relevant radiology reports and reviewed relevant records from other services in the EMR.      Lumbar MRI Spine 10/24/2023:  FINDINGS:  Alignment: Normal.     Vertebrae: 5 lumbar-type vertebral bodies. No aggressive marrow replacement process or fracture.     Discs: Disc space narrowing with disc desiccation at the L4-5 and L5-S1 levels.     Cord: Normal.  Conus terminates at .     Degenerative findings:     T12-L1: No significant spinal canal stenosis or neural foraminal narrowing.     L1-L2:   No significant spinal canal stenosis or neural foraminal narrowing.     L2-L3: No significant spinal canal stenosis or neural foraminal narrowing.     L3-L4: No significant spinal canal stenosis or neural foraminal narrowing.     L4-L5: Broad-based posterior disc  "bulge with superimposed central disc protrusion and facet arthropathy.  No central canal stenosis or neural foraminal narrowing.     L5-S1: Broad-based posterior disc bulge with superimposed central disc protrusion, prominent epidural fat and facet arthropathy.  No central canal stenosis or neural foraminal narrowing is seen.     Paraspinous soft tissues: Normal.     ASSESSMENT:  Joseph Pepper is a 28 y.o. female with the following diagnoses based on history, exam, and imaging:  Problem List Items Addressed This Visit    None     This is a pleasant 28 y.o. female patient with PMH ***, presenting with***.     We discussed the underlying diagnoses and multiple treatment options including non-opioid medications, interventional procedures, physical therapy, home exercise, core muscle enhancement, and weight loss.  The risks and benefits of each treatment option were discussed and all questions were answered.      Treatment Plan:    Diagnostics/Referrals: ***     Medications:    NSAIDs: {GANSIAD:02511}  Topical Agent: {GAYes/No/NA:23693}  TCA/SSRI/SNRI: {GATCA/SSRI/SNRI:17344}  Anti-convulsants: {GAAnticonvulsants:47074}  Muscle Relaxants: {GAmuscle Relaxant:80824}  Opioids: {GAopioid:63261::"None"}    Interventional Therapy: Please schedule for ***    Regarding the above interventions, the patient has been educated regarding the risks (including bleeding, infection, increased pain, nerve damage, or allergic reaction), benefits, and alternatives. The patient states she understands and is eager to proceed.    Physical Rehabilitation: Referral to Physical therapy for Lumbar stabilization, core strengthening, and a home exercise program.    Patient Education: Counseled patient regarding the importance of {:09602}, I have stressed the importance of physical activity and a home exercise plan to help with pain and improve health    Follow-up: RTC ***    May consider:     I would like to thank Jesusita Cadet " A.* for the opportunity to assist in the care of this patient. We had a very nice visit and I look forward to continuing their care. Please let me know if I can be of further assistance.     Dez Gonzales MD  Anesthesiologist  Interventional Pain Medicine  11/16/2023    Disclaimer:  This note was prepared using voice recognition system and is likely to have sound alike errors that may have been overlooked even after proof reading.  Please call me with any questions.

## 2023-11-16 NOTE — H&P (VIEW-ONLY)
[facility] NEW Patient Evaluation    Joseph Pepper  : 1995  Date: 2023     CHIEF COMPLAINT:  Low-back Pain and Leg Pain (bilateral)    Referring Physician: Jesusita Cadet*    Primary Care Physician: Jesusita Cadet, NP    HPI:  This is a 28 y.o. female with a chief complaint of Low-back Pain and Leg Pain (bilateral)  . The patient has Past medical history/Past surgical history of Afib, BELKIS and chronic low back pain    Patient was evaluated and referred by primary care provider for axial low back pain related to her bilateral SI joint pain   She has a MRI lumbar spine that showed disc bulge/protrusion at L4-L5 and L5-S1 however no significant central or neural foramina narrowing at any level    Diabetic: No    Anticogualtion drugs: Low Dose Aspirin     Current Description of Pain Symptoms:    Onset: Chronic 2023  Pain Location:low back and bilateral SI joint  Radiates/associated symptoms: BLE, Bilateral Buttock with intermittent radiation to the legs below the knee.   Pain is Getting worse over the last 3 months    The pain is described as numbing, sharp, shooting, stabbing, and throbbing.   Exacerbating factors: Sitting, Standing, Bending, Walking, Morning, and Getting out of bed/chair.   Mitigating factors laying down, medications, and rest.   Symptoms interfere with daily activity, sleeping, and .   The patient feels like symptoms have been worsening.   Patient denies night fever/night sweats, urinary incontinence, bowel incontinence, significant weight loss, significant motor weakness, and loss of sensations.    Pain score:   Current: Pain is 6/10    Current pain medications:  Ibuprofen 800 MG, PRN    Current Narcotics/Opioid /benzo Medications:  Opioids- None  Benzodiazepines: No    UDS:  NA    PDMP:  Reviewed and consistent with medication use as prescribed.     Pain Treatment History:  Physical Therapy/HEP/Physician Lead exercise program:  Over  "the past 12 months, Patient has done > 10 sessions  PT response: Partially Helpful   Dates of the PT sessions: 2023, 2023  Is Patient participating in home exercise program (HEP): Yes    Non-interventional Pain Therapy:  [x]Chiropractor   []Acupuncture/Dry needle  []TENS unit  [x]Heat/ICE  []Back Brace    Medications previously tried:  NSAIDs: Ibuprofen (Advil/Motrin)  Topical Agent: Yes, biofreeze  TCA/SSRI/SNRI: None  Anti-convulsants: None  Muscle Relaxants: None  Opioids- None    Interventional Pain Procedures:  No    Previous spine/joint surgery:  No    Surgical History:   has a past surgical history that includes TONSILLECTOMY, ADENOIDECTOMY, BILATERAL yringotomy and tubes; Esophagoscopy; [place] tooth extraction; Colonoscopy; and  section (N/A, 2021).  Medical History:   has a past medical history of History of atrial fibrillation.  Family History:  family history is not on file.  Allergies:  Iodine, Allergenic extract-food-shrimp, and Pollen extracts   Social History/SUBSTANCE ABUSE HISTORY:  Personal history of substance abuse: No   reports that she has never smoked. She has never used smokeless tobacco. She reports that she does not currently use alcohol. She reports that she does not use drugs.  LABS:  CBC  Lab Results   Component Value Date    WBC 10.29 2022    HGB 14.1 2022    HCT 43.7 2022     Coagulation Profile   Lab Results   Component Value Date     2022       Lab Results   Component Value Date    INR 1.0 2022     CMP:  BMP  Lab Results   Component Value Date     2022    K 4.2 2022     2022    CO2 20 (L) 2022    BUN 11 2022    CREATININE 0.7 2022    CALCIUM 9.0 2022    ANIONGAP 11 2022     Lab Results   Component Value Date    ALT 16 2022    AST 11 2022    GGT 19 2011    ALKPHOS 87 2022    BILITOT 0.4 2022     HGBA1C:  No results found for: "LABA1C", " ""HGBA1C"    ROS:    Review of Systems   GENERAL:  No weight loss, malaise or fevers.  HEENT:   No recent changes in vision or hearing  NECK:  Negative for lumps, no difficulty with swallowing.  RESPIRATORY:  Negative for cough, wheezing or shortness of breath, patient denies any recent URI.  CARDIOVASCULAR:  Negative for chest pain, leg swelling or palpitations.  GI:  Negative for abdominal discomfort, blood in stools or black stools or change in bowel habits.  MUSCULOSKELETAL:  See HPI.  SKIN:  Negative for lesions, rash, and itching.  PSYCH:  No mood disorder or recent psychosocial stressors.   HEMATOLOGY/LYMPHOLOGY:  Negative for prolonged bleeding, bruising easily or swollen nodes.  Patient is not currently taking any anti-coagulants  NEURO:  See HPI  All other reviewed and negative other than HPI.    PHYSICAL EXAM:  VITALS: /86   Pulse 103   Ht 5' 7" (1.702 m)   Wt 128.4 kg (283 lb 1.6 oz)   SpO2 98%   BMI 44.34 kg/m²   Body mass index is 44.34 kg/m².  GENERAL: Well appearing, in no acute distress, alert and oriented x3, answers questions appropriately.   PSYCH: Flat affect.  SKIN: Skin color, texture, turgor normal, no rashes or lesions.  HEAD/FACE:  Normocephalic, atraumatic. Cranial nerves grossly intact.  CV: Regular rate  PULM: No evidence of respiratory difficulty, symmetric chest rise.  GI:  Soft and non-Distended.    BACK/SIJ/HIP  Lumbar Spine Exam:       Inspection: No erythema, bruising.       Palpation: (+) TTP of lumbar paraspinals bilaterally      ROM:  Limited in flexion, extension, lateral bending.       (+) Facet loading bilaterally      (-) Straight Leg Raise bilaterally      (+) MONTY bilaterally, Tenderness over the PSIS, Yeoman test  Hip Exam:      Inspection: No gross deformity or apparent leg length discrepancy      Palpation:  No TTP to bilateral greater trochanteric bursas.       ROM:  No limitation or pain in internal rotation, external rotation b/l  Neurologic Exam:     " Alert. Speech is fluent and appropriate.     Strength: 5/5 in right/left hip flexion and knee extension, otherwise 5/5 throughout bilateral lower extremities     Sensation:  Grossly intact to light touch in bilateral lower extremities     Tone: No abnormality appreciated in bilateral lower extremities     No Clonus    GAIT:  Normal gait    DIAGNOSTIC STUDIES AND MEDICAL RECORDS REVIEW:        I have personally reviewed and interpreted relevant radiology reports and reviewed relevant records from other services in the EMR.      Lumbar MRI Spine 10/24/2023:  FINDINGS:  Alignment: Normal.     Vertebrae: 5 lumbar-type vertebral bodies. No aggressive marrow replacement process or fracture.     Discs: Disc space narrowing with disc desiccation at the L4-5 and L5-S1 levels.     Cord: Normal.  Conus terminates at .     Degenerative findings:     T12-L1: No significant spinal canal stenosis or neural foraminal narrowing.     L1-L2:   No significant spinal canal stenosis or neural foraminal narrowing.     L2-L3: No significant spinal canal stenosis or neural foraminal narrowing.     L3-L4: No significant spinal canal stenosis or neural foraminal narrowing.     L4-L5: Broad-based posterior disc bulge with superimposed central disc protrusion and facet arthropathy.  No central canal stenosis or neural foraminal narrowing.     L5-S1: Broad-based posterior disc bulge with superimposed central disc protrusion, prominent epidural fat and facet arthropathy.  No central canal stenosis or neural foraminal narrowing is seen.     Paraspinous soft tissues: Normal.     ASSESSMENT:  Joseph Pepper is a 28 y.o. female with the following diagnoses based on history, exam, and imaging:  Problem List Items Addressed This Visit    None  Visit Diagnoses       Chronic sacroiliac joint pain    -  Primary           This is a pleasant 28 y.o. female patient with PMH anxiety, AFib, not on blood thinners, obstructive face apnea, gestational  diabetes, presenting with low back pain consistent with bilateral SIJ pain left worse than right (+Fortan's finger, +PSIS tenderness, +MONTY, +compression, +Yeoman). The patient has undergone conservative therapies, including analgesics and physical therapy more than 10 sessions between August and September 2023, without improvement. Based on the above finding, Patient is Interested in Pursuing bilateral Sacroiliac Joint Injection.      We discussed the underlying diagnoses and multiple treatment options including non-opioid medications, interventional procedures, physical therapy, home exercise, core muscle enhancement, and weight loss.  The risks and benefits of each treatment option were discussed and all questions were answered.      Treatment Plan:    Diagnostics/Referrals: -     Medications:    NSAIDs:  Continue ibuprofen as prescribed  Topical Agent: NA  TCA/SSRI/SNRI: None  Anti-convulsants:  Start gabapentin 100 mg 3 times a day, prescription was provided for 30 days  Muscle Relaxants: None  Opioids: None    Interventional Therapy: Please schedule For Bilateral SIJ injection.    Regarding the above interventions, the patient has been educated regarding the risks (including bleeding, infection, increased pain, nerve damage, or allergic reaction), benefits, and alternatives. The patient states she understands and is eager to proceed.    Physical Rehabilitation: Referral to Physical therapy for Lumbar stabilization, core strengthening, and a home exercise program.    Patient Education: Counseled patient regarding the importance of activity modification, I have stressed the importance of physical activity and a home exercise plan to help with pain and improve health    Follow-up: RTC 4 weeks after injection    May consider: ROSY    I would like to thank Jesusita Cadet for the opportunity to assist in the care of this patient. We had a very nice visit and I look forward to continuing their care. Please  let me know if I can be of further assistance.     Dez Gonzales MD  Anesthesiologist  Interventional Pain Medicine  11/16/2023    Disclaimer:  This note was prepared using voice recognition system and is likely to have sound alike errors that may have been overlooked even after proof reading.  Please call me with any questions.

## 2023-11-17 ENCOUNTER — TELEPHONE (OUTPATIENT)
Dept: PAIN MEDICINE | Facility: CLINIC | Age: 28
End: 2023-11-17
Payer: MEDICAID

## 2023-11-17 DIAGNOSIS — G89.29 CHRONIC SACROILIAC JOINT PAIN: Primary | ICD-10-CM

## 2023-11-17 DIAGNOSIS — M53.3 CHRONIC SACROILIAC JOINT PAIN: Primary | ICD-10-CM

## 2023-11-17 NOTE — TELEPHONE ENCOUNTER
Bilateral SIJ injection scheduled 12/08/2023. Advised that pre admit would contact patient with time of procedure. Advised patient to contact office if she starts any type of antibiotics or new blood thinners. Voiced understanding.

## 2023-11-17 NOTE — TELEPHONE ENCOUNTER
----- Message from Dez Gonzales MD sent at 11/16/2023 10:46 AM CST -----  Please schedule For Bilateral SIJ injection.  Dez Gonzales MD  Anesthesiologist  Interventional Pain Management  11/16/2023

## 2023-11-22 ENCOUNTER — OFFICE VISIT (OUTPATIENT)
Dept: OBSTETRICS AND GYNECOLOGY | Facility: CLINIC | Age: 28
End: 2023-11-22
Payer: MEDICAID

## 2023-11-22 VITALS
HEIGHT: 67 IN | WEIGHT: 282.19 LBS | HEART RATE: 116 BPM | DIASTOLIC BLOOD PRESSURE: 82 MMHG | SYSTOLIC BLOOD PRESSURE: 120 MMHG | BODY MASS INDEX: 44.29 KG/M2

## 2023-11-22 DIAGNOSIS — Z12.4 CERVICAL CANCER SCREENING: ICD-10-CM

## 2023-11-22 DIAGNOSIS — Z01.419 WELL WOMAN EXAM WITH ROUTINE GYNECOLOGICAL EXAM: Primary | ICD-10-CM

## 2023-11-22 PROCEDURE — 99395 PR PREVENTIVE VISIT,EST,18-39: ICD-10-PCS | Mod: S$PBB,,, | Performed by: OBSTETRICS & GYNECOLOGY

## 2023-11-22 PROCEDURE — 99395 PREV VISIT EST AGE 18-39: CPT | Mod: S$PBB,,, | Performed by: OBSTETRICS & GYNECOLOGY

## 2023-11-22 PROCEDURE — 1160F RVW MEDS BY RX/DR IN RCRD: CPT | Mod: CPTII,,, | Performed by: OBSTETRICS & GYNECOLOGY

## 2023-11-22 PROCEDURE — 3008F PR BODY MASS INDEX (BMI) DOCUMENTED: ICD-10-PCS | Mod: CPTII,,, | Performed by: OBSTETRICS & GYNECOLOGY

## 2023-11-22 PROCEDURE — 88175 CYTOPATH C/V AUTO FLUID REDO: CPT | Performed by: OBSTETRICS & GYNECOLOGY

## 2023-11-22 PROCEDURE — 3079F PR MOST RECENT DIASTOLIC BLOOD PRESSURE 80-89 MM HG: ICD-10-PCS | Mod: CPTII,,, | Performed by: OBSTETRICS & GYNECOLOGY

## 2023-11-22 PROCEDURE — 99213 OFFICE O/P EST LOW 20 MIN: CPT | Mod: PBBFAC | Performed by: OBSTETRICS & GYNECOLOGY

## 2023-11-22 PROCEDURE — 3079F DIAST BP 80-89 MM HG: CPT | Mod: CPTII,,, | Performed by: OBSTETRICS & GYNECOLOGY

## 2023-11-22 PROCEDURE — 3074F PR MOST RECENT SYSTOLIC BLOOD PRESSURE < 130 MM HG: ICD-10-PCS | Mod: CPTII,,, | Performed by: OBSTETRICS & GYNECOLOGY

## 2023-11-22 PROCEDURE — 3008F BODY MASS INDEX DOCD: CPT | Mod: CPTII,,, | Performed by: OBSTETRICS & GYNECOLOGY

## 2023-11-22 PROCEDURE — 99999 PR PBB SHADOW E&M-EST. PATIENT-LVL III: ICD-10-PCS | Mod: PBBFAC,,, | Performed by: OBSTETRICS & GYNECOLOGY

## 2023-11-22 PROCEDURE — 99999 PR PBB SHADOW E&M-EST. PATIENT-LVL III: CPT | Mod: PBBFAC,,, | Performed by: OBSTETRICS & GYNECOLOGY

## 2023-11-22 PROCEDURE — 1160F PR REVIEW ALL MEDS BY PRESCRIBER/CLIN PHARMACIST DOCUMENTED: ICD-10-PCS | Mod: CPTII,,, | Performed by: OBSTETRICS & GYNECOLOGY

## 2023-11-22 PROCEDURE — 1159F MED LIST DOCD IN RCRD: CPT | Mod: CPTII,,, | Performed by: OBSTETRICS & GYNECOLOGY

## 2023-11-22 PROCEDURE — 3074F SYST BP LT 130 MM HG: CPT | Mod: CPTII,,, | Performed by: OBSTETRICS & GYNECOLOGY

## 2023-11-22 PROCEDURE — 1159F PR MEDICATION LIST DOCUMENTED IN MEDICAL RECORD: ICD-10-PCS | Mod: CPTII,,, | Performed by: OBSTETRICS & GYNECOLOGY

## 2023-11-22 NOTE — PROGRESS NOTES
Subjective:    Patient ID: Joseph Pepper is a 28 y.o. y.o. female.     Chief Complaint: Annual Well Woman Exam     History of Present Illness:  Joseph presents today for Annual Well Woman exam. She describes her menses as regular every month without intermenstrual spotting and periods are heavy .She denies pelvic pain.  She reports breast tenderness at the site of a right breast mass that she noticed 1 week ago.  She denies nipple discharge. She denies GYN complaints. She denies difficulty with urination or bowel movements. She denies bloating, early satiety, or weight changes. She is sexually active. Contraception is by no method.      Menstrual History:   Patient's last menstrual period was 10/28/2023..     OB History    : 1  Para: 1  Term: 1  : 0  : 0  Livin  Spontaneous : 0  Induced : 0  Ectopic: 0  Live Births: 1            The following portions of the patient's history were reviewed and updated as appropriate: allergies, current medications, past family history, past medical history, past social history, past surgical history, and problem list.    ROS:   Review of Systems   Constitutional:  Negative for chills, diaphoresis, fatigue, fever and unexpected weight change.   HENT:  Negative for congestion, hearing loss, postnasal drip, rhinorrhea, sinus pressure, sinus pain, sore throat and tinnitus.    Eyes:  Negative for pain, discharge and visual disturbance.   Respiratory:  Negative for apnea, cough, shortness of breath and wheezing.    Cardiovascular:  Negative for chest pain, palpitations and leg swelling.   Gastrointestinal:  Negative for abdominal pain, constipation, diarrhea, nausea and vomiting.   Endocrine: Negative for cold intolerance, heat intolerance, polydipsia, polyphagia and polyuria.   Genitourinary:  Negative for difficulty urinating, dyspareunia, dysuria, enuresis, flank pain, frequency, genital sores, hematuria, menstrual problem, pelvic  pain, urgency, vaginal bleeding, vaginal discharge and vaginal pain.   Musculoskeletal:  Positive for back pain. Negative for arthralgias, joint swelling, myalgias, neck pain and neck stiffness.   Skin:  Negative for color change, pallor and rash.   Allergic/Immunologic: Positive for environmental allergies. Negative for food allergies and immunocompromised state.   Neurological:  Negative for dizziness, weakness, light-headedness, numbness and headaches.   Hematological:  Negative for adenopathy. Does not bruise/bleed easily.   Psychiatric/Behavioral:  Negative for agitation and confusion. The patient is not nervous/anxious.          Objective:    Vital Signs:  Vitals:    11/22/23 1059   BP: 120/82   Pulse: (Abnormal) 116       Physical Exam:  General:  alert, cooperative, no distress   Skin:  Skin color, texture, turgor normal. No rashes or lesions   HEENT:  extra ocular movement intact, sclera clear, anicteric   Neck: supple, trachea midline, no adenopathy or thyromegally   Respiratory:  Normal effort   Breasts:  no discharge, erythema, tenderness, or palpable masses; no axillary lymphadenopathy   Abdomen:  soft, nontender, no palpable masses   Pelvis: External genitalia: normal general appearance  Urinary system: urethral meatus normal, bladder nontender  Vaginal: normal mucosa without prolapse or lesions  Cervix: normal appearance  Uterus: normal size, shape, position  Adnexa: normal size, nontender bilaterally   Extremities: Normal ROM; no edema, no cyanosis   Neurologial: Normal strength and tone. No focal numbness or weakness.   Psychiatric: normal mood, speech, dress, and thought processes         Assessment:       Healthy female exam.     1. Well woman exam with routine gynecological exam    2. Cervical cancer screening          Plan:      Well woman exam with routine gynecological exam    Cervical cancer screening  -     Liquid-Based Pap Smear, Screening          COUNSELING:  Joseph was counseled on  JOSUÉ. Pap guidelines and recommendations for yearly pelvic exams in addition to recommendations for monthly self breast exams; to see her PCP for other health maintenance.

## 2023-12-01 LAB
FINAL PATHOLOGIC DIAGNOSIS: NORMAL
Lab: NORMAL

## 2023-12-08 ENCOUNTER — HOSPITAL ENCOUNTER (OUTPATIENT)
Dept: RADIOLOGY | Facility: HOSPITAL | Age: 28
Discharge: HOME | End: 2023-12-08
Attending: ANESTHESIOLOGY
Payer: MEDICAID

## 2023-12-08 ENCOUNTER — HOSPITAL ENCOUNTER (OUTPATIENT)
Facility: HOSPITAL | Age: 28
Discharge: HOME | End: 2023-12-08
Attending: ANESTHESIOLOGY | Admitting: ANESTHESIOLOGY
Payer: MEDICAID

## 2023-12-08 VITALS
SYSTOLIC BLOOD PRESSURE: 108 MMHG | OXYGEN SATURATION: 96 % | TEMPERATURE: 98 F | DIASTOLIC BLOOD PRESSURE: 76 MMHG | RESPIRATION RATE: 16 BRPM | HEART RATE: 101 BPM

## 2023-12-08 DIAGNOSIS — M46.1 SACROILIITIS: Primary | ICD-10-CM

## 2023-12-08 DIAGNOSIS — G89.29 CHRONIC SACROILIAC JOINT PAIN: ICD-10-CM

## 2023-12-08 DIAGNOSIS — M53.3 CHRONIC SACROILIAC JOINT PAIN: ICD-10-CM

## 2023-12-08 DIAGNOSIS — G89.29 CHRONIC PAIN: ICD-10-CM

## 2023-12-08 LAB — B-HCG UR QL: NEGATIVE

## 2023-12-08 PROCEDURE — 27096 INJECT SACROILIAC JOINT: CPT | Mod: LT,,, | Performed by: ANESTHESIOLOGY

## 2023-12-08 PROCEDURE — 25500020 PHARM REV CODE 255: Performed by: ANESTHESIOLOGY

## 2023-12-08 PROCEDURE — 27096 INJECT SACROILIAC JOINT: CPT | Mod: LT | Performed by: ANESTHESIOLOGY

## 2023-12-08 PROCEDURE — 63600175 PHARM REV CODE 636 W HCPCS: Performed by: ANESTHESIOLOGY

## 2023-12-08 PROCEDURE — 81025 URINE PREGNANCY TEST: CPT | Performed by: ANESTHESIOLOGY

## 2023-12-08 PROCEDURE — 25000003 PHARM REV CODE 250: Performed by: ANESTHESIOLOGY

## 2023-12-08 PROCEDURE — 27096 PR INJECTION,SACROILIAC JOINT: ICD-10-PCS | Mod: LT,,, | Performed by: ANESTHESIOLOGY

## 2023-12-08 RX ORDER — LIDOCAINE HYDROCHLORIDE 10 MG/ML
INJECTION INFILTRATION; PERINEURAL
Status: DISCONTINUED | OUTPATIENT
Start: 2023-12-08 | End: 2023-12-08 | Stop reason: HOSPADM

## 2023-12-08 RX ORDER — METHYLPREDNISOLONE ACETATE 40 MG/ML
INJECTION, SUSPENSION INTRA-ARTICULAR; INTRALESIONAL; INTRAMUSCULAR; SOFT TISSUE
Status: DISCONTINUED | OUTPATIENT
Start: 2023-12-08 | End: 2023-12-08 | Stop reason: HOSPADM

## 2023-12-08 RX ORDER — BUPIVACAINE HYDROCHLORIDE 5 MG/ML
INJECTION, SOLUTION EPIDURAL; INTRACAUDAL
Status: DISCONTINUED | OUTPATIENT
Start: 2023-12-08 | End: 2023-12-08 | Stop reason: HOSPADM

## 2023-12-08 NOTE — DISCHARGE SUMMARY
Discharge Note  Short Stay    Admit Date: 12/8/2023    Attending Physician: Dez Gonzales    Discharge Physician: Dez Gonzales    Discharge Date: 12/8/2023 10:58 AM    Procedure: Left SIJ    Final Diagnosis: Chronic sacroiliac joint pain [M53.3, G89.29]    Disposition: Home or self care    Patient Instructions:   Current Discharge Medication List        CONTINUE these medications which have NOT CHANGED    Details   famotidine (PEPCID) 20 MG tablet Take 20 mg by mouth 2 (two) times daily.      gabapentin (NEURONTIN) 100 MG capsule Take 1 capsule (100 mg total) by mouth 3 (three) times daily.  Qty: 90 capsule, Refills: 0      ibuprofen (ADVIL,MOTRIN) 800 MG tablet Take 800 mg by mouth every 6 (six) hours as needed for Pain.      metoprolol tartrate (LOPRESSOR) 100 MG tablet Take 1 tablet (100 mg total) by mouth 2 (two) times daily.  Qty: 60 tablet, Refills: 2    Comments: .             Discharge Diagnosis: Chronic sacroiliac joint pain [M53.3, G89.29]  Condition on Discharge: Stable with no complications to procedure   Diet on Discharge: Same as before.  Activity: as per instruction sheet.  Discharge to: Home with a responsible adult.  Follow up: 2-4 weeks       Please call my office or pager at [phone] if experienced any weakness or loss of sensation, fever > 101.5, pain uncontrolled with oral medications, persistent nausea/vomiting/or diarrhea, redness or drainage from the incisions, or any other worrisome concerns. If physician on call was not reached or could not communicate with our office for any reason please go to the nearest emergency department.     Dez Gonzales  12/08/2023

## 2023-12-08 NOTE — DISCHARGE INSTRUCTIONS
DIET: You may resume your normal diet today.    BATHING: You may resume your normal bathing.          You may shower, no hot water directly on site for 24 hours.    DRESSING: You may remove your bandage today.    ACTIVITY LEVEL: You may resume your normal activities 24 hours after your  procedure.    If you have received sedation or an anesthetic, you may feel sleepy                                                                          for several hours. Rest until you are more awake. Gradually resume your normal activities tomorrow.    If you have received sedation or an anesthetic, do not drive or operate heavy machinery for at least 24 hours.    MEDICATION: You may resume your normal medications today.    You will receive instructions for any pain prescriptions. Pain medications should be taken only as directed.    SPECIAL INSTRUCTIONS: No heat to the injection site for 24 hours including: bath or shower, heating pad, moist heat, hot tubs.    Use ice pack to injection site for any pain or discomfort. Apply ice pack to 20 minutes then remove for 20 minutes before re-applying to site.    WHEN TO CALL DOCTOR: Redness or swelling around injection site    Fever of 101F    Drainage (pus) from the injection site    For any continuous bleeding (some dried blood over the incision is normal).    FOLLOW UP: Follow up phone call will be made by office.    FOR EMERGENCIES: If any unusual problems or difficulties occur during clinic hours, call [phone] or [phone].

## 2023-12-08 NOTE — OP NOTE
Sacroiliac Joint Injection under Fluoroscopic Guidance    The procedure, risks, benefits, and options were discussed with the patient. There are no contraindications to the procedure. The patent expressed understanding and agreed to the procedure. Informed written consent was obtained prior to the start of the procedure and can be found in the patient's chart.    PATIENT NAME: Joseph Pepper   MRN: [id number]     DATE OF PROCEDURE: 12/08/2023    PROCEDURE: left sided Sacroiliac Joint Injection under Fluoroscopic Guidance, had vasovagal reaction so postponed the Right    PRE-OP DIAGNOSIS: Chronic sacroiliac joint pain [M53.3, G89.29]    POST-OP DIAGNOSIS: Same    PHYSICIAN: Dez Gonzales MD    ASSISTANTS: andrea     MEDICATIONS INJECTED: Preservative-free Kenalog 40mg with 5cc of Bupivacine 0.25%     LOCAL ANESTHETIC INJECTED: Xylocaine 1%     SEDATION: None    ESTIMATED BLOOD LOSS: None    COMPLICATIONS: None    TECHNIQUE: Time-out was performed to identify the patient and procedure to be performed. With the patient laying in a prone position, the surgical area was prepped and draped in the usual sterile fashion using ChloraPrep and a fenestrated drape. The sacroiliac joint was determined under fluoroscopy guidance. Skin anesthesia was achieved by injecting Lidocaine 2% over the injection site. The sacroiliac joint was  then approached with a 22 gauge, 3.5 inch spinal quinke needle that was introduced under fluoroscopic guidance in the AP and Lateral views. Once the needle tip was in the area of the joint, and there was no blood, contrast dye Omnipaque (300mg/mL) was injected to confirm placement and there was no vascular runoff. Fluoroscopic imaging in the AP and lateral views revealed a clear outline of the joint space. 4 mL of the medication mixture listed above was injected slowly intraarticular and marci-articular. Displacement of the radio opaque contrast after injection of the medication confirmed that the  medication went into the area of the joint. The needles were removed and bleeding was nil. A sterile dressing was applied. No specimens collected. The patient tolerated the procedure well.     The patient was monitored after the procedure in the recovery area. They were given post-procedure and discharge instructions to follow at home. The patient was discharged in a stable condition.      Dez Gonzales MD

## 2023-12-12 ENCOUNTER — TELEPHONE (OUTPATIENT)
Dept: PAIN MEDICINE | Facility: CLINIC | Age: 28
End: 2023-12-12
Payer: MEDICAID

## 2023-12-12 DIAGNOSIS — G89.29 CHRONIC SACROILIAC JOINT PAIN: Primary | ICD-10-CM

## 2023-12-12 DIAGNOSIS — M53.3 CHRONIC SACROILIAC JOINT PAIN: Primary | ICD-10-CM

## 2023-12-12 NOTE — TELEPHONE ENCOUNTER
----- Message from Dez Gonzales MD sent at 12/11/2023  9:04 PM CST -----  She got Vasovagal, mentioned in my Operative note.  Dez  ----- Message -----  From: Sumaya Grimes, RN  Sent: 12/11/2023  12:58 PM CST  To: Dez Gonzales MD    Why wasn't this completed on the 8th.  ----- Message -----  From: Dez Gonzales MD  Sent: 12/8/2023  10:59 AM CST  To: Sumaya Grimes, EDMUNDO    Please schedule for Right SIJ  Dez Gonzales MD  Anesthesiologist  Interventional Pain Management  12/08/2023

## 2023-12-27 ENCOUNTER — TELEPHONE (OUTPATIENT)
Dept: PAIN MEDICINE | Facility: CLINIC | Age: 28
End: 2023-12-27
Payer: MEDICAID

## 2023-12-27 NOTE — TELEPHONE ENCOUNTER
Spoke with patient. States that she had greater than 80% relief from her Left SIJ on 12/08/2023. States her current pain for the Left side is 1-2/10. State that she is still experiencing pain on her right side, 6-8/10. States she has been doing HEP that was provided by physical therapy. States she did a total of 9 therapy sessions that started on 09/19/2023. Patient scheduled for a right SIJ on 12/29/2023, pending insurance approval.

## 2024-01-05 RX ORDER — IBUPROFEN 800 MG/1
800 TABLET ORAL EVERY 6 HOURS PRN
Qty: 30 TABLET | Refills: 0 | Status: SHIPPED | OUTPATIENT
Start: 2024-01-05

## 2024-01-05 RX ORDER — GABAPENTIN 100 MG/1
100 CAPSULE ORAL 3 TIMES DAILY
Qty: 90 CAPSULE | Refills: 1 | Status: SHIPPED | OUTPATIENT
Start: 2024-01-05

## 2024-01-05 NOTE — TELEPHONE ENCOUNTER
NSAIDs:  Continue ibuprofen as prescribed  Topical Agent: NA  TCA/SSRI/SNRI: None  Anti-convulsants:  Start gabapentin 100 mg 3 times a day, prescription was provided for 30 days

## 2024-01-05 NOTE — TELEPHONE ENCOUNTER
----- Message from Leida Aquino sent at 2024  2:26 PM CST -----  Contact: PATIENT  Joseph Pepper  MRN: [id number]  : 1995  PCP: Jesusita Cadet  Home Phone      Not on file.  Work Phone      Not on file.  Mobile          [phone]      MESSAGE: Patient needs a refill on Gabapentin 100 mg, TID & Ibuprofen 800 mg PRN sent to [facility].        Phone: [phone]

## 2024-01-08 ENCOUNTER — OFFICE VISIT (OUTPATIENT)
Dept: PAIN MEDICINE | Facility: CLINIC | Age: 29
End: 2024-01-08
Payer: MEDICAID

## 2024-01-08 VITALS
OXYGEN SATURATION: 97 % | WEIGHT: 281 LBS | HEART RATE: 107 BPM | HEIGHT: 67 IN | BODY MASS INDEX: 44.1 KG/M2 | SYSTOLIC BLOOD PRESSURE: 110 MMHG | DIASTOLIC BLOOD PRESSURE: 74 MMHG

## 2024-01-08 DIAGNOSIS — M53.3 CHRONIC SACROILIAC JOINT PAIN: Primary | ICD-10-CM

## 2024-01-08 DIAGNOSIS — G89.29 CHRONIC SACROILIAC JOINT PAIN: Primary | ICD-10-CM

## 2024-01-08 PROCEDURE — 3074F SYST BP LT 130 MM HG: CPT | Mod: CPTII,,, | Performed by: ANESTHESIOLOGY

## 2024-01-08 PROCEDURE — 99213 OFFICE O/P EST LOW 20 MIN: CPT | Mod: PBBFAC | Performed by: ANESTHESIOLOGY

## 2024-01-08 PROCEDURE — 1159F MED LIST DOCD IN RCRD: CPT | Mod: CPTII,,, | Performed by: ANESTHESIOLOGY

## 2024-01-08 PROCEDURE — 3008F BODY MASS INDEX DOCD: CPT | Mod: CPTII,,, | Performed by: ANESTHESIOLOGY

## 2024-01-08 PROCEDURE — 1160F RVW MEDS BY RX/DR IN RCRD: CPT | Mod: CPTII,,, | Performed by: ANESTHESIOLOGY

## 2024-01-08 PROCEDURE — 99214 OFFICE O/P EST MOD 30 MIN: CPT | Mod: S$PBB,,, | Performed by: ANESTHESIOLOGY

## 2024-01-08 PROCEDURE — 99999 PR PBB SHADOW E&M-EST. PATIENT-LVL III: CPT | Mod: PBBFAC,,, | Performed by: ANESTHESIOLOGY

## 2024-01-08 PROCEDURE — 3078F DIAST BP <80 MM HG: CPT | Mod: CPTII,,, | Performed by: ANESTHESIOLOGY

## 2024-01-08 NOTE — PROGRESS NOTES
[facility] EST Patient Evaluation    Joseph Pepper  : 1995  Date: 2024     CHIEF COMPLAINT:  No chief complaint on file.    Referring Physician: No ref. provider found    Primary Care Physician: Jesusita Cadet NP    HPI:  This is a 28 y.o. female with a chief complaint of No chief complaint on file.  . The patient has Past medical history/Past surgical history of Afib, BELKIS and chronic low back pain    Patient was evaluated and referred by primary care provider for axial low back pain related to her bilateral SI joint pain   She has a MRI lumbar spine that showed disc bulge/protrusion at L4-L5 and L5-S1 however no significant central or neural foramina narrowing at any level    Interval History 2024:  Joseph Pepper is here for follow up visit.  Patient had left sacroiliac joint injection in 2023, patient could not complain of the right sacroiliac joint injection as she had vasovagal attack.      Diabetic: No    Anticogualtion drugs: Low Dose Aspirin     Current Description of Pain Symptoms:    Onset: Chronic 2023  Pain Location:low back and bilateral SI joint  Radiates/associated symptoms: BLE, Bilateral Buttock with intermittent radiation to the legs below the knee.   Pain is Getting worse over the last 3 months    The pain is described as numbing, sharp, shooting, stabbing, and throbbing.   Exacerbating factors: Sitting, Standing, Bending, Walking, Morning, and Getting out of bed/chair.   Mitigating factors laying down, medications, and rest.   Symptoms interfere with daily activity, sleeping, and .   The patient feels like symptoms have been worsening.   Patient denies night fever/night sweats, urinary incontinence, bowel incontinence, significant weight loss, significant motor weakness, and loss of sensations.    Pain score:   Current: Pain is 6/10    Current pain medications:  Ibuprofen 800 MG, PRN    Current Narcotics/Opioid  /benzo Medications:  Opioids- None  Benzodiazepines: No    UDS:  NA    PDMP:  Reviewed and consistent with medication use as prescribed.     Pain Treatment History:  Physical Therapy/HEP/Physician Lead exercise program:  Over the past 12 months, Patient has done > 10 sessions  PT response: Partially Helpful   Dates of the PT sessions: 2023, 2023  Is Patient participating in home exercise program (HEP): Yes    Non-interventional Pain Therapy:  [x]Chiropractor   []Acupuncture/Dry needle  []TENS unit  [x]Heat/ICE  []Back Brace    Medications previously tried:  NSAIDs: Ibuprofen (Advil/Motrin)  Topical Agent: Yes, biofreeze  TCA/SSRI/SNRI: None  Anti-convulsants: None  Muscle Relaxants: None  Opioids- None    Interventional Pain Procedures:  No    Previous spine/joint surgery:  No    Surgical History:   has a past surgical history that includes TONSILLECTOMY, ADENOIDECTOMY, BILATERAL yringotomy and tubes; Esophagoscopy; Rock tooth extraction; Colonoscopy;  section (N/A, 2021); and injection, sacroiliac joint (Left, 2023).  Medical History:   has a past medical history of History of atrial fibrillation.  Family History:  family history is not on file.  Allergies:  Shrimp, Allergenic extract-food-shrimp, and Pollen extracts   Social History/SUBSTANCE ABUSE HISTORY:  Personal history of substance abuse: No   reports that she has never smoked. She has never used smokeless tobacco. She reports that she does not currently use alcohol. She reports that she does not use drugs.  LABS:  CBC  Lab Results   Component Value Date    WBC 10.29 2022    HGB 14.1 2022    HCT 43.7 2022     Coagulation Profile   Lab Results   Component Value Date     2022       Lab Results   Component Value Date    INR 1.0 2022     CMP:  BMP  Lab Results   Component Value Date     2022    K 4.2 2022     2022    CO2 20 (L) 2022    BUN 11 2022     "CREATININE 0.7 03/30/2022    CALCIUM 9.0 03/30/2022    ANIONGAP 11 03/30/2022     Lab Results   Component Value Date    ALT 16 03/30/2022    AST 11 03/30/2022    GGT 19 05/17/2011    ALKPHOS 87 03/30/2022    BILITOT 0.4 03/30/2022     HGBA1C:  No results found for: "LABA1C", "HGBA1C"    ROS:    Review of Systems   GENERAL:  No weight loss, malaise or fevers.  HEENT:   No recent changes in vision or hearing  NECK:  Negative for lumps, no difficulty with swallowing.  RESPIRATORY:  Negative for cough, wheezing or shortness of breath, patient denies any recent URI.  CARDIOVASCULAR:  Negative for chest pain, leg swelling or palpitations.  GI:  Negative for abdominal discomfort, blood in stools or black stools or change in bowel habits.  MUSCULOSKELETAL:  See HPI.  SKIN:  Negative for lesions, rash, and itching.  PSYCH:  No mood disorder or recent psychosocial stressors.   HEMATOLOGY/LYMPHOLOGY:  Negative for prolonged bleeding, bruising easily or swollen nodes.  Patient is not currently taking any anti-coagulants  NEURO:  See HPI  All other reviewed and negative other than HPI.    PHYSICAL EXAM:  VITALS: There were no vitals taken for this visit.  There is no height or weight on file to calculate BMI.  GENERAL: Well appearing, in no acute distress, alert and oriented x3, answers questions appropriately.   PSYCH: Flat affect.  SKIN: Skin color, texture, turgor normal, no rashes or lesions.  HEAD/FACE:  Normocephalic, atraumatic. Cranial nerves grossly intact.  CV: Regular rate  PULM: No evidence of respiratory difficulty, symmetric chest rise.  GI:  Soft and non-Distended.    BACK/SIJ/HIP  Lumbar Spine Exam:       Inspection: No erythema, bruising.       Palpation: (+) TTP of lumbar paraspinals bilaterally      ROM:  Limited in flexion, extension, lateral bending.       (+) Facet loading bilaterally      (-) Straight Leg Raise bilaterally      (+) MONTY bilaterally, Tenderness over the PSIS, Yeoman test  Hip Exam:      " Inspection: No gross deformity or apparent leg length discrepancy      Palpation:  No TTP to bilateral greater trochanteric bursas.       ROM:  No limitation or pain in internal rotation, external rotation b/l  Neurologic Exam:     Alert. Speech is fluent and appropriate.     Strength: 5/5 in right/left hip flexion and knee extension, otherwise 5/5 throughout bilateral lower extremities     Sensation:  Grossly intact to light touch in bilateral lower extremities     Tone: No abnormality appreciated in bilateral lower extremities     No Clonus    GAIT:  Normal gait    DIAGNOSTIC STUDIES AND MEDICAL RECORDS REVIEW:        I have personally reviewed and interpreted relevant radiology reports and reviewed relevant records from other services in the EMR.      Lumbar MRI Spine 10/24/2023:  FINDINGS:  Alignment: Normal.     Vertebrae: 5 lumbar-type vertebral bodies. No aggressive marrow replacement process or fracture.     Discs: Disc space narrowing with disc desiccation at the L4-5 and L5-S1 levels.     Cord: Normal.  Conus terminates at .     Degenerative findings:     T12-L1: No significant spinal canal stenosis or neural foraminal narrowing.     L1-L2:   No significant spinal canal stenosis or neural foraminal narrowing.     L2-L3: No significant spinal canal stenosis or neural foraminal narrowing.     L3-L4: No significant spinal canal stenosis or neural foraminal narrowing.     L4-L5: Broad-based posterior disc bulge with superimposed central disc protrusion and facet arthropathy.  No central canal stenosis or neural foraminal narrowing.     L5-S1: Broad-based posterior disc bulge with superimposed central disc protrusion, prominent epidural fat and facet arthropathy.  No central canal stenosis or neural foraminal narrowing is seen.     Paraspinous soft tissues: Normal.     ASSESSMENT:  Joseph Pepper is a 28 y.o. female with the following diagnoses based on history, exam, and imaging:  Problem List Items  Addressed This Visit    None       This is a pleasant 28 y.o. female patient with PMH anxiety, AFib, not on blood thinners, obstructive face apnea, gestational diabetes, presenting with low back pain consistent with bilateral SIJ pain left worse than right (+Fortan's finger, +PSIS tenderness, +MONTY, +compression, +Yeoman). The patient has undergone conservative therapies, including analgesics and physical therapy more than 10 sessions between August and September 2023, without improvement. Based on the above finding, Patient is Interested in Pursuing bilateral Sacroiliac Joint Injection.      We discussed the underlying diagnoses and multiple treatment options including non-opioid medications, interventional procedures, physical therapy, home exercise, core muscle enhancement, and weight loss.  The risks and benefits of each treatment option were discussed and all questions were answered.      Treatment Plan:    Diagnostics/Referrals: -     Medications:    NSAIDs:  Continue ibuprofen as prescribed  Topical Agent: NA  TCA/SSRI/SNRI: None  Anti-convulsants:  Start gabapentin 100 mg 3 times a day, prescription was provided for 30 days  Muscle Relaxants: None  Opioids: None    Interventional Therapy: Please schedule For Bilateral SIJ injection.    Regarding the above interventions, the patient has been educated regarding the risks (including bleeding, infection, increased pain, nerve damage, or allergic reaction), benefits, and alternatives. The patient states she understands and is eager to proceed.    Physical Rehabilitation: Referral to Physical therapy for Lumbar stabilization, core strengthening, and a home exercise program.    Patient Education: Counseled patient regarding the importance of activity modification, I have stressed the importance of physical activity and a home exercise plan to help with pain and improve health    Follow-up: RTC 4 weeks after injection    May consider: ROSY Gonzales,  MD  Anesthesiologist  Interventional Pain Medicine  01/08/2024    Disclaimer:  This note was prepared using voice recognition system and is likely to have sound alike errors that may have been overlooked even after proof reading.  Please call me with any questions.

## 2024-01-08 NOTE — PROGRESS NOTES
[facility] EST Patient Evaluation    Joseph Pepper  : 1995  Date: 2024     CHIEF COMPLAINT:  Low-back Pain (Right side)    Referring Physician: No ref. provider found    Primary Care Physician: Jesusita Cadet NP    HPI:  This is a 28 y.o. female with a chief complaint of Low-back Pain (Right side)  . The patient has Past medical history/Past surgical history of Afib, BELKIS and chronic low back pain    Patient was evaluated and referred by primary care provider for axial low back pain related to her bilateral SI joint pain   She has a MRI lumbar spine that showed disc bulge/protrusion at L4-L5 and L5-S1 however no significant central or neural foramina narrowing at any level    Interval History 2024:  Joseph Pepper is here for follow up visit. Still with lower back pain. Patient had left sacroiliac joint injection in 2023 with more than 90% improvement in her pain and functionality after the injection, patient could not complete of the right sacroiliac joint injection as she had vasovagal attack.  Today, she is complaining of right sacroiliac joint pain in addition to tailbone pain    Diabetic: No    Anticogualtion drugs: Low Dose Aspirin     Current Description of Pain Symptoms:    Onset: Chronic 2023  Pain Location:  Right sacroiliac joint pain and tailbone pain  Radiates/associated symptoms:  Right buttock with intermittent radiation to the legs below the knee.   Pain is Getting worse over the last 3 months    The pain is described as numbing, sharp, shooting, stabbing, and throbbing.   Exacerbating factors: Sitting, Standing, Bending, Walking, Morning, and Getting out of bed/chair.   Mitigating factors laying down, medications, and rest.   Symptoms interfere with daily activity, sleeping, and .   The patient feels like symptoms have been worsening.   Patient denies night fever/night sweats, urinary incontinence, bowel incontinence,  significant weight loss, significant motor weakness, and loss of sensations.    Pain score:   Current: Pain is 8/10    Current pain medications:  Ibuprofen 800 MG, PRN  Gabapentin 100mg TID    Current Narcotics/Opioid /benzo Medications:  Opioids- None  Benzodiazepines: No    UDS:  NA    PDMP:  Reviewed and consistent with medication use as prescribed.     Pain Treatment History:  Physical Therapy/HEP/Physician Lead exercise program:  Over the past 12 months, Patient has done > 10 sessions  PT response: Partially Helpful   Dates of the PT sessions: 2023, 2023  Is Patient participating in home exercise program (HEP): Yes    Non-interventional Pain Therapy:  [x]Chiropractor   []Acupuncture/Dry needle  []TENS unit  [x]Heat/ICE  []Back Brace    Medications previously tried:  NSAIDs: Ibuprofen (Advil/Motrin)  Topical Agent: Yes, biofreeze  TCA/SSRI/SNRI: None  Anti-convulsants: None  Muscle Relaxants: None  Opioids- None    Interventional Pain Procedures:  Left sacroiliac joint injection 2023, more than 90% improvement in pain and functionality    Previous spine/joint surgery:  No    Surgical History:   has a past surgical history that includes TONSILLECTOMY, ADENOIDECTOMY, BILATERAL yringotomy and tubes; Esophagoscopy; [place] tooth extraction; Colonoscopy;  section (N/A, 2021); and injection, sacroiliac joint (Left, 2023).  Medical History:   has a past medical history of History of atrial fibrillation.  Family History:  family history is not on file.  Allergies:  Shrimp, Allergenic extract-food-shrimp, and Pollen extracts   Social History/SUBSTANCE ABUSE HISTORY:  Personal history of substance abuse: No   reports that she has never smoked. She has never used smokeless tobacco. She reports that she does not currently use alcohol. She reports that she does not use drugs.  LABS:  CBC  Lab Results   Component Value Date    WBC 10.29 2022    HGB 14.1 2022    HCT 43.7 2022  "    Coagulation Profile   Lab Results   Component Value Date     03/29/2022       Lab Results   Component Value Date    INR 1.0 03/28/2022     CMP:  BMP  Lab Results   Component Value Date     03/30/2022    K 4.2 03/30/2022     03/30/2022    CO2 20 (L) 03/30/2022    BUN 11 03/30/2022    CREATININE 0.7 03/30/2022    CALCIUM 9.0 03/30/2022    ANIONGAP 11 03/30/2022     Lab Results   Component Value Date    ALT 16 03/30/2022    AST 11 03/30/2022    GGT 19 05/17/2011    ALKPHOS 87 03/30/2022    BILITOT 0.4 03/30/2022     HGBA1C:  No results found for: "LABA1C", "HGBA1C"    ROS:    Review of Systems   GENERAL:  No weight loss, malaise or fevers.  HEENT:   No recent changes in vision or hearing  NECK:  Negative for lumps, no difficulty with swallowing.  RESPIRATORY:  Negative for cough, wheezing or shortness of breath, patient denies any recent URI.  CARDIOVASCULAR:  Negative for chest pain, leg swelling or palpitations.  GI:  Negative for abdominal discomfort, blood in stools or black stools or change in bowel habits.  MUSCULOSKELETAL:  See HPI.  SKIN:  Negative for lesions, rash, and itching.  PSYCH:  No mood disorder or recent psychosocial stressors.   HEMATOLOGY/LYMPHOLOGY:  Negative for prolonged bleeding, bruising easily or swollen nodes.  Patient is not currently taking any anti-coagulants  NEURO:  See HPI  All other reviewed and negative other than HPI.    PHYSICAL EXAM:  VITALS: /74   Pulse 107   Ht 5' 7" (1.702 m)   Wt 127.5 kg (281 lb)   SpO2 97%   BMI 44.01 kg/m²   Body mass index is 44.01 kg/m².  GENERAL: Well appearing, in no acute distress, alert and oriented x3, answers questions appropriately.   PSYCH: Flat affect.  SKIN: Skin color, texture, turgor normal, no rashes or lesions.  HEAD/FACE:  Normocephalic, atraumatic. Cranial nerves grossly intact.  CV: Regular rate  PULM: No evidence of respiratory difficulty, symmetric chest rise.  GI:  Soft and " non-Distended.    BACK/SIJ/HIP  Lumbar Spine Exam:       Inspection: No erythema, bruising.       Palpation: (+) TTP of lumbar paraspinals bilaterally      ROM:  Limited in flexion, extension, lateral bending.       (+) Facet loading bilaterally      (-) Straight Leg Raise bilaterally      (+) MONTY bilaterally, Tenderness over the PSIS, Yeoman test  Hip Exam:      Inspection: No gross deformity or apparent leg length discrepancy      Palpation:  No TTP to bilateral greater trochanteric bursas.       ROM:  No limitation or pain in internal rotation, external rotation b/l  Neurologic Exam:     Alert. Speech is fluent and appropriate.     Strength: 5/5 in right/left hip flexion and knee extension, otherwise 5/5 throughout bilateral lower extremities     Sensation:  Grossly intact to light touch in bilateral lower extremities     Tone: No abnormality appreciated in bilateral lower extremities     No Clonus    GAIT:  Normal gait    DIAGNOSTIC STUDIES AND MEDICAL RECORDS REVIEW:        I have personally reviewed and interpreted relevant radiology reports and reviewed relevant records from other services in the EMR.      Lumbar MRI Spine 10/24/2023:  FINDINGS:  Alignment: Normal.     Vertebrae: 5 lumbar-type vertebral bodies. No aggressive marrow replacement process or fracture.     Discs: Disc space narrowing with disc desiccation at the L4-5 and L5-S1 levels.     Cord: Normal.  Conus terminates at .     Degenerative findings:     T12-L1: No significant spinal canal stenosis or neural foraminal narrowing.     L1-L2:   No significant spinal canal stenosis or neural foraminal narrowing.     L2-L3: No significant spinal canal stenosis or neural foraminal narrowing.     L3-L4: No significant spinal canal stenosis or neural foraminal narrowing.     L4-L5: Broad-based posterior disc bulge with superimposed central disc protrusion and facet arthropathy.  No central canal stenosis or neural foraminal narrowing.     L5-S1:  Broad-based posterior disc bulge with superimposed central disc protrusion, prominent epidural fat and facet arthropathy.  No central canal stenosis or neural foraminal narrowing is seen.     Paraspinous soft tissues: Normal.     ASSESSMENT:  Joseph Pepper is a 28 y.o. female with the following diagnoses based on history, exam, and imaging:  Problem List Items Addressed This Visit    None  Visit Diagnoses       Chronic sacroiliac joint pain    -  Primary             This is a pleasant 28 y.o. female patient with PMH anxiety, AFib, not on blood thinners, obstructive sleep apnea, gestational diabetes, presenting with low back pain consistent with right sacroiliac joint pain (+Fortan's finger, +PSIS tenderness, +MONTY, +compression, +Yeoman). The patient has undergone conservative therapies, including analgesics and physical therapy more than 10 sessions between August and September 2023, without improvement, she has completed left sacroiliac joint pain with more than 90% improvement in pain and functionality, patient would like to repeat same injection of the right.     Treatment Plan:    Diagnostics/Referrals: -     Medications:    NSAIDs:  Continue ibuprofen as prescribed  Topical Agent: NA  TCA/SSRI/SNRI: None  Anti-convulsants:  continue gabapentin 100 mg 3 times a day, may call for refill   Muscle Relaxants: None  Opioids: None    Interventional Therapy: Please schedule For RTl SIJ injection.    Regarding the above interventions, the patient has been educated regarding the risks (including bleeding, infection, increased pain, nerve damage, or allergic reaction), benefits, and alternatives. The patient states she understands and is eager to proceed.    Physical Rehabilitation: Referral to Physical therapy for Lumbar stabilization, core strengthening, and a home exercise program.    Patient Education: Counseled patient regarding the importance of activity modification, I have stressed the importance of  physical activity and a home exercise plan to help with pain and improve health    Follow-up: RTC 4 weeks after injection    May consider: Ganglion Impar for tailbone pain.      Dez Gonzales MD  Anesthesiologist  Interventional Pain Medicine  01/08/2024    Disclaimer:  This note was prepared using voice recognition system and is likely to have sound alike errors that may have been overlooked even after proof reading.  Please call me with any questions.

## 2024-02-05 DIAGNOSIS — M53.3 CHRONIC SACROILIAC JOINT PAIN: Primary | ICD-10-CM

## 2024-02-05 DIAGNOSIS — G89.29 CHRONIC SACROILIAC JOINT PAIN: Primary | ICD-10-CM

## 2024-02-08 NOTE — PRE-PROCEDURE INSTRUCTIONS
Patient scheduled for pain management procedure with Dr. Gonzales at [facility] on Friday, 2/9/24.  Denies any known infection or use of antibiotics in past 2 weeks.  Denies vaccines in last 2 weeks.   Denies implanted devices such as pacemaker, stimulator, etc  Instructed to arrive at 1145am.  Encouraged to eat light breakfast, instructed to take all morning medications.  Must have transportation home.     Verbalizes understanding.

## 2024-02-09 ENCOUNTER — HOSPITAL ENCOUNTER (OUTPATIENT)
Dept: RADIOLOGY | Facility: HOSPITAL | Age: 29
Discharge: HOME | End: 2024-02-09
Attending: ANESTHESIOLOGY | Admitting: ANESTHESIOLOGY
Payer: MEDICAID

## 2024-02-09 ENCOUNTER — HOSPITAL ENCOUNTER (OUTPATIENT)
Facility: HOSPITAL | Age: 29
Discharge: HOME | End: 2024-02-09
Attending: ANESTHESIOLOGY | Admitting: ANESTHESIOLOGY
Payer: MEDICAID

## 2024-02-09 VITALS
OXYGEN SATURATION: 95 % | HEART RATE: 97 BPM | RESPIRATION RATE: 15 BRPM | SYSTOLIC BLOOD PRESSURE: 116 MMHG | DIASTOLIC BLOOD PRESSURE: 81 MMHG

## 2024-02-09 DIAGNOSIS — G89.29 CHRONIC SACROILIAC JOINT PAIN: ICD-10-CM

## 2024-02-09 DIAGNOSIS — G89.29 CHRONIC PAIN: ICD-10-CM

## 2024-02-09 DIAGNOSIS — M46.1 SACROILIITIS: Primary | ICD-10-CM

## 2024-02-09 DIAGNOSIS — M53.3 CHRONIC SACROILIAC JOINT PAIN: ICD-10-CM

## 2024-02-09 PROCEDURE — 25500020 PHARM REV CODE 255: Performed by: ANESTHESIOLOGY

## 2024-02-09 PROCEDURE — 27096 INJECT SACROILIAC JOINT: CPT | Mod: RT | Performed by: ANESTHESIOLOGY

## 2024-02-09 PROCEDURE — 76000 FLUOROSCOPY <1 HR PHYS/QHP: CPT | Mod: TC,59

## 2024-02-09 PROCEDURE — 25000003 PHARM REV CODE 250: Performed by: ANESTHESIOLOGY

## 2024-02-09 PROCEDURE — 63600175 PHARM REV CODE 636 W HCPCS: Mod: JZ,TB | Performed by: ANESTHESIOLOGY

## 2024-02-09 PROCEDURE — 27096 INJECT SACROILIAC JOINT: CPT | Mod: RT,,, | Performed by: ANESTHESIOLOGY

## 2024-02-09 RX ORDER — BUPIVACAINE HYDROCHLORIDE 5 MG/ML
INJECTION, SOLUTION EPIDURAL; INTRACAUDAL
Status: DISCONTINUED | OUTPATIENT
Start: 2024-02-09 | End: 2024-02-09 | Stop reason: HOSPADM

## 2024-02-09 RX ORDER — METHYLPREDNISOLONE ACETATE 40 MG/ML
INJECTION, SUSPENSION INTRA-ARTICULAR; INTRALESIONAL; INTRAMUSCULAR; SOFT TISSUE
Status: DISCONTINUED | OUTPATIENT
Start: 2024-02-09 | End: 2024-02-09 | Stop reason: HOSPADM

## 2024-02-09 RX ORDER — LIDOCAINE HYDROCHLORIDE 10 MG/ML
INJECTION INFILTRATION; PERINEURAL
Status: DISCONTINUED | OUTPATIENT
Start: 2024-02-09 | End: 2024-02-09 | Stop reason: HOSPADM

## 2024-02-09 NOTE — DISCHARGE INSTRUCTIONS
DIET: You may resume your normal diet today.    BATHING: You may resume your normal bathing.          You may shower, no hot water directly on site for 24 hours.    DRESSING: You may remove your bandage today.    ACTIVITY LEVEL: You may resume your normal activities 24 hours after your  procedure.    If you have received sedation or an anesthetic, you may feel sleepy for several hours. Rest until you are more awake. Gradually resume your normal activities tomorrow.    If you have received sedation or an anesthetic, do not drive or operate heavy machinery for at least 24 hours.    MEDICATION: You may resume your normal medications today.    You will receive instructions for any pain prescriptions. Pain medications should be taken only as directed.    SPECIAL INSTRUCTIONS: No heat to the injection site for 24 hours including: bath or shower, heating pad, moist heat, hot tubs.    Use ice pack to injection site for any pain or discomfort. Apply ice pack to 20 minutes then remove for 20 minutes before re-applying to site.    WHEN TO CALL DOCTOR: Redness or swelling around injection site    Fever of 101F    Drainage (pus) from the injection site    For any continuous bleeding (some dried blood over the incision is normal).    FOLLOW UP: Follow up phone call will be made by office.    FOR EMERGENCIES: If any unusual problems or difficulties occur during clinic hours, call [phone] or [phone].

## 2024-02-09 NOTE — DISCHARGE SUMMARY
Discharge Note  Short Stay    Admit Date: 2/9/2024    Attending Physician: Dez Gonzales    Discharge Physician: Dez Gonzales    Discharge Date: 2/9/2024 12:31 PM    Procedure(s) (LRB):  INJECTION,SACROILIAC JOINT (Right)    Final Diagnosis: Chronic sacroiliac joint pain [M53.3, G89.29]    Disposition: Home or self care    Patient Instructions:   Current Discharge Medication List        CONTINUE these medications which have NOT CHANGED    Details   famotidine (PEPCID) 20 MG tablet Take 20 mg by mouth 2 (two) times daily.      gabapentin (NEURONTIN) 100 MG capsule Take 1 capsule (100 mg total) by mouth 3 (three) times daily.  Qty: 90 capsule, Refills: 1      ibuprofen (ADVIL,MOTRIN) 800 MG tablet Take 1 tablet (800 mg total) by mouth every 6 (six) hours as needed for Pain.  Qty: 30 tablet, Refills: 0      metoprolol tartrate (LOPRESSOR) 100 MG tablet Take 1 tablet (100 mg total) by mouth 2 (two) times daily.  Qty: 60 tablet, Refills: 2    Comments: .             Discharge Diagnosis: Chronic sacroiliac joint pain [M53.3, G89.29]  Condition on Discharge: Stable with no complications to procedure   Diet on Discharge: Same as before.  Activity: as per instruction sheet.  Discharge to: Home with a responsible adult.  Follow up: 2-4 weeks       Please call my office or pager at [phone] if experienced any weakness or loss of sensation, fever > 101.5, pain uncontrolled with oral medications, persistent nausea/vomiting/or diarrhea, redness or drainage from the incisions, or any other worrisome concerns. If physician on call was not reached or could not communicate with our office for any reason please go to the nearest emergency department.     Dez Gonzales  02/09/2024

## 2024-02-09 NOTE — H&P
HPI  Patient presenting for Procedure(s) (LRB):  INJECTION,SACROILIAC JOINT (Right)     Patient on Anti-coagulation No    Patient is not on antibiotic for the last 2 weeks    Patient has a Ride Home assistance.    No health changes since previous encounter    Past Medical History:   Diagnosis Date    History of atrial fibrillation      Past Surgical History:   Procedure Laterality Date     SECTION N/A 2021    Procedure: PRIMARY  SECTION;  Surgeon: Mehran Fish MD;  Location: [facility] OR;  Service: OB/GYN;  Laterality: N/A;    COLONOSCOPY      ESOPHAGOSCOPY      INJECTION, SACROILIAC JOINT Left 2023    Procedure: INJECTION,SACROILIAC JOINT;  Surgeon: Dze Gonzaels MD;  Location: [facility] OR;  Service: Pain Management;  Laterality: Left;    TONSILLECTOMY, ADENOIDECTOMY, BILATERAL MYRINGOTOMY AND TUBES      WISDOM TOOTH EXTRACTION       Review of patient's allergies indicates:   Allergen Reactions    Shrimp Edema    Allergenic extract-food-shrimp Edema    Pollen extracts      General allergic rhinitis       No current facility-administered medications for this encounter.     Current Outpatient Medications   Medication Sig    famotidine (PEPCID) 20 MG tablet Take 20 mg by mouth 2 (two) times daily.    gabapentin (NEURONTIN) 100 MG capsule Take 1 capsule (100 mg total) by mouth 3 (three) times daily.    ibuprofen (ADVIL,MOTRIN) 800 MG tablet Take 1 tablet (800 mg total) by mouth every 6 (six) hours as needed for Pain.    metoprolol tartrate (LOPRESSOR) 100 MG tablet Take 1 tablet (100 mg total) by mouth 2 (two) times daily. (Patient not taking: Reported on 2024)       PMHx, PSHx, Allergies, Medications reviewed in epic    ROS negative except pain complaints in HPI    OBJECTIVE:    /81   Pulse 97   Resp 15   SpO2 95%   Breastfeeding No     PHYSICAL EXAMINATION:    GENERAL: Well appearing, in no acute distress, alert and oriented x3.  PSYCH:  Mood and affect appropriate.  SKIN: Skin  color, texture, turgor normal, no rashes or lesions which will impact the procedure.  CV: RRR with palpation of the radial artery.  PULM: No evidence of respiratory difficulty, symmetric chest rise. Clear to auscultation.  NEURO: Cranial nerves grossly intact.    Plan:    Proceed with procedure as planned Procedure(s) (LRB):  INJECTION,SACROILIAC JOINT (Right)    Dez Gonzales  Interventional Pain  02/09/2024

## 2025-07-28 PROCEDURE — 80061 LIPID PANEL: CPT | Performed by: PSYCHIATRY & NEUROLOGY

## 2025-07-28 PROCEDURE — 36415 COLL VENOUS BLD VENIPUNCTURE: CPT | Performed by: PSYCHIATRY & NEUROLOGY

## 2025-07-28 PROCEDURE — 83036 HEMOGLOBIN GLYCOSYLATED A1C: CPT | Performed by: PSYCHIATRY & NEUROLOGY

## 2025-07-29 ENCOUNTER — HOSPITAL ENCOUNTER (INPATIENT)
Facility: HOSPITAL | Age: 30
LOS: 1 days | Discharge: HOME | DRG: 882 | End: 2025-07-30
Attending: PSYCHIATRY & NEUROLOGY | Admitting: PSYCHIATRY & NEUROLOGY
Payer: MEDICAID

## 2025-07-29 DIAGNOSIS — F43.23 ADJUSTMENT DISORDER WITH MIXED ANXIETY AND DEPRESSED MOOD: Primary | ICD-10-CM

## 2025-07-29 DIAGNOSIS — F32.A DEPRESSION: ICD-10-CM

## 2025-07-29 PROBLEM — N39.0 UTI (URINARY TRACT INFECTION): Status: ACTIVE | Noted: 2025-07-29

## 2025-07-29 LAB
CHOLEST SERPL-MCNC: 214 MG/DL (ref 120–199)
CHOLEST/HDLC SERPL: 7.1 {RATIO} (ref 2–5)
EAG (OHS): 120 MG/DL (ref 68–131)
HBA1C MFR BLD: 5.8 % (ref 4–5.6)
HDLC SERPL-MCNC: 30 MG/DL (ref 40–75)
HDLC SERPL: 14 % (ref 20–50)
LDLC SERPL CALC-MCNC: 155.8 MG/DL (ref 63–159)
NONHDLC SERPL-MCNC: 184 MG/DL
TRIGL SERPL-MCNC: 141 MG/DL (ref 30–150)

## 2025-07-29 PROCEDURE — 99222 1ST HOSP IP/OBS MODERATE 55: CPT | Mod: ,,, | Performed by: PSYCHIATRY & NEUROLOGY

## 2025-07-29 PROCEDURE — 25000003 PHARM REV CODE 250: Performed by: PSYCHIATRY & NEUROLOGY

## 2025-07-29 PROCEDURE — 11400000 HC PSYCH PRIVATE ROOM

## 2025-07-29 PROCEDURE — 99231 SBSQ HOSP IP/OBS SF/LOW 25: CPT | Mod: SA,HB,, | Performed by: PHYSICIAN ASSISTANT

## 2025-07-29 PROCEDURE — 25000003 PHARM REV CODE 250: Performed by: PHYSICIAN ASSISTANT

## 2025-07-29 PROCEDURE — 90833 PSYTX W PT W E/M 30 MIN: CPT | Mod: ,,, | Performed by: PSYCHIATRY & NEUROLOGY

## 2025-07-29 RX ORDER — DIPHENHYDRAMINE HCL 50 MG
50 CAPSULE ORAL EVERY 4 HOURS PRN
Status: DISCONTINUED | OUTPATIENT
Start: 2025-07-29 | End: 2025-07-29

## 2025-07-29 RX ORDER — IBUPROFEN 200 MG
1 TABLET ORAL DAILY PRN
Status: DISCONTINUED | OUTPATIENT
Start: 2025-07-29 | End: 2025-07-30 | Stop reason: HOSPADM

## 2025-07-29 RX ORDER — OLANZAPINE 10 MG/1
10 TABLET, FILM COATED ORAL EVERY 8 HOURS PRN
Status: DISCONTINUED | OUTPATIENT
Start: 2025-07-29 | End: 2025-07-30 | Stop reason: HOSPADM

## 2025-07-29 RX ORDER — ONDANSETRON 4 MG/1
4 TABLET, ORALLY DISINTEGRATING ORAL EVERY 8 HOURS PRN
Status: DISCONTINUED | OUTPATIENT
Start: 2025-07-29 | End: 2025-07-30 | Stop reason: HOSPADM

## 2025-07-29 RX ORDER — ACETAMINOPHEN 325 MG/1
650 TABLET ORAL EVERY 6 HOURS PRN
Status: DISCONTINUED | OUTPATIENT
Start: 2025-07-29 | End: 2025-07-30 | Stop reason: HOSPADM

## 2025-07-29 RX ORDER — IBUPROFEN 200 MG
1 TABLET ORAL DAILY PRN
Status: DISCONTINUED | OUTPATIENT
Start: 2025-07-29 | End: 2025-07-29

## 2025-07-29 RX ORDER — TALC
6 POWDER (GRAM) TOPICAL NIGHTLY PRN
Status: DISCONTINUED | OUTPATIENT
Start: 2025-07-29 | End: 2025-07-29

## 2025-07-29 RX ORDER — HALOPERIDOL 5 MG/1
5 TABLET ORAL EVERY 4 HOURS PRN
Status: DISCONTINUED | OUTPATIENT
Start: 2025-07-29 | End: 2025-07-29

## 2025-07-29 RX ORDER — PROMETHAZINE HYDROCHLORIDE 25 MG/1
25 TABLET ORAL EVERY 6 HOURS PRN
Status: DISCONTINUED | OUTPATIENT
Start: 2025-07-29 | End: 2025-07-30 | Stop reason: HOSPADM

## 2025-07-29 RX ORDER — HALOPERIDOL LACTATE 5 MG/ML
5 INJECTION, SOLUTION INTRAMUSCULAR EVERY 4 HOURS PRN
Status: DISCONTINUED | OUTPATIENT
Start: 2025-07-29 | End: 2025-07-29

## 2025-07-29 RX ORDER — OLANZAPINE 10 MG/2ML
10 INJECTION, POWDER, FOR SOLUTION INTRAMUSCULAR EVERY 8 HOURS PRN
Status: DISCONTINUED | OUTPATIENT
Start: 2025-07-29 | End: 2025-07-30 | Stop reason: HOSPADM

## 2025-07-29 RX ORDER — BENZTROPINE MESYLATE 1 MG/ML
2 INJECTION, SOLUTION INTRAMUSCULAR; INTRAVENOUS EVERY 8 HOURS PRN
Status: DISCONTINUED | OUTPATIENT
Start: 2025-07-29 | End: 2025-07-30 | Stop reason: HOSPADM

## 2025-07-29 RX ORDER — HYDROXYZINE PAMOATE 50 MG/1
50 CAPSULE ORAL EVERY 6 HOURS PRN
Status: DISCONTINUED | OUTPATIENT
Start: 2025-07-29 | End: 2025-07-30 | Stop reason: HOSPADM

## 2025-07-29 RX ORDER — LORAZEPAM 1 MG/1
2 TABLET ORAL EVERY 4 HOURS PRN
Status: DISCONTINUED | OUTPATIENT
Start: 2025-07-29 | End: 2025-07-29

## 2025-07-29 RX ORDER — DIPHENHYDRAMINE HYDROCHLORIDE 50 MG/ML
50 INJECTION, SOLUTION INTRAMUSCULAR; INTRAVENOUS EVERY 4 HOURS PRN
Status: DISCONTINUED | OUTPATIENT
Start: 2025-07-29 | End: 2025-07-29

## 2025-07-29 RX ORDER — LORAZEPAM 2 MG/ML
2 INJECTION INTRAMUSCULAR EVERY 4 HOURS PRN
Status: DISCONTINUED | OUTPATIENT
Start: 2025-07-29 | End: 2025-07-29

## 2025-07-29 RX ORDER — HYDROXYZINE PAMOATE 50 MG/1
50 CAPSULE ORAL EVERY 6 HOURS PRN
Status: DISCONTINUED | OUTPATIENT
Start: 2025-07-29 | End: 2025-07-29

## 2025-07-29 RX ORDER — CALCIUM CARBONATE 200(500)MG
1000 TABLET,CHEWABLE ORAL EVERY 8 HOURS PRN
Status: DISCONTINUED | OUTPATIENT
Start: 2025-07-29 | End: 2025-07-29

## 2025-07-29 RX ORDER — BENZONATATE 100 MG/1
100 CAPSULE ORAL 3 TIMES DAILY PRN
Status: DISCONTINUED | OUTPATIENT
Start: 2025-07-29 | End: 2025-07-30 | Stop reason: HOSPADM

## 2025-07-29 RX ORDER — ALUMINUM HYDROXIDE, MAGNESIUM HYDROXIDE, AND SIMETHICONE 1200; 120; 1200 MG/30ML; MG/30ML; MG/30ML
30 SUSPENSION ORAL EVERY 6 HOURS PRN
Status: DISCONTINUED | OUTPATIENT
Start: 2025-07-29 | End: 2025-07-30 | Stop reason: HOSPADM

## 2025-07-29 RX ORDER — ACETAMINOPHEN 325 MG/1
650 TABLET ORAL EVERY 6 HOURS PRN
Status: DISCONTINUED | OUTPATIENT
Start: 2025-07-29 | End: 2025-07-29

## 2025-07-29 RX ORDER — CEPHALEXIN 500 MG/1
500 CAPSULE ORAL EVERY 8 HOURS
Status: DISCONTINUED | OUTPATIENT
Start: 2025-07-29 | End: 2025-07-30 | Stop reason: HOSPADM

## 2025-07-29 RX ORDER — GUAIFENESIN 100 MG/5ML
200 LIQUID ORAL EVERY 4 HOURS PRN
Status: DISCONTINUED | OUTPATIENT
Start: 2025-07-29 | End: 2025-07-29

## 2025-07-29 RX ORDER — LOPERAMIDE HYDROCHLORIDE 2 MG/1
2 CAPSULE ORAL
Status: DISCONTINUED | OUTPATIENT
Start: 2025-07-29 | End: 2025-07-30 | Stop reason: HOSPADM

## 2025-07-29 RX ORDER — ONDANSETRON 4 MG/1
4 TABLET, ORALLY DISINTEGRATING ORAL EVERY 8 HOURS PRN
Status: DISCONTINUED | OUTPATIENT
Start: 2025-07-29 | End: 2025-07-29

## 2025-07-29 RX ADMIN — PROMETHAZINE HYDROCHLORIDE 25 MG: 25 TABLET ORAL at 08:07

## 2025-07-29 RX ADMIN — CEPHALEXIN 500 MG: 500 CAPSULE ORAL at 01:07

## 2025-07-29 RX ADMIN — HYDROXYZINE PAMOATE 50 MG: 50 CAPSULE ORAL at 08:07

## 2025-07-29 RX ADMIN — CEPHALEXIN 500 MG: 500 CAPSULE ORAL at 09:07

## 2025-07-29 RX ADMIN — ONDANSETRON 4 MG: 4 TABLET, ORALLY DISINTEGRATING ORAL at 04:07

## 2025-07-29 NOTE — CONSULTS
[facility] Medicine  Consult Note    Patient Name: Joseph Pepper  MRN: [id number]  Admission Date: 2025  Hospital Length of Stay: 0 days  Attending Physician: Yoni Ivey MD   Primary Care Provider: Jesusita Cadet NP           Patient information was obtained from patient and ER records.     Inpatient consult to [facility] for History and Physical  Consult performed by: Sharon Flood PA-C  Consult ordered by: Yoni Ivey MD        Subjective:     Principal Problem: Adjustment disorder with mixed anxiety and depressed mood    Chief Complaint: No chief complaint on file.       HPI: Patient is a 29 year old female who was admitted to [facility] for suicidal ideation.  Hospital medicine consulted for medical management.        Past Medical History:   Diagnosis Date    History of atrial fibrillation        Past Surgical History:   Procedure Laterality Date     SECTION N/A 2021    Procedure: PRIMARY  SECTION;  Surgeon: Mehran Fish MD;  Location: [facility] OR;  Service: OB/GYN;  Laterality: N/A;    COLONOSCOPY      ESOPHAGOSCOPY      INJECTION, SACROILIAC JOINT Left 2023    Procedure: INJECTION,SACROILIAC JOINT;  Surgeon: Dez Gonzales MD;  Location: [facility] OR;  Service: Pain Management;  Laterality: Left;    INJECTION, SACROILIAC JOINT Right 2024    Procedure: INJECTION,SACROILIAC JOINT;  Surgeon: Dez Gonzales MD;  Location: [facility] OR;  Service: Pain Management;  Laterality: Right;    TONSILLECTOMY, ADENOIDECTOMY, BILATERAL MYRINGOTOMY AND TUBES      WISDOM TOOTH EXTRACTION         Review of patient's allergies indicates:   Allergen Reactions    Shrimp Edema    Allergenic extract-food-shrimp Edema    Pollen extracts      General allergic rhinitis        Current Facility-Administered Medications on File Prior to Encounter   Medication    [COMPLETED] acetaminophen tablet 1,000 mg    [COMPLETED] cefTRIAXone injection  1 g    [COMPLETED] ibuprofen tablet 800 mg    [DISCONTINUED] diphenhydrAMINE injection 50 mg    [DISCONTINUED] haloperidol lactate injection 5 mg    [DISCONTINUED] LORazepam injection 2 mg     Current Outpatient Medications on File Prior to Encounter   Medication Sig    famotidine (PEPCID) 20 MG tablet Take 20 mg by mouth 2 (two) times daily.    gabapentin (NEURONTIN) 100 MG capsule Take 1 capsule (100 mg total) by mouth 3 (three) times daily.    ibuprofen (ADVIL,MOTRIN) 800 MG tablet Take 1 tablet (800 mg total) by mouth every 6 (six) hours as needed for Pain.    metoprolol tartrate (LOPRESSOR) 100 MG tablet Take 1 tablet (100 mg total) by mouth 2 (two) times daily. (Patient not taking: Reported on 1/8/2024)     Family History    None       Tobacco Use    Smoking status: Never    Smokeless tobacco: Never   Substance and Sexual Activity    Alcohol use: Not Currently     Comment: occ    Drug use: Never    Sexual activity: Yes     Partners: Male     Birth control/protection: None     Comment:      Review of Systems   Constitutional:  Negative for chills and fever.   HENT:  Negative for congestion and drooling.    Respiratory:  Negative for cough and shortness of breath.    Cardiovascular:  Negative for chest pain and leg swelling.   Gastrointestinal:  Negative for constipation, diarrhea, nausea and vomiting.   Genitourinary:  Negative for difficulty urinating and dysuria.   Musculoskeletal:  Negative for arthralgias and gait problem.     Objective:     Vital Signs (Most Recent):  Temp: 98.2 °F (36.8 °C) (07/29/25 0730)  Pulse: 110 (07/29/25 0730)  Resp: 18 (07/29/25 0730)  BP: (!) 178/95 (07/29/25 0730)  SpO2: 96 % (07/29/25 0730) Vital Signs (24h Range):  Temp:  [98.2 °F (36.8 °C)-99.6 °F (37.6 °C)] 98.2 °F (36.8 °C)  Pulse:  [] 110  Resp:  [16-20] 18  SpO2:  [96 %-98 %] 96 %  BP: (132-178)/() 178/95     Weight: 122.8 kg (270 lb 11.6 oz)  Body mass index is 43.7 kg/m².     Physical  Exam  Constitutional:       Appearance: Normal appearance.   HENT:      Head: Normocephalic and atraumatic.   Cardiovascular:      Rate and Rhythm: Normal rate and regular rhythm.   Pulmonary:      Effort: Pulmonary effort is normal. No respiratory distress.      Breath sounds: Normal breath sounds.   Abdominal:      General: Abdomen is flat. There is no distension.      Palpations: Abdomen is soft.   Musculoskeletal:      Right lower leg: No edema.      Left lower leg: No edema.   Skin:     General: Skin is warm and dry.   Neurological:      Mental Status: She is alert and oriented to person, place, and time. Mental status is at baseline.      Comments: CN II: Visual Fields full to confrontation  CN III, IV , VI PERRL   CN III: no palsy   Nystagmus: none  Diplopia: none  Ophthalmoparesis: none  CN V Facial sensation intact  CN VII facial expression full, symmetric  CN VIII normal  CN IX normal  CN X normal  CN XI normal  CN XII normal                 Significant Labs: UPT  Results for orders placed or performed in visit on 05/05/22   POCT Urine Pregnancy    Collection Time: 05/05/22 10:07 AM   Result Value Ref Range    POC Preg Test, Ur Negative Negative     Acceptable Yes      U/A  Positive for UTI     UDS  Results for orders placed or performed during the hospital encounter of 07/28/25   Drug screen panel, in-house    Collection Time: 07/28/25  8:44 PM   Result Value Ref Range    Benzodiazepine, Urine Negative Negative    Methadone, Urine Negative Negative    Cocaine, Urine Negative Negative    Opiates, Urine Negative Negative    Barbiturates, Urine Negative Negative    Amphetamines, Urine Negative Negative    THC Negative Negative    Phencyclidine, Urine Negative Negative    Urine Creatinine 188.7 15.0 - 325.0 mg/dL     CBC  Results for orders placed or performed during the hospital encounter of 07/28/25   CBC with Differential    Collection Time: 07/28/25  5:30 PM   Result Value Ref Range    WBC  11.47 3.90 - 12.70 K/uL    RBC 4.88 4.00 - 5.40 M/uL    HGB 13.5 12.0 - 16.0 gm/dL    HCT 40.2 37.0 - 48.5 %    MCV 82 82 - 98 fL    MCH 27.7 27.0 - 31.0 pg    MCHC 33.6 32.0 - 36.0 g/dL    RDW 13.5 11.5 - 14.5 %    Platelet Count 310 150 - 450 K/uL    MPV 10.0 9.2 - 12.9 fL    Nucleated RBC 0 <=0 /100 WBC    Neut % 79.9 (H) 38 - 73 %    Lymph % 12.2 (L) 18 - 48 %    Mono % 5.9 4 - 15 %    Eos % 1.2 <=8 %    Basophil % 0.5 <=1.9 %    Imm Grans % 0.3 0.0 - 0.5 %    Neut # 9.16 (H) 1.8 - 7.7 K/uL    Lymph # 1.40 1 - 4.8 K/uL    Mono # 0.68 0.3 - 1 K/uL    Eos # 0.14 <=0.5 K/uL    Baso # 0.06 <=0.2 K/uL    Imm Grans # 0.03 0.00 - 0.04 K/uL   Results for orders placed or performed during the hospital encounter of 03/28/22   CBC auto differential    Collection Time: 03/29/22  1:46 AM   Result Value Ref Range    WBC 10.29 3.90 - 12.70 K/uL    RBC 5.10 4.00 - 5.40 M/uL    Hemoglobin 14.1 12.0 - 16.0 g/dL    Hematocrit 43.7 37.0 - 48.5 %    MCV 86 82 - 98 fL    MCH 27.6 27.0 - 31.0 pg    MCHC 32.3 32.0 - 36.0 g/dL    RDW 13.4 11.5 - 14.5 %    Platelets 327 150 - 450 K/uL    MPV 10.4 9.2 - 12.9 fL    Immature Granulocytes 0.2 0.0 - 0.5 %    Gran # (ANC) 4.6 1.8 - 7.7 K/uL    Immature Grans (Abs) 0.02 0.00 - 0.04 K/uL    Lymph # 4.3 1.0 - 4.8 K/uL    Mono # 0.7 0.3 - 1.0 K/uL    Eos # 0.7 (H) 0.0 - 0.5 K/uL    Baso # 0.07 0.00 - 0.20 K/uL    nRBC 0 0 /100 WBC    Gran % 44.7 38.0 - 73.0 %    Lymph % 41.3 18.0 - 48.0 %    Mono % 6.7 4.0 - 15.0 %    Eosinophil % 6.4 0.0 - 8.0 %    Basophil % 0.7 0.0 - 1.9 %    Differential Method Automated      CMP  Results for orders placed or performed during the hospital encounter of 07/28/25   Comprehensive Metabolic Panel    Collection Time: 07/28/25  5:30 PM   Result Value Ref Range    Sodium 140 136 - 145 mmol/L    Potassium 3.3 (L) 3.5 - 5.1 mmol/L    Chloride 107 95 - 110 mmol/L    CO2 20 (L) 23 - 29 mmol/L    Glucose 144 (H) 70 - 110 mg/dL    BUN 10 6 - 20 mg/dL    Creatinine 0.9 0.5 -  1.4 mg/dL    Calcium 9.2 8.7 - 10.5 mg/dL    Protein Total 8.4 6.0 - 8.4 gm/dL    Albumin 4.1 3.5 - 5.2 g/dL    Bilirubin Total 0.5 0.1 - 1.0 mg/dL    ALP 80 40 - 150 unit/L    AST 16 11 - 45 unit/L    ALT 15 10 - 44 unit/L    Anion Gap 13 8 - 16 mmol/L    eGFR >60 >60 mL/min/1.73/m2     TSH  Results for orders placed or performed during the hospital encounter of 07/28/25   TSH    Collection Time: 07/28/25  5:30 PM   Result Value Ref Range    TSH 2.502 0.400 - 4.000 uIU/mL     ETOH  Results for orders placed or performed during the hospital encounter of 07/28/25   Ethanol    Collection Time: 07/28/25  5:30 PM   Result Value Ref Range    Alcohol, Serum <10 <10 mg/dL     Salicylate  Results for orders placed or performed during the hospital encounter of 07/28/25   Salicylate Level    Collection Time: 07/28/25  5:30 PM   Result Value Ref Range    Salicylate Level <5.0 (L) 15.0 - 30.0 mg/dL     Acetaminophen  Results for orders placed or performed during the hospital encounter of 07/28/25   Acetaminophen Level    Collection Time: 07/28/25  5:30 PM   Result Value Ref Range    Acetaminophen Level <3.0 (L) 10.0 - 20.0 ug/ml             Significant Imaging: none  Assessment/Plan:     * Adjustment disorder with mixed anxiety and depressed mood  Defer to psych       UTI (urinary tract infection)  2+ and elevated WBC on microscopic 20  Urine culture pending  Keflex started         VTE Risk Mitigation (From admission, onward)      None                Thank you for your consult. I will sign off. Please contact us if you have any additional questions.    Sharon Flood PA-C  Department of Hospital Medicine   [facility]

## 2025-07-29 NOTE — PLAN OF CARE
"Behavioral Health Unit  Psychosocial History and Assessment  Progress Note      Patient Name: Joseph Pepper YOB: 1995 SW: NEETU LUNDMONICA Date: 7/29/2025    Chief Complaint: depression and suicidal ideation    Consent:     Did the patient consent for an interview with the ? Yes    Did the patient consent for the  to contact family/friend/caregiver?   Yes  Name: Jodie Rae, Relationship: aunt, and Contact: [phone]    Did the patient give consent for the  to inform family/friend/caregiver of his/her whereabouts or to discuss discharge planning? Yes    Source of Information: Face to face with patient and Telephone interview with family/friend/caregiver    Is information obtained from interviews considered reliable?   yes    Reason for Admission:     Active Hospital Problems    Diagnosis  POA    *Adjustment disorder with mixed anxiety and depressed mood [F43.23]  Yes    UTI (urinary tract infection) [N39.0]  Yes      Resolved Hospital Problems   No resolved problems to display.       History of Present Illness - (Patient Perception):   She reports that she had an argument with her . She reports that she threatened to hurt herself "in order to get him to listen." She denied any intention behind the threat.     History of Present Illness - (Perception of Others):   Per Dr. Yoni Ivey:  7/29/2025 10:07 AM   Joseph Pepper   1995   [id number]                                          DATE OF ADMISSION: 7/29/2025  1:01 AM     SITE: [facility]     CURRENT LEGAL STATUS: PEC and/or CEC        HISTORY    CHIEF COMPLAINT   Joseph Pepper is a 29 y.o. female with no past psychiatric history  currently admitted to the inpatient unit with the following chief complaint: depression/SI, "I was upset and said I wanted to hurt myself, but I didn't actually want to."    HPI   The patient was seen and examined. The chart was " "reviewed.     The patient presented to the ER on 7/29/2025 . Per staff notes:  -Patient to ED per [facility] with reports of SI. Patient reports that she made the comments out of anger, but she didn't really mean it. Denies HI   -Joseph Pepper is a 29 y.o. female that presents with suicidal ideation  Patient voiced suicidal ideation over the phone to her  earlier today  Patient is having issues at home particularly with her autistic child on history  Patient reports no history of suicidal ideation, denies any illegal drug use  Is under lot of stress, crying on EMS arrival, significant family issues daily  -Patient reports, she got into an argument with her  tonight and said some things she did not mean. She said her  recorded the conversation and let the police listen to the recording. Patient denies being suicidal and she says I have never been suicidal. Patient also reports, she misses her child. Patient denies HI no self harming behavior displayed. Patient contracted safety with staff and unit.       The patient was medically cleared and admitted to the BHU.     She reports that she had an argument with her . She reports that she threatened to hurt herself "in order to get him to listen." She dneid any intention behind the threat.     She is currently denying all symptoms, but she did report having significant life stressors which silicide parental (daughter with autism) and marital stressors.     She was focused on discharge- she is worried about being away form her child for too long        Symptoms of Depression: diminished mood - No, loss of interest/anhedonia - No;  recurrent - No, >14 days - No, diminished energy - No, change in sleep - No, change in appetite - No, diminished concentration or cognition or indecisiveness - No, PMA/R -  No, excessive guilt or hopelessness or worthlessness - No, suicidal ideations - No     Changes in Sleep: trouble with initiation- No, maintenance, " "- No early morning awakening with inability to return to sleep - No, hypersomnolence - No     Suicidal- active/passive ideations - No, organized plans, future intentions - No     Homicidal ideations: active/passive ideations - No, organized plans, future intentions - No     Symptoms of psychosis: hallucinations - No, delusions - No, disorganized speech - No, disorganized behavior or abnormal motor behavior - No, or negative symptoms (diminshed emotional expression, avolition, anhedonia, alogia, asociality) - No, active phase symptoms >1 month - No, continuous signs of illness > 6 months - No, since onset of illness decreased level of functioning present - No     Symptoms of janina or hypomania: elevated, expansive, or irritable mood with increased energy or activity - No; > 4 days - No,  >7 days - No; with inflated self-esteem or grandiosity - No, decreased need for sleep - No, increased rate of speech - No, FOI or racing thoughts - No, distractibility - No, increased goal directed activity or PMA - No, risky/disinhibited behavior - No     Symptoms of LINETTE: excessive anxiety/worry/fear, more days than not, about numerous issues - No, ongoing for >6 months - No, difficult to control - No, with restlessness - No, fatigue - No, poor concentration - No, irritability - No, muscle tension - No, sleep disturbance - No; causes functionally impairing distress - No     Symptoms of Panic Disorder: recurrent panic attacks (palpitations/heart racing, sweating, shakiness, dyspnea, choking, chest pain/discomfort, Gi symptoms, dizzy/lightheadedness, hot/col flashes, paresthesias, derealization, fear of losing control or fear of dying or fear of "going crazy") - No, precipitated - No, un-precipitated - No, source of worry and/or behavioral changes secondary for 1 month or longer- No, agoraphobia - No     Symptoms of PTSD: h/o trauma exposure - No; re-experiencing/intrusive symptoms - No, avoidant behavior - No, 2 or more negative " alterations in cognition or mood - No, 2 or more hyperarousal symptoms - No; with dissociative symptoms - No, ongoing for 1 or more  months - No     Symptoms of OCD: obsessions (recurrent thoughts/urges/images; intrusive and/or unwanted; uses other thoughts/actions to suppress) - No; compulsions (repetitive behaviors used to lower distress/anxiety/obsessions) - No, time-consuming (over 1 hour per day) or cause significant distress/impairment - - No     Symptoms of Anorexia: restriction of caloric intake leading to significantly low body weight - No, intense fear of gaining weight or persistent behavior that interferes with weight gain even thought at a significantly low weight - No, disturbance in the way in which one's body weight or shape is experienced, undue influence of body weight or shape on self evaluation, or persistent lack of recognition of the seriousness of the current low body weight - No     Symptoms of Bulimia: recurrent episodes of binge eating (definitely larger amount  than what others would eat and lack of a sense of control over eating during episode) - No, recurrent inappropriate compensatory behaviors in order to prevent weight gain (fasting, medications, exercise, vomiting) - No, binges and compensatory behaviors both occur on average at least once a week for 3 months - No, self evaluations is unduly influenced by body shape/weight- - No     Symptoms of Binge eating: recurrent episodes of binge eating (definitely larger amount than what others would eat and lack of a sense of control over eating during episode) - No, 3 or more of following (eating much more rapidly, eating until uncomfortably full, large amounts when not hungry, eating alone because of embarrassed by how much,  feeling disgusted with oneself, depressed or very guilty afterward) - No, distress regarding binges - No, binges occur on average at least once a week for 3 months - No        Substance/s:  Taken in larger amounts or  over longer periods than intended: No,  Persistent desire or unsuccessful attempts to cut down or stop: No,  Great deal of time spent seeking, using or recovering from: No,  Craving or strong desire to use: No,  Recurrent use despite failure to meet major role obligation: No,  Continued use despite persistent or recurrent social/interparsonal issues due to use: No,  Important social/work/recreational activities given up due to use: No,  Recurrent use in physically hazardous situations: No,  Continued use despite knowledge of persistent physical or psychological problem: No,  Tolerance (either increased need or diminished effect): No,        [facility] Toolkit ASQ Suicide Screening Tool:  In the past few weeks, have you wished you were dead? No  In the past few weeks, have you felt that you or your family would be better off if you were dead? No  In the past week, have you been having thoughts about killing yourself? No  Have you ever tried to kill yourself? No  Are you having thoughts of killing yourself right now? No        Psychotherapy:  Target symptoms: depression, anxiety   Why chosen therapy is appropriate versus another modality: relevant to diagnosis, patient responds to this modality, evidence based practice  Outcome monitoring methods: self-report, observation  Therapeutic intervention type: insight oriented psychotherapy, behavior modifying psychotherapy, supportive psychotherapy, interactive psychotherapy  Topics discussed/themes: difficulty managing affect in interpersonal relationships, building skills sets for symptom management, symptom recognition  The patient's response to the intervention is accepting. The patient's progress toward treatment goals is fair.   Duration of intervention: 16 minutes.        PAST PSYCHIATRIC HISTORY  Previous Psychiatric Hospitalizations: No  Previous SI/HI: No,  Previous Suicide Attempts: No,   Previous Medication Trials: Yes,for post partum depression  Psychiatric Care  (current & past): Yes OBGYN in past,  History of Psychotherapy: No,  History of Violence: No,  History of sexual/physical abuse: No,     PAST MEDICAL & SURGICAL HISTORY        Past Medical History:   Diagnosis Date    History of atrial fibrillation              Past Surgical History:   Procedure Laterality Date     SECTION N/A 2021     Procedure: PRIMARY  SECTION;  Surgeon: Mehran Fish MD;  Location: [facility] OR;  Service: OB/GYN;  Laterality: N/A;    COLONOSCOPY        ESOPHAGOSCOPY        INJECTION, SACROILIAC JOINT Left 2023     Procedure: INJECTION,SACROILIAC JOINT;  Surgeon: Dez Gonzales MD;  Location: [facility] OR;  Service: Pain Management;  Laterality: Left;    INJECTION, SACROILIAC JOINT Right 2024     Procedure: INJECTION,SACROILIAC JOINT;  Surgeon: Dez Gonzales MD;  Location: [facility] OR;  Service: Pain Management;  Laterality: Right;    TONSILLECTOMY, ADENOIDECTOMY, BILATERAL MYRINGOTOMY AND TUBES        WISDOM TOOTH EXTRACTION                CURRENT PSYCH MEDICATION REGIMEN   denied  Current Medication side effects:  n/a  Current Medication compliance:  n/a     Previous psych meds trials  Yes- pt unable to name     Home Meds:           Prior to Admission medications    Medication Sig Start Date End Date Taking? Authorizing Provider   famotidine (PEPCID) 20 MG tablet Take 20 mg by mouth 2 (two) times daily.       Provider, Historical   gabapentin (NEURONTIN) 100 MG capsule Take 1 capsule (100 mg total) by mouth 3 (three) times daily. 24     Dez Gonzales MD   ibuprofen (ADVIL,MOTRIN) 800 MG tablet Take 1 tablet (800 mg total) by mouth every 6 (six) hours as needed for Pain. 24     Dez Gonzales MD   metoprolol tartrate (LOPRESSOR) 100 MG tablet Take 1 tablet (100 mg total) by mouth 2 (two) times daily.  Patient not taking: Reported on 2024 3/30/22 11/22/23   Reno Forte MD            OTC Meds: none     Scheduled Meds:    PRN Meds:   Current  "Facility-Administered Medications:     acetaminophen, 650 mg, Oral, Q6H PRN    calcium carbonate, 1,000 mg, Oral, Q8H PRN    haloperidoL, 5 mg, Oral, Q4H PRN **AND** diphenhydrAMINE, 50 mg, Oral, Q4H PRN **AND** LORazepam, 2 mg, Oral, Q4H PRN **AND** haloperidol lactate, 5 mg, Intramuscular, Q4H PRN **AND** diphenhydrAMINE, 50 mg, Intramuscular, Q4H PRN **AND** lorazepam, 2 mg, Intramuscular, Q4H PRN    guaiFENesin 100 mg/5 ml, 200 mg, Oral, Q4H PRN    hydrOXYzine pamoate, 50 mg, Oral, Q6H PRN    melatonin, 6 mg, Oral, Nightly PRN    nicotine, 1 patch, Transdermal, Daily PRN    ondansetron, 4 mg, Oral, Q8H PRN   Psychotherapeutics (From admission, onward)        Start     Stop Route Frequency Ordered     07/29/25 0102   haloperidoL tablet 5 mg  (Med - Acute  Behavioral Management)        Placed in "And" Linked Group    -- Oral Every 4 hours PRN 07/29/25 0102     07/29/25 0102   LORazepam tablet 2 mg  (Med - Acute  Behavioral Management)        Placed in "And" Linked Group    -- Oral Every 4 hours PRN 07/29/25 0102     07/29/25 0102   haloperidol lactate injection 5 mg  (Med - Acute  Behavioral Management)        Placed in "And" Linked Group    -- IM Every 4 hours PRN 07/29/25 0102     07/29/25 0102   LORazepam injection 2 mg  (Med - Acute  Behavioral Management)        Placed in "And" Linked Group    -- IM Every 4 hours PRN 07/29/25 0102                ALLERGIES         Review of patient's allergies indicates:   Allergen Reactions    Shrimp Edema    Allergenic extract-food-shrimp Edema    Pollen extracts         General allergic rhinitis          NEUROLOGIC HISTORY  Seizures: No  Head trauma: No     SOCIAL HISTORY:  Developmental/Childhood:Achieved all developmental milestones timely  Education:High School Diploma  Employment Status/Finances:Homemaker   Relationship Status/Sexual Orientation:   Children: 1  Housing Status: Home    history:  NO   Access to Firearms: NO ;  Locked up? " n/a  Roman Catholic:Non-spiritual  Recreational activities:Art; time with family     SUBSTANCE ABUSE HISTORY   Recreational Drugs: denied   Use of Alcohol: occasional, social use  Rehab History:no   Tobacco Use:no     LEGAL HISTORY:   Past charges/incarcerations: NO  Pending charges:NO     FAMILY PSYCHIATRIC HISTORY          Family History   Problem Relation Name Age of Onset    Breast cancer Neg Hx        Colon cancer Neg Hx        Ovarian cancer Neg Hx             denied        ROS  General ROS: negative  Ophthalmic ROS: negative  ENT ROS: negative  Allergy and Immunology ROS: negative  Hematological and Lymphatic ROS: negative  Endocrine ROS: negative  Respiratory ROS: no cough, shortness of breath, or wheezing  Cardiovascular ROS: no chest pain or dyspnea on exertion  Gastrointestinal ROS: no abdominal pain, change in bowel habits, or black or bloody stools  Genito-Urinary ROS: no dysuria, trouble voiding, or hematuria  Musculoskeletal ROS: negative  Neurological ROS: no TIA or stroke symptoms  Dermatological ROS: negative           EXAMINATION     PHYSICAL EXAM  Reviewed note/exam by Dr. Terrell from 7/28/25 at 5:20 PM; MEd consulted for physical exam- pending     VITALS       Vitals:     07/29/25 0730   BP: (!) 178/95   Pulse: 110   Resp: 18   Temp: 98.2 °F (36.8 °C)         Body mass index is 43.7 kg/m².           PAIN  0/10  Subjective report of pain matches objective signs and symptoms: Yes     LABORATORY DATA   Recent Results         Recent Results (from the past 72 hours)   Hemoglobin A1c     Collection Time: 07/28/25  5:09 PM   Result Value Ref Range     Hemoglobin A1c 5.8 (H) 4.0 - 5.6 %     Estimated Average Glucose 120 68 - 131 mg/dL   Comprehensive Metabolic Panel     Collection Time: 07/28/25  5:30 PM   Result Value Ref Range     Sodium 140 136 - 145 mmol/L     Potassium 3.3 (L) 3.5 - 5.1 mmol/L     Chloride 107 95 - 110 mmol/L     CO2 20 (L) 23 - 29 mmol/L     Glucose 144 (H) 70 - 110 mg/dL     BUN 10 6  - 20 mg/dL     Creatinine 0.9 0.5 - 1.4 mg/dL     Calcium 9.2 8.7 - 10.5 mg/dL     Protein Total 8.4 6.0 - 8.4 gm/dL     Albumin 4.1 3.5 - 5.2 g/dL     Bilirubin Total 0.5 0.1 - 1.0 mg/dL     ALP 80 40 - 150 unit/L     AST 16 11 - 45 unit/L     ALT 15 10 - 44 unit/L     Anion Gap 13 8 - 16 mmol/L     eGFR >60 >60 mL/min/1.73/m2   Salicylate Level     Collection Time: 07/28/25  5:30 PM   Result Value Ref Range     Salicylate Level <5.0 (L) 15.0 - 30.0 mg/dL   Acetaminophen Level     Collection Time: 07/28/25  5:30 PM   Result Value Ref Range     Acetaminophen Level <3.0 (L) 10.0 - 20.0 ug/ml   TSH     Collection Time: 07/28/25  5:30 PM   Result Value Ref Range     TSH 2.502 0.400 - 4.000 uIU/mL   Ethanol     Collection Time: 07/28/25  5:30 PM   Result Value Ref Range     Alcohol, Serum <10 <10 mg/dL   CBC with Differential     Collection Time: 07/28/25  5:30 PM   Result Value Ref Range     WBC 11.47 3.90 - 12.70 K/uL     RBC 4.88 4.00 - 5.40 M/uL     HGB 13.5 12.0 - 16.0 gm/dL     HCT 40.2 37.0 - 48.5 %     MCV 82 82 - 98 fL     MCH 27.7 27.0 - 31.0 pg     MCHC 33.6 32.0 - 36.0 g/dL     RDW 13.5 11.5 - 14.5 %     Platelet Count 310 150 - 450 K/uL     MPV 10.0 9.2 - 12.9 fL     Nucleated RBC 0 <=0 /100 WBC     Neut % 79.9 (H) 38 - 73 %     Lymph % 12.2 (L) 18 - 48 %     Mono % 5.9 4 - 15 %     Eos % 1.2 <=8 %     Basophil % 0.5 <=1.9 %     Imm Grans % 0.3 0.0 - 0.5 %     Neut # 9.16 (H) 1.8 - 7.7 K/uL     Lymph # 1.40 1 - 4.8 K/uL     Mono # 0.68 0.3 - 1 K/uL     Eos # 0.14 <=0.5 K/uL     Baso # 0.06 <=0.2 K/uL     Imm Grans # 0.03 0.00 - 0.04 K/uL   Troponin I High Sensitivity     Collection Time: 07/28/25  5:30 PM   Result Value Ref Range     Troponin High Sensitive 3 <=14 ng/L   Urinalysis, Reflex to Urine Culture Urine, Clean Catch     Collection Time: 07/28/25  8:44 PM     Specimen: Urine   Result Value Ref Range     Color, UA Yellow Straw, Essence, Yellow, Light-Orange     Appearance, UA Cloudy (A) Clear     pH,  "UA 6.0 5.0 - 8.0     Spec Grav UA 1.025 1.005 - 1.030     Protein, UA 2+ (A) Negative     Glucose, UA Negative Negative     Ketones, UA Negative Negative     Bilirubin, UA Negative Negative     Blood, UA 2+ (A) Negative     Nitrites, UA Negative Negative     Urobilinogen, UA Negative <2.0 EU/dL     Leukocyte Esterase, UA 2+ (A) Negative   Drug screen panel, in-house     Collection Time: 07/28/25  8:44 PM   Result Value Ref Range     Benzodiazepine, Urine Negative Negative     Methadone, Urine Negative Negative     Cocaine, Urine Negative Negative     Opiates, Urine Negative Negative     Barbiturates, Urine Negative Negative     Amphetamines, Urine Negative Negative     THC Negative Negative     Phencyclidine, Urine Negative Negative     Urine Creatinine 188.7 15.0 - 325.0 mg/dL   Pregnancy, urine rapid     Collection Time: 07/28/25  8:44 PM   Result Value Ref Range     hCG Qualitative, Urine Negative Negative   Urinalysis Microscopic     Collection Time: 07/28/25  8:44 PM   Result Value Ref Range     RBC, UA 4 0 - 4 /HPF     WBC, UA 20 (H) 0 - 5 /HPF     Bacteria, UA Many (A) None, Rare, Occasional /HPF     Squamous Epithelial Cells, UA 25 <=5 /HPF     Hyaline Casts, UA 0 0 - 1 /LPF     Microscopic Comment       Troponin I High Sensitivity     Collection Time: 07/28/25 10:07 PM   Result Value Ref Range     Troponin High Sensitive 5 <=14 ng/L   EKG 12-lead     Collection Time: 07/29/25 12:02 AM   Result Value Ref Range     QRS Duration 84 ms     OHS QTC Calculation 501 ms         No results found for: "PHENYTOIN", "PHENOBARB", "VALPROATE", "CBMZ"           CONSTITUTIONAL  General Appearance: unremarkable, age appropriate, obese     MUSCULOSKELETAL  Muscle Strength and Tone:no dyskinesia, no dystonia, no tremor, no tic  Abnormal Involuntary Movements: No  Gait and Station: non-ataxic     PSYCHIATRIC   Level of Consciousness: awake and alert   Orientation: person, place, time, and situation  Grooming: Hospital garb and " Well groomed  Psychomotor Behavior: normal, cooperative, friendly and cooperative, eye contact normal, no PMA/R  Speech: normal tone, normal rate, normal pitch, normal volume, spontaneous  Language: grossly intact, able to name, able to repeat  Mood: anxious  Affect: Anxious, Consistent with mood, Congruent with thought, and Full  Thought Process: linear, logical  Associations: intact   Thought Content: denies SI, denies HI, and no delusions  Perceptions: denies AH and denies  VH  Memory: Able to recall past events, Remote intact, and Recent intact  Attention:Normal and Attends to interview without distraction  Fund of Knowledge: Aware of current events and Vocabulary appropriate   Estimate if Intelligence:  Average based on work/education history, vocabulary and mental status exam  Insight: intact, has awareness of illness- fair  Judgment: behavior is adequate to circumstances, age appropriate- fair/improving        PSYCHOSOCIAL     PSYCHOSOCIAL STRESSORS   family and marital     FUNCTIONING RELATIONSHIPS   good support system and strained with spouse or significant others     STRENGTHS AND LIABILITIES   Strength: Patient accepts guidance/feedback, Strength: Patient is expressive/articulate., Liability: Patient is unstable., Liability: Patient lacks coping skills.     Is the patient aware of the biomedical complications associated with substance abuse and mental illness? yes     Does the patient have an Advance Directive for Mental Health treatment? no  (If yes, inform patient to bring copy.)           MEDICAL DECISION MAKING          ASSESSMENT      Suicidal ideations  Adjustment disorder with mixed depressed and anxious features     Psychosocial stressors     Obesity  Tachycardia  Elevated Blood pressure  Elevated blood glucose        PROBLEM LIST AND MANAGEMENT PLANS     Suicidal ideations: pt counseled  -continue inpatient tx for safety and stabilization  -seeking collateral- pending result, patient may be a  candidate for discharge tomorrow.     Adjustment disorder with mixed depressed and anxious features: pt counseled  -pt declined pharmacotherapy  -psychotherapy provided     Psychosocial stressors: pt counseled  -SW consulted for resources      Obesity: pt counseled      Tachycardia: pt counseled  -med consulted      Elevated Blood pressure: pt counseled  -med consulted      Elevated blood glucose: pt counseled  -med consulted   Check HgA1c- 5.8  +prediabetes        PRESCRIPTION DRUG MANAGEMENT  Compliance: yes  Side Effects: no  Regimen Adjustments: see above     Discussed diagnosis, risks and benefits of proposed treatment vs alternative treatments vs no treatment, potential side effects of these treatments and the inherent unpredictability of treatment. The patient expresses understanding of the above and displays the capacity to agree with this treatment given said understanding. Patient also agrees that, currently, the benefits outweigh the risks and would like to pursue/continue treatment at this time.     Any medications being used off-label were discussed with the patient inclusive of the evidence base for the use of the medications and consent was obtained for the off-label use of the medication.      DIAGNOSTIC TESTING  Labs reviewed with patient; follow up pending labs     Disposition:  -Will attempt to obtain outside psychiatric records if available  -SW to assist with aftercare planning and collateral  -Once stable discharge home with outpatient follow up care and/or rehab  -Continue inpatient treatment under a PEC and/or CEC for danger to self/ danger to others/grave disability as evident by suicidal ideation      Present biopsychosocial functioning:   Pt is a 29 year old female with chief complaints of depression/SI. Patient reports, she got into an argument with her  tonight and said some things she did not mean. She said her  recorded the conversation and let the police listen to the  recording. Patient denies being suicidal and she says I have never been suicidal. Pt states she does have an autistic child and needs to get back to her child.     Past biopsychosocial functioning:   Pt has no past psychiatric history. Aunt states pt was doing fine for years before doing all of this.    Family and Marital/Relationship History:     Significant Other/Partner Relationships:  : Frequent arguments and Relationship strained    Family Relationships: Strained      Childhood History:     [place]    Who raised the patient?  Biological mother      How does patient describe their childhood?   Pt states good      Who is patient's primary support person?  Jodie girma/aunt      Culture and Sikhism:     Sikhism: Unknown    How strong of a role does [facility] and spirituality play in patient's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

## 2025-07-29 NOTE — SUBJECTIVE & OBJECTIVE
Past Medical History:   Diagnosis Date    History of atrial fibrillation        Past Surgical History:   Procedure Laterality Date     SECTION N/A 2021    Procedure: PRIMARY  SECTION;  Surgeon: Mehran Fish MD;  Location: [facility] OR;  Service: OB/GYN;  Laterality: N/A;    COLONOSCOPY      ESOPHAGOSCOPY      INJECTION, SACROILIAC JOINT Left 2023    Procedure: INJECTION,SACROILIAC JOINT;  Surgeon: Dez Gonzales MD;  Location: [facility] OR;  Service: Pain Management;  Laterality: Left;    INJECTION, SACROILIAC JOINT Right 2024    Procedure: INJECTION,SACROILIAC JOINT;  Surgeon: Dez Gonzales MD;  Location: [facility] OR;  Service: Pain Management;  Laterality: Right;    TONSILLECTOMY, ADENOIDECTOMY, BILATERAL MYRINGOTOMY AND TUBES      WISDOM TOOTH EXTRACTION         Review of patient's allergies indicates:   Allergen Reactions    Shrimp Edema    Allergenic extract-food-shrimp Edema    Pollen extracts      General allergic rhinitis        Current Facility-Administered Medications on File Prior to Encounter   Medication    [COMPLETED] acetaminophen tablet 1,000 mg    [COMPLETED] cefTRIAXone injection 1 g    [COMPLETED] ibuprofen tablet 800 mg    [DISCONTINUED] diphenhydrAMINE injection 50 mg    [DISCONTINUED] haloperidol lactate injection 5 mg    [DISCONTINUED] LORazepam injection 2 mg     Current Outpatient Medications on File Prior to Encounter   Medication Sig    famotidine (PEPCID) 20 MG tablet Take 20 mg by mouth 2 (two) times daily.    gabapentin (NEURONTIN) 100 MG capsule Take 1 capsule (100 mg total) by mouth 3 (three) times daily.    ibuprofen (ADVIL,MOTRIN) 800 MG tablet Take 1 tablet (800 mg total) by mouth every 6 (six) hours as needed for Pain.    metoprolol tartrate (LOPRESSOR) 100 MG tablet Take 1 tablet (100 mg total) by mouth 2 (two) times daily. (Patient not taking: Reported on 2024)     Family History    None       Tobacco Use    Smoking status: Never    Smokeless  tobacco: Never   Substance and Sexual Activity    Alcohol use: Not Currently     Comment: occ    Drug use: Never    Sexual activity: Yes     Partners: Male     Birth control/protection: None     Comment:      Review of Systems   Constitutional:  Negative for chills and fever.   HENT:  Negative for congestion and drooling.    Respiratory:  Negative for cough and shortness of breath.    Cardiovascular:  Negative for chest pain and leg swelling.   Gastrointestinal:  Negative for constipation, diarrhea, nausea and vomiting.   Genitourinary:  Negative for difficulty urinating and dysuria.   Musculoskeletal:  Negative for arthralgias and gait problem.     Objective:     Vital Signs (Most Recent):  Temp: 98.2 °F (36.8 °C) (07/29/25 0730)  Pulse: 110 (07/29/25 0730)  Resp: 18 (07/29/25 0730)  BP: (!) 178/95 (07/29/25 0730)  SpO2: 96 % (07/29/25 0730) Vital Signs (24h Range):  Temp:  [98.2 °F (36.8 °C)-99.6 °F (37.6 °C)] 98.2 °F (36.8 °C)  Pulse:  [] 110  Resp:  [16-20] 18  SpO2:  [96 %-98 %] 96 %  BP: (132-178)/() 178/95     Weight: 122.8 kg (270 lb 11.6 oz)  Body mass index is 43.7 kg/m².     Physical Exam  Constitutional:       Appearance: Normal appearance.   HENT:      Head: Normocephalic and atraumatic.   Cardiovascular:      Rate and Rhythm: Normal rate and regular rhythm.   Pulmonary:      Effort: Pulmonary effort is normal. No respiratory distress.      Breath sounds: Normal breath sounds.   Abdominal:      General: Abdomen is flat. There is no distension.      Palpations: Abdomen is soft.   Musculoskeletal:      Right lower leg: No edema.      Left lower leg: No edema.   Skin:     General: Skin is warm and dry.   Neurological:      Mental Status: She is alert and oriented to person, place, and time. Mental status is at baseline.      Comments: CN II: Visual Fields full to confrontation  CN III, IV , VI PERRL   CN III: no palsy   Nystagmus: none  Diplopia: none  Ophthalmoparesis: none  CN V Facial  sensation intact  CN VII facial expression full, symmetric  CN VIII normal  CN IX normal  CN X normal  CN XI normal  CN XII normal                 Significant Labs: UPT  Results for orders placed or performed in visit on 05/05/22   POCT Urine Pregnancy    Collection Time: 05/05/22 10:07 AM   Result Value Ref Range    POC Preg Test, Ur Negative Negative     Acceptable Yes      U/A  Positive for UTI     UDS  Results for orders placed or performed during the hospital encounter of 07/28/25   Drug screen panel, in-house    Collection Time: 07/28/25  8:44 PM   Result Value Ref Range    Benzodiazepine, Urine Negative Negative    Methadone, Urine Negative Negative    Cocaine, Urine Negative Negative    Opiates, Urine Negative Negative    Barbiturates, Urine Negative Negative    Amphetamines, Urine Negative Negative    THC Negative Negative    Phencyclidine, Urine Negative Negative    Urine Creatinine 188.7 15.0 - 325.0 mg/dL     CBC  Results for orders placed or performed during the hospital encounter of 07/28/25   CBC with Differential    Collection Time: 07/28/25  5:30 PM   Result Value Ref Range    WBC 11.47 3.90 - 12.70 K/uL    RBC 4.88 4.00 - 5.40 M/uL    HGB 13.5 12.0 - 16.0 gm/dL    HCT 40.2 37.0 - 48.5 %    MCV 82 82 - 98 fL    MCH 27.7 27.0 - 31.0 pg    MCHC 33.6 32.0 - 36.0 g/dL    RDW 13.5 11.5 - 14.5 %    Platelet Count 310 150 - 450 K/uL    MPV 10.0 9.2 - 12.9 fL    Nucleated RBC 0 <=0 /100 WBC    Neut % 79.9 (H) 38 - 73 %    Lymph % 12.2 (L) 18 - 48 %    Mono % 5.9 4 - 15 %    Eos % 1.2 <=8 %    Basophil % 0.5 <=1.9 %    Imm Grans % 0.3 0.0 - 0.5 %    Neut # 9.16 (H) 1.8 - 7.7 K/uL    Lymph # 1.40 1 - 4.8 K/uL    Mono # 0.68 0.3 - 1 K/uL    Eos # 0.14 <=0.5 K/uL    Baso # 0.06 <=0.2 K/uL    Imm Grans # 0.03 0.00 - 0.04 K/uL   Results for orders placed or performed during the hospital encounter of 03/28/22   CBC auto differential    Collection Time: 03/29/22  1:46 AM   Result Value Ref Range     WBC 10.29 3.90 - 12.70 K/uL    RBC 5.10 4.00 - 5.40 M/uL    Hemoglobin 14.1 12.0 - 16.0 g/dL    Hematocrit 43.7 37.0 - 48.5 %    MCV 86 82 - 98 fL    MCH 27.6 27.0 - 31.0 pg    MCHC 32.3 32.0 - 36.0 g/dL    RDW 13.4 11.5 - 14.5 %    Platelets 327 150 - 450 K/uL    MPV 10.4 9.2 - 12.9 fL    Immature Granulocytes 0.2 0.0 - 0.5 %    Gran # (ANC) 4.6 1.8 - 7.7 K/uL    Immature Grans (Abs) 0.02 0.00 - 0.04 K/uL    Lymph # 4.3 1.0 - 4.8 K/uL    Mono # 0.7 0.3 - 1.0 K/uL    Eos # 0.7 (H) 0.0 - 0.5 K/uL    Baso # 0.07 0.00 - 0.20 K/uL    nRBC 0 0 /100 WBC    Gran % 44.7 38.0 - 73.0 %    Lymph % 41.3 18.0 - 48.0 %    Mono % 6.7 4.0 - 15.0 %    Eosinophil % 6.4 0.0 - 8.0 %    Basophil % 0.7 0.0 - 1.9 %    Differential Method Automated      CMP  Results for orders placed or performed during the hospital encounter of 07/28/25   Comprehensive Metabolic Panel    Collection Time: 07/28/25  5:30 PM   Result Value Ref Range    Sodium 140 136 - 145 mmol/L    Potassium 3.3 (L) 3.5 - 5.1 mmol/L    Chloride 107 95 - 110 mmol/L    CO2 20 (L) 23 - 29 mmol/L    Glucose 144 (H) 70 - 110 mg/dL    BUN 10 6 - 20 mg/dL    Creatinine 0.9 0.5 - 1.4 mg/dL    Calcium 9.2 8.7 - 10.5 mg/dL    Protein Total 8.4 6.0 - 8.4 gm/dL    Albumin 4.1 3.5 - 5.2 g/dL    Bilirubin Total 0.5 0.1 - 1.0 mg/dL    ALP 80 40 - 150 unit/L    AST 16 11 - 45 unit/L    ALT 15 10 - 44 unit/L    Anion Gap 13 8 - 16 mmol/L    eGFR >60 >60 mL/min/1.73/m2     TSH  Results for orders placed or performed during the hospital encounter of 07/28/25   TSH    Collection Time: 07/28/25  5:30 PM   Result Value Ref Range    TSH 2.502 0.400 - 4.000 uIU/mL     ETOH  Results for orders placed or performed during the hospital encounter of 07/28/25   Ethanol    Collection Time: 07/28/25  5:30 PM   Result Value Ref Range    Alcohol, Serum <10 <10 mg/dL     Salicylate  Results for orders placed or performed during the hospital encounter of 07/28/25   Salicylate Level    Collection Time:  07/28/25  5:30 PM   Result Value Ref Range    Salicylate Level <5.0 (L) 15.0 - 30.0 mg/dL     Acetaminophen  Results for orders placed or performed during the hospital encounter of 07/28/25   Acetaminophen Level    Collection Time: 07/28/25  5:30 PM   Result Value Ref Range    Acetaminophen Level <3.0 (L) 10.0 - 20.0 ug/ml             Significant Imaging: none
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
## 2025-07-29 NOTE — PROGRESS NOTES
"   07/29/25 1000   [facility] Group Therapy   Group Name Therapeutic Recreation   Specific Interventions Cognitive Stimulation Training   Participation Level Appropriate   Participation Quality Cooperative   Insight/Motivation Improved;Applies New Skills   Affect/Mood Display Anxious   Cognition Alert   Psychomotor WNL     Patient isolating in room, resting in bed, presents tearful, sad, focused on discharge. Patient states "I want to go home. I miss my kids. I threaten to hurt myself because I was upset." Patient states she was upset due to communicating problems with her . After encouragement patient attended group and participated appropriately.  "
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
## 2025-07-29 NOTE — NURSING
Late admit. Pt sleeping at this time, slept 3 hrs with 1 awakenings. NAD. Resp even and unlabored.Pathways clear,bed in low position. Q 15 min safety check ongoing.All precautions maintained.

## 2025-07-29 NOTE — H&P
"PSYCHIATRY INPATIENT ADMISSION NOTE - H & P      7/29/2025 10:07 AM   Joseph Pepper   1995   [id number]         DATE OF ADMISSION: 7/29/2025  1:01 AM    SITE: [facility]    CURRENT LEGAL STATUS: PEC and/or CEC      HISTORY    CHIEF COMPLAINT   Joseph Pepper is a 29 y.o. female with no past psychiatric history  currently admitted to the inpatient unit with the following chief complaint: depression/SI, "I was upset and said I wanted to hurt myself, but I didn't actually want to."    HPI   The patient was seen and examined. The chart was reviewed.    The patient presented to the ER on 7/29/2025 . Per staff notes:  -Patient to ED per [facility] with reports of SI. Patient reports that she made the comments out of anger, but she didn't really mean it. Denies HI   -Joseph Pepper is a 29 y.o. female that presents with suicidal ideation  Patient voiced suicidal ideation over the phone to her  earlier today  Patient is having issues at home particularly with her autistic child on history  Patient reports no history of suicidal ideation, denies any illegal drug use  Is under lot of stress, crying on EMS arrival, significant family issues daily  -Patient reports, she got into an argument with her  tonight and said some things she did not mean. She said her  recorded the conversation and let the police listen to the recording. Patient denies being suicidal and she says I have never been suicidal. Patient also reports, she misses her child. Patient denies HI no self harming behavior displayed. Patient contracted safety with staff and unit.      The patient was medically cleared and admitted to the U.    She reports that she had an argument with her . She reports that she threatened to hurt herself "in order to get him to listen." She dneid any intention behind the threat.    She is currently denying all symptoms, but she did report having significant life stressors which " silicide parental (daughter with autism) and marital stressors.    She was focused on discharge- she is worried about being away form her child for too long      Symptoms of Depression: diminished mood - No, loss of interest/anhedonia - No;  recurrent - No, >14 days - No, diminished energy - No, change in sleep - No, change in appetite - No, diminished concentration or cognition or indecisiveness - No, PMA/R -  No, excessive guilt or hopelessness or worthlessness - No, suicidal ideations - No    Changes in Sleep: trouble with initiation- No, maintenance, - No early morning awakening with inability to return to sleep - No, hypersomnolence - No    Suicidal- active/passive ideations - No, organized plans, future intentions - No    Homicidal ideations: active/passive ideations - No, organized plans, future intentions - No    Symptoms of psychosis: hallucinations - No, delusions - No, disorganized speech - No, disorganized behavior or abnormal motor behavior - No, or negative symptoms (diminshed emotional expression, avolition, anhedonia, alogia, asociality) - No, active phase symptoms >1 month - No, continuous signs of illness > 6 months - No, since onset of illness decreased level of functioning present - No    Symptoms of janina or hypomania: elevated, expansive, or irritable mood with increased energy or activity - No; > 4 days - No,  >7 days - No; with inflated self-esteem or grandiosity - No, decreased need for sleep - No, increased rate of speech - No, FOI or racing thoughts - No, distractibility - No, increased goal directed activity or PMA - No, risky/disinhibited behavior - No    Symptoms of LINETTE: excessive anxiety/worry/fear, more days than not, about numerous issues - No, ongoing for >6 months - No, difficult to control - No, with restlessness - No, fatigue - No, poor concentration - No, irritability - No, muscle tension - No, sleep disturbance - No; causes functionally impairing distress - No    Symptoms of  "Panic Disorder: recurrent panic attacks (palpitations/heart racing, sweating, shakiness, dyspnea, choking, chest pain/discomfort, Gi symptoms, dizzy/lightheadedness, hot/col flashes, paresthesias, derealization, fear of losing control or fear of dying or fear of "going crazy") - No, precipitated - No, un-precipitated - No, source of worry and/or behavioral changes secondary for 1 month or longer- No, agoraphobia - No    Symptoms of PTSD: h/o trauma exposure - No; re-experiencing/intrusive symptoms - No, avoidant behavior - No, 2 or more negative alterations in cognition or mood - No, 2 or more hyperarousal symptoms - No; with dissociative symptoms - No, ongoing for 1 or more  months - No    Symptoms of OCD: obsessions (recurrent thoughts/urges/images; intrusive and/or unwanted; uses other thoughts/actions to suppress) - No; compulsions (repetitive behaviors used to lower distress/anxiety/obsessions) - No, time-consuming (over 1 hour per day) or cause significant distress/impairment - - No    Symptoms of Anorexia: restriction of caloric intake leading to significantly low body weight - No, intense fear of gaining weight or persistent behavior that interferes with weight gain even thought at a significantly low weight - No, disturbance in the way in which one's body weight or shape is experienced, undue influence of body weight or shape on self evaluation, or persistent lack of recognition of the seriousness of the current low body weight - No    Symptoms of Bulimia: recurrent episodes of binge eating (definitely larger amount  than what others would eat and lack of a sense of control over eating during episode) - No, recurrent inappropriate compensatory behaviors in order to prevent weight gain (fasting, medications, exercise, vomiting) - No, binges and compensatory behaviors both occur on average at least once a week for 3 months - No, self evaluations is unduly influenced by body shape/weight- - No    Symptoms of " Binge eating: recurrent episodes of binge eating (definitely larger amount than what others would eat and lack of a sense of control over eating during episode) - No, 3 or more of following (eating much more rapidly, eating until uncomfortably full, large amounts when not hungry, eating alone because of embarrassed by how much,  feeling disgusted with oneself, depressed or very guilty afterward) - No, distress regarding binges - No, binges occur on average at least once a week for 3 months - No      Substance/s:  Taken in larger amounts or over longer periods than intended: No,  Persistent desire or unsuccessful attempts to cut down or stop: No,  Great deal of time spent seeking, using or recovering from: No,  Craving or strong desire to use: No,  Recurrent use despite failure to meet major role obligation: No,  Continued use despite persistent or recurrent social/interparsonal issues due to use: No,  Important social/work/recreational activities given up due to use: No,  Recurrent use in physically hazardous situations: No,  Continued use despite knowledge of persistent physical or psychological problem: No,  Tolerance (either increased need or diminished effect): No,      [facility] Toolkit ASQ Suicide Screening Tool:  In the past few weeks, have you wished you were dead? No  In the past few weeks, have you felt that you or your family would be better off if you were dead? No  In the past week, have you been having thoughts about killing yourself? No  Have you ever tried to kill yourself? No  Are you having thoughts of killing yourself right now? No       Psychotherapy:  Target symptoms: depression, anxiety   Why chosen therapy is appropriate versus another modality: relevant to diagnosis, patient responds to this modality, evidence based practice  Outcome monitoring methods: self-report, observation  Therapeutic intervention type: insight oriented psychotherapy, behavior modifying psychotherapy, supportive  psychotherapy, interactive psychotherapy  Topics discussed/themes: difficulty managing affect in interpersonal relationships, building skills sets for symptom management, symptom recognition  The patient's response to the intervention is accepting. The patient's progress toward treatment goals is fair.   Duration of intervention: 16 minutes.      PAST PSYCHIATRIC HISTORY  Previous Psychiatric Hospitalizations: No  Previous SI/HI: No,  Previous Suicide Attempts: No,   Previous Medication Trials: Yes,for post partum depression  Psychiatric Care (current & past): Yes OBGYN in past,  History of Psychotherapy: No,  History of Violence: No,  History of sexual/physical abuse: No,    PAST MEDICAL & SURGICAL HISTORY   Past Medical History:   Diagnosis Date    History of atrial fibrillation      Past Surgical History:   Procedure Laterality Date     SECTION N/A 2021    Procedure: PRIMARY  SECTION;  Surgeon: Mehran Fish MD;  Location: [facility];  Service: OB/GYN;  Laterality: N/A;    COLONOSCOPY      ESOPHAGOSCOPY      INJECTION, SACROILIAC JOINT Left 2023    Procedure: INJECTION,SACROILIAC JOINT;  Surgeon: Dez Gonzales MD;  Location: [facility] OR;  Service: Pain Management;  Laterality: Left;    INJECTION, SACROILIAC JOINT Right 2024    Procedure: INJECTION,SACROILIAC JOINT;  Surgeon: Dez Gonzales MD;  Location: [facility] OR;  Service: Pain Management;  Laterality: Right;    TONSILLECTOMY, ADENOIDECTOMY, BILATERAL MYRINGOTOMY AND TUBES      WISDOM TOOTH EXTRACTION           CURRENT PSYCH MEDICATION REGIMEN   denied  Current Medication side effects:  n/a  Current Medication compliance:  n/a    Previous psych meds trials  Yes- pt unable to name    Home Meds:   Prior to Admission medications    Medication Sig Start Date End Date Taking? Authorizing Provider   famotidine (PEPCID) 20 MG tablet Take 20 mg by mouth 2 (two) times daily.    Provider, Historical   gabapentin (NEURONTIN) 100 MG capsule  "Take 1 capsule (100 mg total) by mouth 3 (three) times daily. 1/5/24   Dez Gonzales MD   ibuprofen (ADVIL,MOTRIN) 800 MG tablet Take 1 tablet (800 mg total) by mouth every 6 (six) hours as needed for Pain. 1/5/24   Dez Gonzales MD   metoprolol tartrate (LOPRESSOR) 100 MG tablet Take 1 tablet (100 mg total) by mouth 2 (two) times daily.  Patient not taking: Reported on 1/8/2024 3/30/22 11/22/23  Reno Forte MD         OTC Meds: none    Scheduled Meds:    PRN Meds:   Current Facility-Administered Medications:     acetaminophen, 650 mg, Oral, Q6H PRN    calcium carbonate, 1,000 mg, Oral, Q8H PRN    haloperidoL, 5 mg, Oral, Q4H PRN **AND** diphenhydrAMINE, 50 mg, Oral, Q4H PRN **AND** LORazepam, 2 mg, Oral, Q4H PRN **AND** haloperidol lactate, 5 mg, Intramuscular, Q4H PRN **AND** diphenhydrAMINE, 50 mg, Intramuscular, Q4H PRN **AND** lorazepam, 2 mg, Intramuscular, Q4H PRN    guaiFENesin 100 mg/5 ml, 200 mg, Oral, Q4H PRN    hydrOXYzine pamoate, 50 mg, Oral, Q6H PRN    melatonin, 6 mg, Oral, Nightly PRN    nicotine, 1 patch, Transdermal, Daily PRN    ondansetron, 4 mg, Oral, Q8H PRN   Psychotherapeutics (From admission, onward)      Start     Stop Route Frequency Ordered    07/29/25 0102  haloperidoL tablet 5 mg  (Med - Acute  Behavioral Management)        Placed in "And" Linked Group    -- Oral Every 4 hours PRN 07/29/25 0102    07/29/25 0102  LORazepam tablet 2 mg  (Med - Acute  Behavioral Management)        Placed in "And" Linked Group    -- Oral Every 4 hours PRN 07/29/25 0102    07/29/25 0102  haloperidol lactate injection 5 mg  (Med - Acute  Behavioral Management)        Placed in "And" Linked Group    -- IM Every 4 hours PRN 07/29/25 0102 07/29/25 0102  LORazepam injection 2 mg  (Med - Acute  Behavioral Management)        Placed in "And" Linked Group    -- IM Every 4 hours PRN 07/29/25 0102            ALLERGIES   Review of patient's allergies indicates:   Allergen Reactions    Shrimp Edema    " Allergenic extract-food-shrimp Edema    Pollen extracts      General allergic rhinitis        NEUROLOGIC HISTORY  Seizures: No  Head trauma: No    SOCIAL HISTORY:  Developmental/Childhood:Achieved all developmental milestones timely  Education:High School Diploma  Employment Status/Finances:Homemaker   Relationship Status/Sexual Orientation:   Children: 1  Housing Status: Home    history:  NO   Access to Firearms: NO ;  Locked up? n/a  Restoration:Non-spiritual  Recreational activities:Art; time with family    SUBSTANCE ABUSE HISTORY   Recreational Drugs: denied   Use of Alcohol: occasional, social use  Rehab History:no   Tobacco Use:no    LEGAL HISTORY:   Past charges/incarcerations: NO  Pending charges:NO    FAMILY PSYCHIATRIC HISTORY   Family History   Problem Relation Name Age of Onset    Breast cancer Neg Hx      Colon cancer Neg Hx      Ovarian cancer Neg Hx         denied      ROS  General ROS: negative  Ophthalmic ROS: negative  ENT ROS: negative  Allergy and Immunology ROS: negative  Hematological and Lymphatic ROS: negative  Endocrine ROS: negative  Respiratory ROS: no cough, shortness of breath, or wheezing  Cardiovascular ROS: no chest pain or dyspnea on exertion  Gastrointestinal ROS: no abdominal pain, change in bowel habits, or black or bloody stools  Genito-Urinary ROS: no dysuria, trouble voiding, or hematuria  Musculoskeletal ROS: negative  Neurological ROS: no TIA or stroke symptoms  Dermatological ROS: negative        EXAMINATION    PHYSICAL EXAM  Reviewed note/exam by Dr. Terrell from 7/28/25 at 5:20 PM; MEd consulted for physical exam- pending    VITALS   Vitals:    07/29/25 0730   BP: (!) 178/95   Pulse: 110   Resp: 18   Temp: 98.2 °F (36.8 °C)        Body mass index is 43.7 kg/m².        PAIN  0/10  Subjective report of pain matches objective signs and symptoms: Yes    LABORATORY DATA   Recent Results (from the past 72 hours)   Hemoglobin A1c    Collection Time: 07/28/25  5:09 PM    Result Value Ref Range    Hemoglobin A1c 5.8 (H) 4.0 - 5.6 %    Estimated Average Glucose 120 68 - 131 mg/dL   Comprehensive Metabolic Panel    Collection Time: 07/28/25  5:30 PM   Result Value Ref Range    Sodium 140 136 - 145 mmol/L    Potassium 3.3 (L) 3.5 - 5.1 mmol/L    Chloride 107 95 - 110 mmol/L    CO2 20 (L) 23 - 29 mmol/L    Glucose 144 (H) 70 - 110 mg/dL    BUN 10 6 - 20 mg/dL    Creatinine 0.9 0.5 - 1.4 mg/dL    Calcium 9.2 8.7 - 10.5 mg/dL    Protein Total 8.4 6.0 - 8.4 gm/dL    Albumin 4.1 3.5 - 5.2 g/dL    Bilirubin Total 0.5 0.1 - 1.0 mg/dL    ALP 80 40 - 150 unit/L    AST 16 11 - 45 unit/L    ALT 15 10 - 44 unit/L    Anion Gap 13 8 - 16 mmol/L    eGFR >60 >60 mL/min/1.73/m2   Salicylate Level    Collection Time: 07/28/25  5:30 PM   Result Value Ref Range    Salicylate Level <5.0 (L) 15.0 - 30.0 mg/dL   Acetaminophen Level    Collection Time: 07/28/25  5:30 PM   Result Value Ref Range    Acetaminophen Level <3.0 (L) 10.0 - 20.0 ug/ml   TSH    Collection Time: 07/28/25  5:30 PM   Result Value Ref Range    TSH 2.502 0.400 - 4.000 uIU/mL   Ethanol    Collection Time: 07/28/25  5:30 PM   Result Value Ref Range    Alcohol, Serum <10 <10 mg/dL   CBC with Differential    Collection Time: 07/28/25  5:30 PM   Result Value Ref Range    WBC 11.47 3.90 - 12.70 K/uL    RBC 4.88 4.00 - 5.40 M/uL    HGB 13.5 12.0 - 16.0 gm/dL    HCT 40.2 37.0 - 48.5 %    MCV 82 82 - 98 fL    MCH 27.7 27.0 - 31.0 pg    MCHC 33.6 32.0 - 36.0 g/dL    RDW 13.5 11.5 - 14.5 %    Platelet Count 310 150 - 450 K/uL    MPV 10.0 9.2 - 12.9 fL    Nucleated RBC 0 <=0 /100 WBC    Neut % 79.9 (H) 38 - 73 %    Lymph % 12.2 (L) 18 - 48 %    Mono % 5.9 4 - 15 %    Eos % 1.2 <=8 %    Basophil % 0.5 <=1.9 %    Imm Grans % 0.3 0.0 - 0.5 %    Neut # 9.16 (H) 1.8 - 7.7 K/uL    Lymph # 1.40 1 - 4.8 K/uL    Mono # 0.68 0.3 - 1 K/uL    Eos # 0.14 <=0.5 K/uL    Baso # 0.06 <=0.2 K/uL    Imm Grans # 0.03 0.00 - 0.04 K/uL   Troponin I High Sensitivity     "Collection Time: 07/28/25  5:30 PM   Result Value Ref Range    Troponin High Sensitive 3 <=14 ng/L   Urinalysis, Reflex to Urine Culture Urine, Clean Catch    Collection Time: 07/28/25  8:44 PM    Specimen: Urine   Result Value Ref Range    Color, UA Yellow Straw, Essence, Yellow, Light-Orange    Appearance, UA Cloudy (A) Clear    pH, UA 6.0 5.0 - 8.0    Spec Grav UA 1.025 1.005 - 1.030    Protein, UA 2+ (A) Negative    Glucose, UA Negative Negative    Ketones, UA Negative Negative    Bilirubin, UA Negative Negative    Blood, UA 2+ (A) Negative    Nitrites, UA Negative Negative    Urobilinogen, UA Negative <2.0 EU/dL    Leukocyte Esterase, UA 2+ (A) Negative   Drug screen panel, in-house    Collection Time: 07/28/25  8:44 PM   Result Value Ref Range    Benzodiazepine, Urine Negative Negative    Methadone, Urine Negative Negative    Cocaine, Urine Negative Negative    Opiates, Urine Negative Negative    Barbiturates, Urine Negative Negative    Amphetamines, Urine Negative Negative    THC Negative Negative    Phencyclidine, Urine Negative Negative    Urine Creatinine 188.7 15.0 - 325.0 mg/dL   Pregnancy, urine rapid    Collection Time: 07/28/25  8:44 PM   Result Value Ref Range    hCG Qualitative, Urine Negative Negative   Urinalysis Microscopic    Collection Time: 07/28/25  8:44 PM   Result Value Ref Range    RBC, UA 4 0 - 4 /HPF    WBC, UA 20 (H) 0 - 5 /HPF    Bacteria, UA Many (A) None, Rare, Occasional /HPF    Squamous Epithelial Cells, UA 25 <=5 /HPF    Hyaline Casts, UA 0 0 - 1 /LPF    Microscopic Comment     Troponin I High Sensitivity    Collection Time: 07/28/25 10:07 PM   Result Value Ref Range    Troponin High Sensitive 5 <=14 ng/L   EKG 12-lead    Collection Time: 07/29/25 12:02 AM   Result Value Ref Range    QRS Duration 84 ms    [facility] QTC Calculation 501 ms      No results found for: "PHENYTOIN", "PHENOBARB", "VALPROATE", "CBMZ"        CONSTITUTIONAL  General Appearance: unremarkable, age appropriate, " obese    MUSCULOSKELETAL  Muscle Strength and Tone:no dyskinesia, no dystonia, no tremor, no tic  Abnormal Involuntary Movements: No  Gait and Station: non-ataxic    PSYCHIATRIC   Level of Consciousness: awake and alert   Orientation: person, place, time, and situation  Grooming: Hospital garb and Well groomed  Psychomotor Behavior: normal, cooperative, friendly and cooperative, eye contact normal, no PMA/R  Speech: normal tone, normal rate, normal pitch, normal volume, spontaneous  Language: grossly intact, able to name, able to repeat  Mood: anxious  Affect: Anxious, Consistent with mood, Congruent with thought, and Full  Thought Process: linear, logical  Associations: intact   Thought Content: denies SI, denies HI, and no delusions  Perceptions: denies AH and denies  VH  Memory: Able to recall past events, Remote intact, and Recent intact  Attention:Normal and Attends to interview without distraction  Fund of Knowledge: Aware of current events and Vocabulary appropriate   Estimate if Intelligence:  Average based on work/education history, vocabulary and mental status exam  Insight: intact, has awareness of illness- fair  Judgment: behavior is adequate to circumstances, age appropriate- fair/improving      PSYCHOSOCIAL    PSYCHOSOCIAL STRESSORS   family and marital    FUNCTIONING RELATIONSHIPS   good support system and strained with spouse or significant others    STRENGTHS AND LIABILITIES   Strength: Patient accepts guidance/feedback, Strength: Patient is expressive/articulate., Liability: Patient is unstable., Liability: Patient lacks coping skills.    Is the patient aware of the biomedical complications associated with substance abuse and mental illness? yes    Does the patient have an Advance Directive for Mental Health treatment? no  (If yes, inform patient to bring copy.)        MEDICAL DECISION MAKING        ASSESSMENT     Suicidal ideations  Adjustment disorder with mixed depressed and anxious  features    Psychosocial stressors    Obesity  Tachycardia  Elevated Blood pressure  Elevated blood glucose      PROBLEM LIST AND MANAGEMENT PLANS    Suicidal ideations: pt counseled  -continue inpatient tx for safety and stabilization  -seeking collateral- pending result, patient may be a candidate for discharge tomorrow.    Adjustment disorder with mixed depressed and anxious features: pt counseled  -pt declined pharmacotherapy  -psychotherapy provided    Psychosocial stressors: pt counseled  -SW consulted for resources     Obesity: pt counseled     Tachycardia: pt counseled  -med consulted     Elevated Blood pressure: pt counseled  -med consulted     Elevated blood glucose: pt counseled  -med consulted   Check HgA1c- 5.8  +prediabetes       PRESCRIPTION DRUG MANAGEMENT  Compliance: yes  Side Effects: no  Regimen Adjustments: see above    Discussed diagnosis, risks and benefits of proposed treatment vs alternative treatments vs no treatment, potential side effects of these treatments and the inherent unpredictability of treatment. The patient expresses understanding of the above and displays the capacity to agree with this treatment given said understanding. Patient also agrees that, currently, the benefits outweigh the risks and would like to pursue/continue treatment at this time.    Any medications being used off-label were discussed with the patient inclusive of the evidence base for the use of the medications and consent was obtained for the off-label use of the medication.         DIAGNOSTIC TESTING  Labs reviewed with patient; follow up pending labs    Disposition:  -Will attempt to obtain outside psychiatric records if available  -SW to assist with aftercare planning and collateral  -Once stable discharge home with outpatient follow up care and/or rehab  -Continue inpatient treatment under a PEC and/or CEC for danger to self/ danger to others/grave disability as evident by suicidal ideation        Yoni  ISHMAEL Ivey MD  Psychiatry

## 2025-07-29 NOTE — CONSULTS
[facility] Hospital Medicine  Consult Note    Patient Name: Joseph Pepper  MRN: [id number]  Admission Date: 2025  Hospital Length of Stay: 0 days  Attending Physician: Yoni Ivey MD   Primary Care Provider: Jesusita Cadet NP           Patient information was obtained from patient and ER records.     Inpatient consult to [facility] for History and Physical  Consult performed by: Sharon Flood PA-C  Consult ordered by: Yoni Ivey MD        Subjective:     Principal Problem: Adjustment disorder with mixed anxiety and depressed mood    Chief Complaint: No chief complaint on file.       HPI: Patient is a 29 year old female who was admitted to [facility] for suicidal ideation.  Hospital medicine consulted for medical management.        Past Medical History:   Diagnosis Date    History of atrial fibrillation        Past Surgical History:   Procedure Laterality Date     SECTION N/A 2021    Procedure: PRIMARY  SECTION;  Surgeon: Mehran Fish MD;  Location: [facility] OR;  Service: OB/GYN;  Laterality: N/A;    COLONOSCOPY      ESOPHAGOSCOPY      INJECTION, SACROILIAC JOINT Left 2023    Procedure: INJECTION,SACROILIAC JOINT;  Surgeon: Dez Gonzales MD;  Location: [facility] OR;  Service: Pain Management;  Laterality: Left;    INJECTION, SACROILIAC JOINT Right 2024    Procedure: INJECTION,SACROILIAC JOINT;  Surgeon: Dez Gonzales MD;  Location: [facility] OR;  Service: Pain Management;  Laterality: Right;    TONSILLECTOMY, ADENOIDECTOMY, BILATERAL MYRINGOTOMY AND TUBES      WISDOM TOOTH EXTRACTION         Review of patient's allergies indicates:   Allergen Reactions    Shrimp Edema    Allergenic extract-food-shrimp Edema    Pollen extracts      General allergic rhinitis        Current Facility-Administered Medications on File Prior to Encounter   Medication    [COMPLETED] acetaminophen tablet 1,000 mg    [COMPLETED] cefTRIAXone injection  1 g    [COMPLETED] ibuprofen tablet 800 mg    [DISCONTINUED] diphenhydrAMINE injection 50 mg    [DISCONTINUED] haloperidol lactate injection 5 mg    [DISCONTINUED] LORazepam injection 2 mg     Current Outpatient Medications on File Prior to Encounter   Medication Sig    famotidine (PEPCID) 20 MG tablet Take 20 mg by mouth 2 (two) times daily.    gabapentin (NEURONTIN) 100 MG capsule Take 1 capsule (100 mg total) by mouth 3 (three) times daily.    ibuprofen (ADVIL,MOTRIN) 800 MG tablet Take 1 tablet (800 mg total) by mouth every 6 (six) hours as needed for Pain.    metoprolol tartrate (LOPRESSOR) 100 MG tablet Take 1 tablet (100 mg total) by mouth 2 (two) times daily. (Patient not taking: Reported on 1/8/2024)     Family History    None       Tobacco Use    Smoking status: Never    Smokeless tobacco: Never   Substance and Sexual Activity    Alcohol use: Not Currently     Comment: occ    Drug use: Never    Sexual activity: Yes     Partners: Male     Birth control/protection: None     Comment:      Review of Systems   Constitutional:  Negative for chills and fever.   HENT:  Negative for congestion and drooling.    Respiratory:  Negative for cough and shortness of breath.    Cardiovascular:  Negative for chest pain and leg swelling.   Gastrointestinal:  Negative for constipation, diarrhea, nausea and vomiting.   Genitourinary:  Negative for difficulty urinating and dysuria.   Musculoskeletal:  Negative for arthralgias and gait problem.     Objective:     Vital Signs (Most Recent):  Temp: 98.2 °F (36.8 °C) (07/29/25 0730)  Pulse: 110 (07/29/25 0730)  Resp: 18 (07/29/25 0730)  BP: (!) 178/95 (07/29/25 0730)  SpO2: 96 % (07/29/25 0730) Vital Signs (24h Range):  Temp:  [98.2 °F (36.8 °C)-99.6 °F (37.6 °C)] 98.2 °F (36.8 °C)  Pulse:  [] 110  Resp:  [16-20] 18  SpO2:  [96 %-98 %] 96 %  BP: (132-178)/() 178/95     Weight: 122.8 kg (270 lb 11.6 oz)  Body mass index is 43.7 kg/m².     Physical  Exam  Constitutional:       Appearance: Normal appearance.   HENT:      Head: Normocephalic and atraumatic.   Cardiovascular:      Rate and Rhythm: Normal rate and regular rhythm.   Pulmonary:      Effort: Pulmonary effort is normal. No respiratory distress.      Breath sounds: Normal breath sounds.   Abdominal:      General: Abdomen is flat. There is no distension.      Palpations: Abdomen is soft.   Musculoskeletal:      Right lower leg: No edema.      Left lower leg: No edema.   Skin:     General: Skin is warm and dry.   Neurological:      Mental Status: She is alert and oriented to person, place, and time. Mental status is at baseline.      Comments: CN II: Visual Fields full to confrontation  CN III, IV , VI PERRL   CN III: no palsy   Nystagmus: none  Diplopia: none  Ophthalmoparesis: none  CN V Facial sensation intact  CN VII facial expression full, symmetric  CN VIII normal  CN IX normal  CN X normal  CN XI normal  CN XII normal                 Significant Labs: UPT  Results for orders placed or performed in visit on 05/05/22   POCT Urine Pregnancy    Collection Time: 05/05/22 10:07 AM   Result Value Ref Range    POC Preg Test, Ur Negative Negative     Acceptable Yes      U/A  Positive for UTI     UDS  Results for orders placed or performed during the hospital encounter of 07/28/25   Drug screen panel, in-house    Collection Time: 07/28/25  8:44 PM   Result Value Ref Range    Benzodiazepine, Urine Negative Negative    Methadone, Urine Negative Negative    Cocaine, Urine Negative Negative    Opiates, Urine Negative Negative    Barbiturates, Urine Negative Negative    Amphetamines, Urine Negative Negative    THC Negative Negative    Phencyclidine, Urine Negative Negative    Urine Creatinine 188.7 15.0 - 325.0 mg/dL     CBC  Results for orders placed or performed during the hospital encounter of 07/28/25   CBC with Differential    Collection Time: 07/28/25  5:30 PM   Result Value Ref Range    WBC  11.47 3.90 - 12.70 K/uL    RBC 4.88 4.00 - 5.40 M/uL    HGB 13.5 12.0 - 16.0 gm/dL    HCT 40.2 37.0 - 48.5 %    MCV 82 82 - 98 fL    MCH 27.7 27.0 - 31.0 pg    MCHC 33.6 32.0 - 36.0 g/dL    RDW 13.5 11.5 - 14.5 %    Platelet Count 310 150 - 450 K/uL    MPV 10.0 9.2 - 12.9 fL    Nucleated RBC 0 <=0 /100 WBC    Neut % 79.9 (H) 38 - 73 %    Lymph % 12.2 (L) 18 - 48 %    Mono % 5.9 4 - 15 %    Eos % 1.2 <=8 %    Basophil % 0.5 <=1.9 %    Imm Grans % 0.3 0.0 - 0.5 %    Neut # 9.16 (H) 1.8 - 7.7 K/uL    Lymph # 1.40 1 - 4.8 K/uL    Mono # 0.68 0.3 - 1 K/uL    Eos # 0.14 <=0.5 K/uL    Baso # 0.06 <=0.2 K/uL    Imm Grans # 0.03 0.00 - 0.04 K/uL   Results for orders placed or performed during the hospital encounter of 03/28/22   CBC auto differential    Collection Time: 03/29/22  1:46 AM   Result Value Ref Range    WBC 10.29 3.90 - 12.70 K/uL    RBC 5.10 4.00 - 5.40 M/uL    Hemoglobin 14.1 12.0 - 16.0 g/dL    Hematocrit 43.7 37.0 - 48.5 %    MCV 86 82 - 98 fL    MCH 27.6 27.0 - 31.0 pg    MCHC 32.3 32.0 - 36.0 g/dL    RDW 13.4 11.5 - 14.5 %    Platelets 327 150 - 450 K/uL    MPV 10.4 9.2 - 12.9 fL    Immature Granulocytes 0.2 0.0 - 0.5 %    Gran # (ANC) 4.6 1.8 - 7.7 K/uL    Immature Grans (Abs) 0.02 0.00 - 0.04 K/uL    Lymph # 4.3 1.0 - 4.8 K/uL    Mono # 0.7 0.3 - 1.0 K/uL    Eos # 0.7 (H) 0.0 - 0.5 K/uL    Baso # 0.07 0.00 - 0.20 K/uL    nRBC 0 0 /100 WBC    Gran % 44.7 38.0 - 73.0 %    Lymph % 41.3 18.0 - 48.0 %    Mono % 6.7 4.0 - 15.0 %    Eosinophil % 6.4 0.0 - 8.0 %    Basophil % 0.7 0.0 - 1.9 %    Differential Method Automated      CMP  Results for orders placed or performed during the hospital encounter of 07/28/25   Comprehensive Metabolic Panel    Collection Time: 07/28/25  5:30 PM   Result Value Ref Range    Sodium 140 136 - 145 mmol/L    Potassium 3.3 (L) 3.5 - 5.1 mmol/L    Chloride 107 95 - 110 mmol/L    CO2 20 (L) 23 - 29 mmol/L    Glucose 144 (H) 70 - 110 mg/dL    BUN 10 6 - 20 mg/dL    Creatinine 0.9 0.5 -  1.4 mg/dL    Calcium 9.2 8.7 - 10.5 mg/dL    Protein Total 8.4 6.0 - 8.4 gm/dL    Albumin 4.1 3.5 - 5.2 g/dL    Bilirubin Total 0.5 0.1 - 1.0 mg/dL    ALP 80 40 - 150 unit/L    AST 16 11 - 45 unit/L    ALT 15 10 - 44 unit/L    Anion Gap 13 8 - 16 mmol/L    eGFR >60 >60 mL/min/1.73/m2     TSH  Results for orders placed or performed during the hospital encounter of 07/28/25   TSH    Collection Time: 07/28/25  5:30 PM   Result Value Ref Range    TSH 2.502 0.400 - 4.000 uIU/mL     ETOH  Results for orders placed or performed during the hospital encounter of 07/28/25   Ethanol    Collection Time: 07/28/25  5:30 PM   Result Value Ref Range    Alcohol, Serum <10 <10 mg/dL     Salicylate  Results for orders placed or performed during the hospital encounter of 07/28/25   Salicylate Level    Collection Time: 07/28/25  5:30 PM   Result Value Ref Range    Salicylate Level <5.0 (L) 15.0 - 30.0 mg/dL     Acetaminophen  Results for orders placed or performed during the hospital encounter of 07/28/25   Acetaminophen Level    Collection Time: 07/28/25  5:30 PM   Result Value Ref Range    Acetaminophen Level <3.0 (L) 10.0 - 20.0 ug/ml             Significant Imaging: none  Assessment/Plan:     * Adjustment disorder with mixed anxiety and depressed mood  Defer to psych       UTI (urinary tract infection)  2+ and elevated WBC on microscopic 20  Urine culture pending  Keflex started         VTE Risk Mitigation (From admission, onward)      None                Thank you for your consult. I will sign off. Please contact us if you have any additional questions.    Sharon Flood PA-C  Department of Hospital Medicine   [facility]

## 2025-07-29 NOTE — NURSING
Patient arrived to [facility] from [facility], via wheelchair, PEC with patient, escorted by 2 hospital security and MHT. Pt scanned and wanded, no contraband found. Ambulated to assessment room without difficulty.

## 2025-07-29 NOTE — PLAN OF CARE
[facility] attempted to contact  Bashir Pepper to discuss pt discharge but [facility] was unable to make contact. There was a voicemail but the voice mailbox was full [facility] will attempt at a later time and date to contact . [facility] did contact aunt Jodie Rae/aunt [phone] to discuss if pt may go and stay with her for awhile. She states that pt may go and stay with her for a bit and she will come to pick her up upon discharge
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
## 2025-07-29 NOTE — PSYCH
"Meditation/Education:  Morning Warm Up : Meditation Exercise  Patient Response:  "Patient attended session             "
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
## 2025-07-29 NOTE — HPI
Patient is a 29 year old female who was admitted to [facility] for suicidal ideation.  Hospital medicine consulted for medical management.

## 2025-07-29 NOTE — CONSULTS
[facility] Hospital Medicine  Consult Note    Patient Name: Joseph Pepper  MRN: [id number]  Admission Date: 7/29/2025  Hospital Length of Stay: 0 days  Attending Physician: Yoni Ivey MD   Primary Care Provider: Jesusita Cadet NP           Patient information was obtained from patient and ER records.     Consults  Subjective:     Principal Problem: Adjustment disorder with mixed anxiety and depressed mood    Chief Complaint: No chief complaint on file.       HPI: Patient is a 29 year old female who was admitted to [facility] for suicidal ideation.  Hospital medicine consulted for medical management.        No new subjective & objective note has been filed under this hospital service since the last note was generated.    Assessment/Plan:     * Adjustment disorder with mixed anxiety and depressed mood  Defer to psych       UTI (urinary tract infection)  2+ and elevated WBC on microscopic 20  Urine culture pending  Keflex started         VTE Risk Mitigation (From admission, onward)      None                Thank you for your consult. I will sign off. Please contact us if you have any additional questions.    Sharon Flood PA-C  Department of Hospital Medicine   [facility]

## 2025-07-29 NOTE — PLAN OF CARE
Plan of care reviewed. Denies intent to harm self or others at this time. Denies hallucinations and delusions. Accepts snacks and medications. Gait steady, no falls. Limited interaction noted with staff and peers. Will continue precautions and monitor for safety.
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
## 2025-07-30 VITALS
OXYGEN SATURATION: 99 % | RESPIRATION RATE: 18 BRPM | TEMPERATURE: 98 F | WEIGHT: 278 LBS | HEIGHT: 66 IN | BODY MASS INDEX: 44.68 KG/M2 | SYSTOLIC BLOOD PRESSURE: 166 MMHG | DIASTOLIC BLOOD PRESSURE: 79 MMHG | HEART RATE: 96 BPM

## 2025-07-30 PROCEDURE — 90833 PSYTX W PT W E/M 30 MIN: CPT | Mod: ,,, | Performed by: PSYCHIATRY & NEUROLOGY

## 2025-07-30 PROCEDURE — 25000003 PHARM REV CODE 250: Performed by: PHYSICIAN ASSISTANT

## 2025-07-30 PROCEDURE — 99239 HOSP IP/OBS DSCHRG MGMT >30: CPT | Mod: ,,, | Performed by: PSYCHIATRY & NEUROLOGY

## 2025-07-30 RX ORDER — CEPHALEXIN 500 MG/1
500 CAPSULE ORAL EVERY 8 HOURS
Qty: 14 CAPSULE | Refills: 0 | Status: SHIPPED | OUTPATIENT
Start: 2025-07-30 | End: 2025-08-06

## 2025-07-30 RX ADMIN — CEPHALEXIN 500 MG: 500 CAPSULE ORAL at 05:07

## 2025-07-30 NOTE — DISCHARGE INSTRUCTIONS
Patient AVS teaching and discharge instructions reviewed with patient.  Verbalized understanding. Encouraged medication compliance and follow up visit. Denies suicidal ideation. In good mood and spirits. Belongings returned with verification noted.

## 2025-07-30 NOTE — PSYCH
Pt discharge to home with follow-up at [facility]. Appointment has been scheduled on Thursday, 07/31/2025 @ 8:15 am. Pt will receive medication management, counseling for behavioral health and smoking cessation. Pt given prescriptions for psychotropic medications and Nicoderm CQ patches 14mg/24hr. AVS faxed 07/30/2025 @ 11:00 am.

## 2025-07-30 NOTE — PSYCH
"Joseph Burgos MRN:[id number] ([facility]#[id number]) (29 y.o. F) (Adm: 25)  [facility] [id number]  Patient Demographics    Patient Name  Joseph Burgos Legal Sex  Female          Age  1995 (29 y.o.) N  [id number] Address  [address]  [place] [address] Phone  [phone] (Mobile) *Preferred*     Patient Demographics    Address  [address]  [place] [address] Phone  [phone] (Mobile) *Preferred* E-mail Address  [email or web address]     "Primary Care Provider"  Jesusita Cadet NP "Phone"  [phone]     Emergency Contacts    None on File     Other Contacts    Name Relation Home Work Mobile   Martha Sanches Mother   [phone]   ismael burgos Spouse   [phone]     Documents on File     Status Date Received Description   Documents for the Patient   Notice of Privacy Pract Ackn Signed 20    Patient ID Received () 20 [place] DL exp 10/23   Clinic Authorization Signed () 20    Insurance Documents Received () 20 BCBS Spouse   Plain Language Summary English Acknowledged () 20    OHS Contracted Facility Disclosure Signed () 20    Advance Directive Acknowledgement Received () 22 psda   Plain Language Summary English Acknowledged () 20    Plain Language Summary English Acknowledged () 20    Plain Language Summary English Acknowledged () 20    Plain Language Summary English Acknowledged () 10/16/20    Plain Language Summary English Acknowledged () 10/26/20    Plain Language Summary English Acknowledged () 10/27/20    Plain Language Summary English Acknowledged () 20    Patient ID Received () 20 [place] DL 10/2023   Plain Language Summary English Acknowledged () 20    Plain Language Summary English Acknowledged () 20    Plain Language Summary English Acknowledged () 20    Plain " Language Summary English Acknowledged () 20    Plain Language Summary English Acknowledged () 21    Plain Language Summary English Acknowledged () 21    Plain Language Summary English Acknowledged () 21    Plain Language Summary English Acknowledged () 21    Plain Language Summary English Acknowledged () 21    Plain Language Summary English Acknowledged () 21    Plain Language Summary English Acknowledged () 21    Plain Language Summary English Acknowledged () 21    Insurance Documents Received () 21    Plain Language Summary English Acknowledged () 21    Insurance Documents Received () 21    Plain Language Summary English Acknowledged () 21    Clinic Authorization Signed () 21    Plain Language Summary English Acknowledged () 22    Insurance Verification Fax Received () 22 [facility] verifcation fax   Plain Language Summary English Acknowledged () 22    Clinic Authorization Signed () 10/04/22    Plain Language Summary English Acknowledged () 10/04/22    Insurance Documents Received () 10/04/22    Plain Language Summary English Acknowledged () 23    Patient ID Received 10/24/23    Clinic Authorization Signed () 10/23/23    Miscellaneous Documents clinic Received 10/24/23 MRI Eligibility Checklist   OHS Not Contracted Facility Disclosure      Plain Language Summary English Acknowledged () 23    Advance Directive Acknowledgement      Provider Based Acknowledgement      Insurance Documents Received () 23 [facility]   Plain Language Summary English Acknowledged () 23    Patient Rights and Responsibilities Acknowledged () 23    Advance Directive Acknowledgement Received 23    Plain Language Summary  English Acknowledged () 24    Plain Language Summary English Acknowledged () 24    Advance Directive Acknowledgement Received 24    Plain Language Summary English Acknowledged () 25    Patient Rights and Responsibilities Acknowledged 25    Insurance Documents      Notice of Nondiscrimination and Accessibility Unable to Obtain () 25 Refused To Sign   Notice of Nondiscrimination and Accessibility Signed 25    OHS Not Contracted Facility Disclosure      Advance Directive Acknowledgement Received 25    OHS Not Contracted Facility Disclosure Signed 25    OHS Contracted Facility Disclosure      Plain Language Summary English      Insurance Documents Not Received (Deleted)     Provider Based Acknowledgement  (Deleted)     Patient Photo   Photo of Patient   Insurance Documents  (Deleted)     Patient Entered Attachment Received (Deleted) 21 [id number]   Patient Entered Attachment Received (Deleted) 21 [id number]   Patient Entered Attachment Received (Deleted) 21 [id number]   Patient Entered Attachment Received (Deleted) 21 [id number]   Authorization to Release Protected Health Information  (Deleted) 22    HIM Release of Information Output  (Deleted) 22 Requested records   Provider Based Acknowledgement  (Deleted)     Coverage Attachment  (Deleted)     Patient Entered Attachment Received (Deleted) 22 On license of [id number]   OHS Not Contracted Facility Disclosure  (Deleted)     Patient Photo   Photo of Patient   Documents for the Encounter   Medicare Lifetime Reserve Form      Important Medicare Message Printed at Discharge      Advance Beneficiary Notice      Hospital Authorization      Hospital Authorization Received 25    Flowsheets Nursing Received 25      Admission Information    Current Information    Attending Provider Admitting Provider  Admission Type Admission Status   Yoni Ivey MD Blanchard, Michael J., MD Urgent Confirmed Admission          Admission Date/Time Discharge Date Hospital Service Auth/Cert Status   07/29/25  0101  Psychiatry Incomplete          Hospital Area Unit Room/Bed    [facility] 214/214            Discharge Disposition Discharge Destination   Home or Self Care      Admission    Complaint        Hospital Account    Name Acct ID Class Status Primary Coverage   AdamsronnyJoseph malave Nazia [id number] IP- Psych Open MEDICAID - PENDING MEDICAID          Guarantor Account (for Hospital Account #[id number])    Name Relation to Pt Service Area Active? Acct Type   Evgeny Joseph Mandujano Self [facility] Yes Behavioral Health   Address Phone     [address] [place] [address]             Coverage Information (for Hospital Account #[id number])    F/O Payor/Plan Precert #   MEDICAID/PENDING MEDICAID    Subscriber Subscriber #   Phanfrieda Joseph Mandujano [id number]   Address Phone   [address] [address]  [place] [address] [phone]

## 2025-07-30 NOTE — PLAN OF CARE
AVS paperwork and personal belongings with patient. Denies SI. Encouraged mediation compliance and follow up visit.  Escorted to front lobby for discharge.

## 2025-07-30 NOTE — PSYCH
Pt's follow-up appointment has been scheduled at [facility] on Thursday, 07/31/2025 @ 8:15 am. Follow-up note entered.

## 2025-07-30 NOTE — PSYCH
Latest Reference Range & Units 07/28/25 17:30   WBC 3.90 - 12.70 K/uL 11.47   RBC 4.00 - 5.40 M/uL 4.88   Hemoglobin 12.0 - 16.0 gm/dL 13.5   Hematocrit 37.0 - 48.5 % 40.2   MCV 82 - 98 fL 82   MCH 27.0 - 31.0 pg 27.7   MCHC 32.0 - 36.0 g/dL 33.6   RDW 11.5 - 14.5 % 13.5   Platelet Count 150 - 450 K/uL 310   MPV 9.2 - 12.9 fL 10.0   Neut % 38 - 73 % 79.9 (H)   Lymph % 18 - 48 % 12.2 (L)   Mono % 4 - 15 % 5.9   Eos % <=8 % 1.2   Basophil % <=1.9 % 0.5   Immature Granulocytes 0.0 - 0.5 % 0.3   Gran # (ANC) 1.8 - 7.7 K/uL 9.16 (H)   Lymph # 1 - 4.8 K/uL 1.40   Mono # 0.3 - 1 K/uL 0.68   Eos # <=0.5 K/uL 0.14   Baso # <=0.2 K/uL 0.06   Immature Grans (Abs) 0.00 - 0.04 K/uL 0.03   nRBC <=0 /100 WBC 0   (H): Data is abnormally high  (L): Data is abnormally low     Latest Reference Range & Units 07/28/25 17:30   Alcohol, Serum <10 mg/dL <10   Acetaminophen Level 10.0 - 20.0 ug/ml <3.0 (L)   (L): Data is abnormally low     Latest Reference Range & Units 07/28/25 20:44   Benzodiazepine, Urine Negative  Negative   Phencyclidine Negative  Negative   Cocaine, Urine Negative  Negative   Opiates, Urine Negative  Negative   Barbituates, Urine Negative  Negative   Amphetamines, Urine Negative  Negative   Marijuana (THC) Metabolite Negative  Negative   Methadone Screen, Urine Negative  Negative      Latest Reference Range & Units 07/28/25 20:44   Color, UA Straw, Essence, Yellow, Light-Orange  Yellow   Appearance, UA Clear  Cloudy !   Spec Grav UA 1.005 - 1.030  1.025   pH, UA 5.0 - 8.0  6.0   Protein, UA Negative  2+ !   Glucose, UA Negative  Negative   Ketones, UA Negative  Negative   Blood, UA Negative  2+ !   NITRITE UA Negative  Negative   Bilirubin (UA) Negative  Negative   Urobilinogen, UA <2.0 EU/dL Negative   Leukocyte Esterase, UA Negative  2+ !   RBC, UA 0 - 4 /HPF 4   WBC, UA 0 - 5 /HPF 20 (H)   Bacteria, UA None, Rare, Occasional /HPF Many !   Squam Epithel, UA <=5 /HPF 25   Hyaline Casts, UA 0 - 1 /LPF 0   Microscopic  Comment  See Comments   Urine Creatinine 15.0 - 325.0 mg/dL 188.7   !: Data is abnormal  (H): Data is abnormally high

## 2025-07-30 NOTE — PROGRESS NOTES
"   07/30/25 0900   Assessment   Patient's Identification of the Problem Patient presents cooperative, focused on discharge, reports a "tired" mood, "I'm alright." Patient states her admit is due to depression, stress, and suicide thoughts. Patient reports "I said I was going to hurt myself out of anger. I was upset with my . We have communications problems. Patient admits to negative leisure lifestyle of alcohol(rare). Patient reports she is , has one daughter(age 4), has a HSD, is a homemaker, wears glasses, lives in [place] with her family. Patient verbalized her main goal, "getting my daughter back. She calms me."   Leisure Interest Enjoy Family/Friends;Watching TV;Reading;Listen to Music;[employer]Table Games   Leisure Barriers Too Busy;Loss of Interest;Lack of Finances;Energy Level;Fears/Phobias  (fear death and scared of loosing my daughter)   Treatment Focus To Improve Mood;To Increase Motivation;To Improve Leisure Awareness/Lifestyle/Interest;To Promote Successful and Safe Self Expression;To Improve Coping Skills;To Increase Energy Level       Treatment Recommendation:   1:1 Intervention (as needed)    Cognitive Stimulation Skilled Activity  Self Expression Skilled Activity  Mild Exercises Skilled Activity  Stress Management Skilled Activity  Coping Skilled Activity  Leisure Education and Awareness Skilled Activity    Treatment Goal(s):  Long Term Goals Refer To Master Treatment Plan    Short Term Treatment Goal(s)  Patient Will:  Comply with Treatment  Exhibit Improvement in Mood  Demonstrate Constructive Expression of Feelings and Behavior  Verbalize Improvement in Mood  Identify at Least 2 Coping Skills or Leisure Skills to Reduce Depression and Hopelessness Upon Request from Therapist    Discharge Recommendations:  Encourage Patient to Actively Utilize Available Community Resources to Increase Leisure Involvement to Decrease Signs and Symptoms of Illness  Encourage Patient to Utilize Coping " Skills on a Regular Basis to Reduce the Risk of Decompensating and Re-Hospitalizations  Support Group  Follow Up with After Care Appointments  Continue with Current Leisure Activities

## 2025-07-30 NOTE — PSYCH
"Meditation/Education:  Time For Me : Meditation Exercise  Patient Response:  "Patient attended session               "

## 2025-07-30 NOTE — DISCHARGE SUMMARY
"Discharge Summary  Psychiatry    Admit Date: 7/29/2025    Discharge Date and Time: 07/30/2025 8:05 AM    Attending Physician: Yoni Ivey MD     Discharge Provider: Yoni Ivey MD    Reason for Admission:  depression/SI, "I was upset and said I wanted to hurt myself, but I didn't actually want to."     History of Present Illness:   The patient presented to the ER on 7/29/2025 . Per staff notes:  -Patient to ED per [facility] with reports of SI. Patient reports that she made the comments out of anger, but she didn't really mean it. Denies HI   -Jsoeph Pepper is a 29 y.o. female that presents with suicidal ideation  Patient voiced suicidal ideation over the phone to her  earlier today  Patient is having issues at home particularly with her autistic child on history  Patient reports no history of suicidal ideation, denies any illegal drug use  Is under lot of stress, crying on EMS arrival, significant family issues daily  -Patient reports, she got into an argument with her  tonight and said some things she did not mean. She said her  recorded the conversation and let the police listen to the recording. Patient denies being suicidal and she says I have never been suicidal. Patient also reports, she misses her child. Patient denies HI no self harming behavior displayed. Patient contracted safety with staff and unit.       The patient was medically cleared and admitted to the U.     She reports that she had an argument with her . She reports that she threatened to hurt herself "in order to get him to listen." She dneid any intention behind the threat.     She is currently denying all symptoms, but she did report having significant life stressors which silicide parental (daughter with autism) and marital stressors.     She was focused on discharge- she is worried about being away form her child for too long        Symptoms of Depression: diminished mood - No, loss of " interest/anhedonia - No;  recurrent - No, >14 days - No, diminished energy - No, change in sleep - No, change in appetite - No, diminished concentration or cognition or indecisiveness - No, PMA/R -  No, excessive guilt or hopelessness or worthlessness - No, suicidal ideations - No     Changes in Sleep: trouble with initiation- No, maintenance, - No early morning awakening with inability to return to sleep - No, hypersomnolence - No     Suicidal- active/passive ideations - No, organized plans, future intentions - No     Homicidal ideations: active/passive ideations - No, organized plans, future intentions - No     Symptoms of psychosis: hallucinations - No, delusions - No, disorganized speech - No, disorganized behavior or abnormal motor behavior - No, or negative symptoms (diminshed emotional expression, avolition, anhedonia, alogia, asociality) - No, active phase symptoms >1 month - No, continuous signs of illness > 6 months - No, since onset of illness decreased level of functioning present - No     Symptoms of janina or hypomania: elevated, expansive, or irritable mood with increased energy or activity - No; > 4 days - No,  >7 days - No; with inflated self-esteem or grandiosity - No, decreased need for sleep - No, increased rate of speech - No, FOI or racing thoughts - No, distractibility - No, increased goal directed activity or PMA - No, risky/disinhibited behavior - No     Symptoms of LINETTE: excessive anxiety/worry/fear, more days than not, about numerous issues - No, ongoing for >6 months - No, difficult to control - No, with restlessness - No, fatigue - No, poor concentration - No, irritability - No, muscle tension - No, sleep disturbance - No; causes functionally impairing distress - No     Symptoms of Panic Disorder: recurrent panic attacks (palpitations/heart racing, sweating, shakiness, dyspnea, choking, chest pain/discomfort, Gi symptoms, dizzy/lightheadedness, hot/col flashes, paresthesias, derealization,  "fear of losing control or fear of dying or fear of "going crazy") - No, precipitated - No, un-precipitated - No, source of worry and/or behavioral changes secondary for 1 month or longer- No, agoraphobia - No     Symptoms of PTSD: h/o trauma exposure - No; re-experiencing/intrusive symptoms - No, avoidant behavior - No, 2 or more negative alterations in cognition or mood - No, 2 or more hyperarousal symptoms - No; with dissociative symptoms - No, ongoing for 1 or more  months - No     Symptoms of OCD: obsessions (recurrent thoughts/urges/images; intrusive and/or unwanted; uses other thoughts/actions to suppress) - No; compulsions (repetitive behaviors used to lower distress/anxiety/obsessions) - No, time-consuming (over 1 hour per day) or cause significant distress/impairment - - No     Symptoms of Anorexia: restriction of caloric intake leading to significantly low body weight - No, intense fear of gaining weight or persistent behavior that interferes with weight gain even thought at a significantly low weight - No, disturbance in the way in which one's body weight or shape is experienced, undue influence of body weight or shape on self evaluation, or persistent lack of recognition of the seriousness of the current low body weight - No     Symptoms of Bulimia: recurrent episodes of binge eating (definitely larger amount  than what others would eat and lack of a sense of control over eating during episode) - No, recurrent inappropriate compensatory behaviors in order to prevent weight gain (fasting, medications, exercise, vomiting) - No, binges and compensatory behaviors both occur on average at least once a week for 3 months - No, self evaluations is unduly influenced by body shape/weight- - No     Symptoms of Binge eating: recurrent episodes of binge eating (definitely larger amount than what others would eat and lack of a sense of control over eating during episode) - No, 3 or more of following (eating much more " rapidly, eating until uncomfortably full, large amounts when not hungry, eating alone because of embarrassed by how much,  feeling disgusted with oneself, depressed or very guilty afterward) - No, distress regarding binges - No, binges occur on average at least once a week for 3 months - No        Substance/s:  Taken in larger amounts or over longer periods than intended: No,  Persistent desire or unsuccessful attempts to cut down or stop: No,  Great deal of time spent seeking, using or recovering from: No,  Craving or strong desire to use: No,  Recurrent use despite failure to meet major role obligation: No,  Continued use despite persistent or recurrent social/interparsonal issues due to use: No,  Important social/work/recreational activities given up due to use: No,  Recurrent use in physically hazardous situations: No,  Continued use despite knowledge of persistent physical or psychological problem: No,  Tolerance (either increased need or diminished effect): No,        [facility] Toolkit ASQ Suicide Screening Tool:  In the past few weeks, have you wished you were dead? No  In the past few weeks, have you felt that you or your family would be better off if you were dead? No  In the past week, have you been having thoughts about killing yourself? No  Have you ever tried to kill yourself? No  Are you having thoughts of killing yourself right now? No    Procedures Performed: * No surgery found *    Hospital Course:    Patient was admitted to the inpatient psychiatry unit after being medically cleared in the ED. Chart and labs were reviewed. The patient was stabilized as follows:        Adjustment disorder with mixed depressed and anxious features: pt counseled  -pt declined pharmacotherapy  -psychotherapy provided     Psychosocial stressors: pt counseled  -SW consulted for resources      Obesity: pt counseled      Tachycardia: pt counseled  -med consulted      Elevated Blood pressure: pt counseled  -med consulted       Elevated blood glucose: pt counseled  -med consulted   Check HgA1c- 5.8  +prediabetes     UTI: pt counseled   Started continue Cephaelxin 500 mg po q 8 hours for 7 days        The patient reports improved symptoms as documented. The patient is requesting discharge.The patient is hopeful, future oriented and goal directed. The patient readily discusses both short and long term goals. The patient can identify positive coping skills and social support. AIMS score was 0. During hospitalization, the patient was encouraged to go to both groups and individual counseling. Patient was monitored for any side effects. A meeting was held with multidisciplinary team prior to discharge and pt's diagnosis, current medications, and follow up were discussed. The patient has been compliant with treatment and can adequately attend to activities of daily living in an independent manner. The patient denies any side effects. The patient denies SI, HI, plan or intent for self harm or harm to others. The patient is no longer a danger to self or others nor gravely disabled disabled. Patient discharged  in stable condition with scheduled outpatient follow up.      Collateral from her Aunt verified the patient is safe; she will leave and stay with her Aunt after discharge.     Symptoms of Depression: diminished mood - No, loss of interest/anhedonia - No;  recurrent - No, >14 days - No, diminished energy - No, change in sleep - No, change in appetite - No, diminished concentration or cognition or indecisiveness - No, PMA/R -  No, excessive guilt or hopelessness or worthlessness - No, suicidal ideations - No     Changes in Sleep: trouble with initiation- No, maintenance, - No early morning awakening with inability to return to sleep - No, hypersomnolence - No     Suicidal- active/passive ideations - No, organized plans, future intentions - No     Homicidal ideations: active/passive ideations - No, organized plans, future intentions - No      Symptoms of psychosis: hallucinations - No, delusions - No, disorganized speech - No, disorganized behavior or abnormal motor behavior - No, or negative symptoms (diminshed emotional expression, avolition, anhedonia, alogia, asociality) - No, active phase symptoms >1 month - No, continuous signs of illness > 6 months - No, since onset of illness decreased level of functioning present - No     Symptoms of janina or hypomania: elevated, expansive, or irritable mood with increased energy or activity - No; > 4 days - No,  >7 days - No; with inflated self-esteem or grandiosity - No, decreased need for sleep - No, increased rate of speech - No, FOI or racing thoughts - No, distractibility - No, increased goal directed activity or PMA - No, risky/disinhibited behavior - No     Symptoms of Anxiety: excessive anxiety/worry/fear - No; panic attacks- No           Discussed diagnosis, risks and benefits of proposed treatment vs alternative treatments vs no treatment, and potential side effects of these treatments.  The patient expresses understanding of the above and displays the capacity to agree with this treatment given said understanding.  Patient also agrees that, currently, the benefits outweigh the risks and would like to pursue treatment at this time.      Discharge MSE: stated age, casually dressed, well groomed.  No psychomotor agitation or retardation.  No abnormal involuntary movements.  Gait normal.  Speech normal, conversational.  Language fluent English. Mood fine.  Affect normal range, pleasant, euthymic.  Thought process linear.  Associations intact.  Denies suicidal or homicidal ideation.  Denies auditory hallucinations, paranoid ideation, ideas of reference.  Memory intact.  Attention intact.  Fund of knowledge intact.  Insight intact.  Judgment intact.  Alert and oriented to person, place, time.      Tobacco Usage:  Is patient a smoker? No  Does patient want prescription for Tobacco Cessation? No  Does  patient want counseling for Tobacco Cessation? No    If patient would like to quit, then over the counter nicotine patch could be used. The patient could also follow up with his PCP or psychiatric provider for other alternatives.     Final Diagnoses:    Principal Problem: Adjustment disorder with mixed depressed and anxious features    Secondary Diagnoses:   Psychosocial stressors     Obesity  Tachycardia  Elevated Blood pressure  Elevated blood glucose    Labs:  Admission on 07/29/2025   Component Date Value Ref Range Status    Cholesterol Total 07/28/2025 214 (H)  120 - 199 mg/dL Final    Triglyceride 07/28/2025 141  30 - 150 mg/dL Final    HDL Cholesterol 07/28/2025 30 (L)  40 - 75 mg/dL Final    LDL Cholesterol 07/28/2025 155.8  63.0 - 159.0 mg/dL Final    HDL/Cholesterol Ratio 07/28/2025 14.0 (L)  20.0 - 50.0 % Final    Cholesterol/HDL Ratio 07/28/2025 7.1 (H)  2.0 - 5.0 Final    Non HDL Cholesterol 07/28/2025 184  mg/dL Final    Hemoglobin A1c 07/28/2025 5.8 (H)  4.0 - 5.6 % Final    Estimated Average Glucose 07/28/2025 120  68 - 131 mg/dL Final   Admission on 07/28/2025, Discharged on 07/29/2025   Component Date Value Ref Range Status    Sodium 07/28/2025 140  136 - 145 mmol/L Final    Potassium 07/28/2025 3.3 (L)  3.5 - 5.1 mmol/L Final    Chloride 07/28/2025 107  95 - 110 mmol/L Final    CO2 07/28/2025 20 (L)  23 - 29 mmol/L Final    Glucose 07/28/2025 144 (H)  70 - 110 mg/dL Final    BUN 07/28/2025 10  6 - 20 mg/dL Final    Creatinine 07/28/2025 0.9  0.5 - 1.4 mg/dL Final    Calcium 07/28/2025 9.2  8.7 - 10.5 mg/dL Final    Protein Total 07/28/2025 8.4  6.0 - 8.4 gm/dL Final    Albumin 07/28/2025 4.1  3.5 - 5.2 g/dL Final    Bilirubin Total 07/28/2025 0.5  0.1 - 1.0 mg/dL Final    ALP 07/28/2025 80  40 - 150 unit/L Final    AST 07/28/2025 16  11 - 45 unit/L Final    ALT 07/28/2025 15  10 - 44 unit/L Final    Anion Gap 07/28/2025 13  8 - 16 mmol/L Final    eGFR 07/28/2025 >60  >60 mL/min/1.73/m2 Final     Salicylate Level 07/28/2025 <5.0 (L)  15.0 - 30.0 mg/dL Final    Color, UA 07/28/2025 Yellow  Straw, Essence, Yellow, Light-Orange Final    Appearance, UA 07/28/2025 Cloudy (A)  Clear Final    pH, UA 07/28/2025 6.0  5.0 - 8.0 Final    Spec Grav UA 07/28/2025 1.025  1.005 - 1.030 Final    Protein, UA 07/28/2025 2+ (A)  Negative Final    Glucose, UA 07/28/2025 Negative  Negative Final    Ketones, UA 07/28/2025 Negative  Negative Final    Bilirubin, UA 07/28/2025 Negative  Negative Final    Blood, UA 07/28/2025 2+ (A)  Negative Final    Nitrites, UA 07/28/2025 Negative  Negative Final    Urobilinogen, UA 07/28/2025 Negative  <2.0 EU/dL Final    Leukocyte Esterase, UA 07/28/2025 2+ (A)  Negative Final    Acetaminophen Level 07/28/2025 <3.0 (L)  10.0 - 20.0 ug/ml Final    Benzodiazepine, Urine 07/28/2025 Negative  Negative Final    Methadone, Urine 07/28/2025 Negative  Negative Final    Cocaine, Urine 07/28/2025 Negative  Negative Final    Opiates, Urine 07/28/2025 Negative  Negative Final    Barbiturates, Urine 07/28/2025 Negative  Negative Final    Amphetamines, Urine 07/28/2025 Negative  Negative Final    THC 07/28/2025 Negative  Negative Final    Phencyclidine, Urine 07/28/2025 Negative  Negative Final    Urine Creatinine 07/28/2025 188.7  15.0 - 325.0 mg/dL Final    TSH 07/28/2025 2.502  0.400 - 4.000 uIU/mL Final    Alcohol, Serum 07/28/2025 <10  <10 mg/dL Final    hCG Qualitative, Urine 07/28/2025 Negative  Negative Final    WBC 07/28/2025 11.47  3.90 - 12.70 K/uL Final    RBC 07/28/2025 4.88  4.00 - 5.40 M/uL Final    HGB 07/28/2025 13.5  12.0 - 16.0 gm/dL Final    HCT 07/28/2025 40.2  37.0 - 48.5 % Final    MCV 07/28/2025 82  82 - 98 fL Final    MCH 07/28/2025 27.7  27.0 - 31.0 pg Final    MCHC 07/28/2025 33.6  32.0 - 36.0 g/dL Final    RDW 07/28/2025 13.5  11.5 - 14.5 % Final    Platelet Count 07/28/2025 310  150 - 450 K/uL Final    MPV 07/28/2025 10.0  9.2 - 12.9 fL Final    Nucleated RBC 07/28/2025 0  <=0 /100  WBC Final    Neut % 07/28/2025 79.9 (H)  38 - 73 % Final    Lymph % 07/28/2025 12.2 (L)  18 - 48 % Final    Mono % 07/28/2025 5.9  4 - 15 % Final    Eos % 07/28/2025 1.2  <=8 % Final    Basophil % 07/28/2025 0.5  <=1.9 % Final    Imm Grans % 07/28/2025 0.3  0.0 - 0.5 % Final    Neut # 07/28/2025 9.16 (H)  1.8 - 7.7 K/uL Final    Lymph # 07/28/2025 1.40  1 - 4.8 K/uL Final    Mono # 07/28/2025 0.68  0.3 - 1 K/uL Final    Eos # 07/28/2025 0.14  <=0.5 K/uL Final    Baso # 07/28/2025 0.06  <=0.2 K/uL Final    Imm Grans # 07/28/2025 0.03  0.00 - 0.04 K/uL Final    Troponin High Sensitive 07/28/2025 3  <=14 ng/L Final    RBC, UA 07/28/2025 4  0 - 4 /HPF Final    WBC, UA 07/28/2025 20 (H)  0 - 5 /HPF Final    Bacteria, UA 07/28/2025 Many (A)  None, Rare, Occasional /HPF Final    Squamous Epithelial Cells, UA 07/28/2025 25  <=5 /HPF Final    Hyaline Casts, UA 07/28/2025 0  0 - 1 /LPF Final    Microscopic Comment 07/28/2025    Final    Troponin High Sensitive 07/28/2025 5  <=14 ng/L Final    QRS Duration 07/29/2025 84  ms Final    [facility] QTC Calculation 07/29/2025 501  ms Final         Discharged Condition: stable and improved; not currently a danger to self/others or gravely disabled    Disposition: Home or Self Care    Is patient being discharged on multiple neuroleptics? No    Follow Up/Patient Instructions:     Take all medications as prescribed.  Attend all psychiatric and medical follow up appointments.   Abstain from all drugs and alcohol.  Call the crisis line at: [phone] for help in a crisis and emergent situations or call 911 and Return to ED for any acute worsening of your condition including suicidal or homicidal ideations      No discharge procedures on file.        Follow up apt: local [facility]- see SW/discharge notes      Medications:  Reconciled Home Medications:      Medication List        START taking these medications      cephALEXin 500 MG capsule  Commonly known as: KEFLEX  Take 1 capsule (500 mg total)  by mouth every 8 (eight) hours. for 7 days            STOP taking these medications      famotidine 20 MG tablet  Commonly known as: PEPCID     gabapentin 100 MG capsule  Commonly known as: NEURONTIN     ibuprofen 800 MG tablet  Commonly known as: ADVIL,MOTRIN     metoprolol tartrate 100 MG tablet  Commonly known as: LOPRESSOR                Diet: regular     Activity as tolerated    Total time spent discharging patient: 35 minutes    Yoni Ivey MD  Psychiatry

## 2025-07-30 NOTE — PROGRESS NOTES
Psychotherapy:  Target symptoms: depression, anxiety   Why chosen therapy is appropriate versus another modality: relevant to diagnosis, patient responds to this modality, evidence based practice  Outcome monitoring methods: self-report, observation  Therapeutic intervention type: insight oriented psychotherapy, behavior modifying psychotherapy, supportive psychotherapy, interactive psychotherapy  Topics discussed/themes: difficulty managing affect in interpersonal relationships, building skills sets for symptom management, symptom recognition  Safety planning and wrap up session  The patient's response to the intervention is accepting. The patient's progress toward treatment goals is fair.   Duration of intervention: 16 minutes.    Yoni Ivey MD  Psychiatry

## (undated) DEVICE — ELECTRODE RESUSCITATION 1 STEP